# Patient Record
Sex: MALE | Race: ASIAN | URBAN - METROPOLITAN AREA
[De-identification: names, ages, dates, MRNs, and addresses within clinical notes are randomized per-mention and may not be internally consistent; named-entity substitution may affect disease eponyms.]

---

## 2023-11-29 ENCOUNTER — OUTPATIENT (OUTPATIENT)
Dept: OUTPATIENT SERVICES | Facility: HOSPITAL | Age: 63
LOS: 1 days | End: 2023-11-29

## 2023-11-29 DIAGNOSIS — K02.9 DENTAL CARIES, UNSPECIFIED: ICD-10-CM

## 2023-11-29 PROCEDURE — D0170: CPT

## 2024-02-20 ENCOUNTER — OUTPATIENT (OUTPATIENT)
Dept: OUTPATIENT SERVICES | Facility: HOSPITAL | Age: 64
LOS: 1 days | End: 2024-02-20
Payer: COMMERCIAL

## 2024-02-20 DIAGNOSIS — K02.9 DENTAL CARIES, UNSPECIFIED: ICD-10-CM

## 2024-02-20 PROCEDURE — D0330: CPT

## 2024-02-20 PROCEDURE — D0140: CPT

## 2024-02-20 PROCEDURE — D0220: CPT

## 2024-02-21 DIAGNOSIS — K02.9 DENTAL CARIES, UNSPECIFIED: ICD-10-CM

## 2024-03-18 ENCOUNTER — OUTPATIENT (OUTPATIENT)
Dept: OUTPATIENT SERVICES | Facility: HOSPITAL | Age: 64
LOS: 1 days | End: 2024-03-18
Payer: COMMERCIAL

## 2024-03-18 DIAGNOSIS — Z01.20 ENCOUNTER FOR DENTAL EXAMINATION AND CLEANING WITHOUT ABNORMAL FINDINGS: ICD-10-CM

## 2024-03-18 PROCEDURE — D0230: CPT

## 2024-03-18 PROCEDURE — D0120: CPT

## 2024-03-18 PROCEDURE — D1110: CPT

## 2024-03-18 PROCEDURE — D0220: CPT

## 2024-03-21 DIAGNOSIS — Z01.20 ENCOUNTER FOR DENTAL EXAMINATION AND CLEANING WITHOUT ABNORMAL FINDINGS: ICD-10-CM

## 2024-03-27 ENCOUNTER — OUTPATIENT (OUTPATIENT)
Dept: OUTPATIENT SERVICES | Facility: HOSPITAL | Age: 64
LOS: 1 days | End: 2024-03-27
Payer: COMMERCIAL

## 2024-03-27 DIAGNOSIS — K01.1 IMPACTED TEETH: ICD-10-CM

## 2024-03-27 PROCEDURE — D7140: CPT

## 2024-03-27 PROCEDURE — D2930: CPT

## 2024-03-28 DIAGNOSIS — K01.0 EMBEDDED TEETH: ICD-10-CM

## 2024-05-06 ENCOUNTER — APPOINTMENT (OUTPATIENT)
Dept: CARDIOLOGY | Facility: CLINIC | Age: 64
End: 2024-05-06
Payer: MEDICAID

## 2024-05-06 VITALS
WEIGHT: 190 LBS | OXYGEN SATURATION: 98 % | HEART RATE: 104 BPM | HEIGHT: 68 IN | DIASTOLIC BLOOD PRESSURE: 82 MMHG | SYSTOLIC BLOOD PRESSURE: 134 MMHG | BODY MASS INDEX: 28.79 KG/M2

## 2024-05-06 DIAGNOSIS — R94.31 ABNORMAL ELECTROCARDIOGRAM [ECG] [EKG]: ICD-10-CM

## 2024-05-06 PROBLEM — Z00.00 ENCOUNTER FOR PREVENTIVE HEALTH EXAMINATION: Status: ACTIVE | Noted: 2024-05-06

## 2024-05-06 PROCEDURE — 99204 OFFICE O/P NEW MOD 45 MIN: CPT

## 2024-05-06 PROCEDURE — 93000 ELECTROCARDIOGRAM COMPLETE: CPT

## 2024-05-06 NOTE — REVIEW OF SYSTEMS
[Dyspnea on exertion] : dyspnea during exertion [Chest Discomfort] : no chest discomfort [Lower Ext Edema] : no extremity edema [Palpitations] : no palpitations [Syncope] : no syncope [Negative] : Heme/Lymph

## 2024-05-06 NOTE — HISTORY OF PRESENT ILLNESS
[FreeTextEntry1] : 63-year-old male with a PMHx of HTN, HLD, pre-DM  Pt presents for a cardiac evaluation. He was referred by his PCP for an abnormal EKG. He walks slowly 2-3 miles a day with no anginal symptoms but gets some SALAS going uphill or climbing stairs.  He took statin 2 years ago for a short time and then stopped it. PCP restarted it recently.   Trig 393 A1c 6.2 GFR 63

## 2024-05-06 NOTE — ASSESSMENT
[FreeTextEntry1] : 63 YOM HTN HLD uncontrolled  Pre-DM SALAS Abnormal EKG  Plan: Exercise stress ECHO Carotid duplex Repeat blood work in 3 months Follow up after the tests  Hilary Dao DNP, FNP-BC

## 2024-05-15 ENCOUNTER — APPOINTMENT (OUTPATIENT)
Dept: CARDIOLOGY | Facility: CLINIC | Age: 64
End: 2024-05-15
Payer: MEDICAID

## 2024-05-15 ENCOUNTER — NON-APPOINTMENT (OUTPATIENT)
Age: 64
End: 2024-05-15

## 2024-05-15 DIAGNOSIS — R94.39 ABNORMAL RESULT OF OTHER CARDIOVASCULAR FUNCTION STUDY: ICD-10-CM

## 2024-05-15 DIAGNOSIS — R06.02 SHORTNESS OF BREATH: ICD-10-CM

## 2024-05-15 PROCEDURE — 93325 DOPPLER ECHO COLOR FLOW MAPG: CPT

## 2024-05-15 PROCEDURE — 93320 DOPPLER ECHO COMPLETE: CPT

## 2024-05-15 PROCEDURE — 93351 STRESS TTE COMPLETE: CPT

## 2024-05-16 VITALS
OXYGEN SATURATION: 100 % | SYSTOLIC BLOOD PRESSURE: 148 MMHG | HEART RATE: 89 BPM | DIASTOLIC BLOOD PRESSURE: 87 MMHG | RESPIRATION RATE: 16 BRPM | WEIGHT: 179.9 LBS | HEIGHT: 68 IN

## 2024-05-16 LAB
ALBUMIN SERPL ELPH-MCNC: 4.6 G/DL
ALP BLD-CCNC: 57 U/L
ALT SERPL-CCNC: 23 U/L
ANION GAP SERPL CALC-SCNC: 15 MMOL/L
APTT BLD: 32.3 SEC
AST SERPL-CCNC: 26 U/L
BILIRUB SERPL-MCNC: 0.7 MG/DL
BUN SERPL-MCNC: 28 MG/DL
CALCIUM SERPL-MCNC: 9.4 MG/DL
CHLORIDE SERPL-SCNC: 101 MMOL/L
CHOLEST SERPL-MCNC: 136 MG/DL
CO2 SERPL-SCNC: 24 MMOL/L
CREAT SERPL-MCNC: 1.5 MG/DL
EGFR: 52 ML/MIN/1.73M2
GLUCOSE SERPL-MCNC: 119 MG/DL
HCT VFR BLD CALC: 47.9 %
HDLC SERPL-MCNC: 50 MG/DL
HGB BLD-MCNC: 15.8 G/DL
INR PPP: 0.95 RATIO
LDLC SERPL CALC-MCNC: 73 MG/DL
MCHC RBC-ENTMCNC: 28.4 PG
MCHC RBC-ENTMCNC: 33 G/DL
MCV RBC AUTO: 86.2 FL
NONHDLC SERPL-MCNC: 86 MG/DL
PLATELET # BLD AUTO: 235 K/UL
PMV BLD AUTO: 0 /100 WBCS
PMV BLD: 10.5 FL
POTASSIUM SERPL-SCNC: 4.7 MMOL/L
PROT SERPL-MCNC: 7 G/DL
PT BLD: 10.8 SEC
RBC # BLD: 5.56 M/UL
RBC # FLD: 12.9 %
SODIUM SERPL-SCNC: 140 MMOL/L
TRIGL SERPL-MCNC: 67 MG/DL
WBC # FLD AUTO: 8.04 K/UL

## 2024-05-16 NOTE — H&P CARDIOLOGY - COMMENTS
63y Male with PMH of HTN, HLD, ? CKD (Cr 1.5 on 5/16/24), who presented to his cardiologist for a routine check up and found to have an abnormal ECG, with subsequent + stress echo on 5/15/24.  Pt is now referred for [facility] with possible intervention if clinically indicated.

## 2024-05-16 NOTE — H&P CARDIOLOGY - NSICDXPASTMEDICALHX_GEN_ALL_CORE_FT
PAST MEDICAL HISTORY:  DM (diabetes mellitus)     HTN (hypertension)     Hyperlipidemia      PAST MEDICAL HISTORY:  Chronic kidney disease (CKD)     HTN (hypertension)     Hyperlipidemia

## 2024-05-16 NOTE — H&P CARDIOLOGY - HISTORY OF PRESENT ILLNESS
Patient is a 63y Male with PMH of HTN, HLD, DM                                                                       PSH of                                                                       Family history:      Patient has been having symptoms of....  Had + stress echo for ischemia 5/15/24 and presents to the cardiology department for C with possible intervention.         Pre cath note:  indication:  [ ] STEMI                [ ] NSTEMI                 [ ] Acute coronary syndrome                   [ ]Unstable Angina   [ ] high risk  [ ] intermediate risk  [ ] low risk                   [ x] Stable Angina     non-invasive testing: stress echo                         Date:   5/15/24                  result: [ ] high risk  [x ] intermediate risk  [ ] low risk    Anti- Anginal medications:                    [ ] not used                       [ ] used                   [ ] not used but strong indication not to use    Ejection Fraction                   [ ] <29            [ ] 30-39%   [ x] 40-49%     [ ]>50%    CHF                   [ ] active (within last 14 days on meds   [ ] Chronic (on meds but no exacerbation)    COPD                   [ ] mild (on chronic bronchodilators)  [ ] moderate (on chronic steroid therapy)      [ ] severe (indication for home O2 or PACO2 >50)    Other risk factors:                     [ ] Previous MI                     [ ] CVA/ stroke                    [ ] carotid stent/ CEA                    [ ] PVD/PAD- (arterial aneurysm, non-palpable pulses, tortuous vessel with inability to insert catheter, infra-renal dissection, renal or subclavian artery stenosis)                    [x ] diabetic                    [ ] previous CABG                    [ ] Renal Failure     Bleeding Risk: 1.2%        RIGHT RADIAL ARTERY EVALUATION:  AVIS TEST: [] Negative          [] Positive  BARBEAU TEST: [] Class A           [] Class B           [] Class C            [] Class D      EF: 40% per stress echo 5/15/24    EK24 Sinus tachy    Fluids:    63y Male with PMH of HTN, HLD, DM, who has been having symptoms of....  Then, pt had a stress echo on 5/15/24 which was + for ischemia 5/15/24.   Pt is now referred for [facility] with possible intervention if clinically indicated.       Pre cath note:  indication:  [ ] STEMI                [ ] NSTEMI                 [ ] Acute coronary syndrome                   [ ]Unstable Angina   [ ] high risk  [ ] intermediate risk  [ ] low risk                   [ x] Stable Angina     non-invasive testing: stress echo            Date:   5/15/24          result: [ ] high risk  [x ] intermediate risk  [ ] low risk    Anti- Anginal medications:                    [ ] not used                       [ ] used                   [ ] not used but strong indication not to use    Ejection Fraction                   [ ] <29            [ ] 30-39%   [ x] 40-49%     [ ]>50%    CHF                   [ ] active (within last 14 days on meds   [ ] Chronic (on meds but no exacerbation)    COPD                   [ ] mild (on chronic bronchodilators)  [ ] moderate (on chronic steroid therapy)      [ ] severe (indication for home O2 or PACO2 >50)    Other risk factors:                     [ ] Previous MI                     [ ] CVA/ stroke                    [ ] carotid stent/ CEA                    [ ] PVD/PAD- (arterial aneurysm, non-palpable pulses, tortuous vessel with inability to insert catheter, infra-renal dissection, renal or subclavian artery stenosis)                    [x ] diabetic                    [ ] previous CABG                    [ ] Renal Failure     Bleeding Risk: 1.2%        RIGHT RADIAL ARTERY EVALUATION:  AVIS TEST: [] Negative          [] Positive  BARBEAU TEST: [] Class A           [] Class B           [] Class C            [] Class D      EF: 40% per stress echo 5/15/24    EK24 Sinus tachy    Fluids:    63y Male with PMH of HTN, HLD, ? CKD (Cr 1.5 on 5/16/24), who presented to his cardiologist for a routine check up and found to have an abnormal ECG.  Denies any chest pain, dizziness, n/v, diaphoresis, orthopnea, PND, LE edema, palpitations, syncope.  Then, pt had a stress echo on 5/15/24 which was + for apical, inferior, inferoseptal wall ischemia, and EF 40% with exercise, 66% @ rest.  He only exercised for 3 mins and 40 secs, and the test was stopped due to SOB.    Pt is now referred for [facility] with possible intervention if clinically indicated.     Pre cath note:  indication:  [ ] STEMI                [ ] NSTEMI                 [ ] Acute coronary syndrome                   [ ]Unstable Angina   [ ] high risk  [ ] intermediate risk  [ ] low risk                   [ x] Stable Angina     non-invasive testing: stress echo            Date:   5/15/24         result: [ ] high risk  [x ] intermediate risk  [ ] low risk    Anti- Anginal medications:                    [ ] not used                       [x ] used                   [ ] not used but strong indication not to use  -on BB  -Amlodipine 2.5mg po x 1     Ejection Fraction                   [ ] <29            [ ] 30-39%   [ x] 40-49%     [ ]>50%    CHF                   [ ] active (within last 14 days on meds   [ ] Chronic (on meds but no exacerbation)    COPD                   [ ] mild (on chronic bronchodilators)  [ ] moderate (on chronic steroid therapy)      [ ] severe (indication for home O2 or PACO2 >50)    Other risk factors:                     [ ] Previous MI                     [ ] CVA/ stroke                    [ ] carotid stent/ CEA                    [ ] PVD/PAD- (arterial aneurysm, non-palpable pulses, tortuous vessel with inability to insert catheter, infra-renal dissection, renal or subclavian artery stenosis)                    [ ] diabetic                    [ ] previous CABG                    [ ] Renal Failure     Bleeding Risk: 1.2%    RIGHT RADIAL ARTERY EVALUATION:  AVIS TEST: [] Negative          [x] Positive    Fluids: 250cc NS bolus

## 2024-05-17 ENCOUNTER — TRANSCRIPTION ENCOUNTER (OUTPATIENT)
Age: 64
End: 2024-05-17

## 2024-05-17 ENCOUNTER — INPATIENT (INPATIENT)
Facility: HOSPITAL | Age: 64
LOS: 11 days | Discharge: HOME | DRG: 316 | End: 2024-05-29
Attending: THORACIC SURGERY (CARDIOTHORACIC VASCULAR SURGERY) | Admitting: THORACIC SURGERY (CARDIOTHORACIC VASCULAR SURGERY)
Payer: COMMERCIAL

## 2024-05-17 DIAGNOSIS — R94.39 ABNORMAL RESULT OF OTHER CARDIOVASCULAR FUNCTION STUDY: ICD-10-CM

## 2024-05-17 LAB
ANION GAP SERPL CALC-SCNC: 12 MMOL/L — SIGNIFICANT CHANGE UP (ref 7–14)
BUN SERPL-MCNC: 25 MG/DL — HIGH (ref 10–20)
CALCIUM SERPL-MCNC: 9.5 MG/DL — SIGNIFICANT CHANGE UP (ref 8.4–10.5)
CHLORIDE SERPL-SCNC: 104 MMOL/L — SIGNIFICANT CHANGE UP (ref 98–110)
CO2 SERPL-SCNC: 25 MMOL/L — SIGNIFICANT CHANGE UP (ref 17–32)
CREAT SERPL-MCNC: 1.4 MG/DL — SIGNIFICANT CHANGE UP (ref 0.7–1.5)
EGFR: 56 ML/MIN/1.73M2 — LOW
GLUCOSE SERPL-MCNC: 114 MG/DL — HIGH (ref 70–99)
HCT VFR BLD CALC: 47.4 % — SIGNIFICANT CHANGE UP (ref 42–52)
HGB BLD-MCNC: 15.8 G/DL — SIGNIFICANT CHANGE UP (ref 14–18)
MCHC RBC-ENTMCNC: 28.5 PG — SIGNIFICANT CHANGE UP (ref 27–31)
MCHC RBC-ENTMCNC: 33.3 G/DL — SIGNIFICANT CHANGE UP (ref 32–37)
MCV RBC AUTO: 85.6 FL — SIGNIFICANT CHANGE UP (ref 80–94)
NRBC # BLD: 0 /100 WBCS — SIGNIFICANT CHANGE UP (ref 0–0)
PLATELET # BLD AUTO: 206 K/UL — SIGNIFICANT CHANGE UP (ref 130–400)
PMV BLD: 10.3 FL — SIGNIFICANT CHANGE UP (ref 7.4–10.4)
POTASSIUM SERPL-MCNC: 4.1 MMOL/L — SIGNIFICANT CHANGE UP (ref 3.5–5)
POTASSIUM SERPL-SCNC: 4.1 MMOL/L — SIGNIFICANT CHANGE UP (ref 3.5–5)
RBC # BLD: 5.54 M/UL — SIGNIFICANT CHANGE UP (ref 4.7–6.1)
RBC # FLD: 12.7 % — SIGNIFICANT CHANGE UP (ref 11.5–14.5)
SODIUM SERPL-SCNC: 141 MMOL/L — SIGNIFICANT CHANGE UP (ref 135–146)
WBC # BLD: 6.57 K/UL — SIGNIFICANT CHANGE UP (ref 4.8–10.8)
WBC # FLD AUTO: 6.57 K/UL — SIGNIFICANT CHANGE UP (ref 4.8–10.8)

## 2024-05-17 PROCEDURE — 84443 ASSAY THYROID STIM HORMONE: CPT

## 2024-05-17 PROCEDURE — 97116 GAIT TRAINING THERAPY: CPT | Mod: GP

## 2024-05-17 PROCEDURE — 93458 L HRT ARTERY/VENTRICLE ANGIO: CPT

## 2024-05-17 PROCEDURE — 93458 L HRT ARTERY/VENTRICLE ANGIO: CPT | Mod: 26

## 2024-05-17 PROCEDURE — 99221 1ST HOSP IP/OBS SF/LOW 40: CPT

## 2024-05-17 PROCEDURE — 85610 PROTHROMBIN TIME: CPT

## 2024-05-17 PROCEDURE — 93880 EXTRACRANIAL BILAT STUDY: CPT

## 2024-05-17 PROCEDURE — 87641 MR-STAPH DNA AMP PROBE: CPT

## 2024-05-17 PROCEDURE — 94002 VENT MGMT INPAT INIT DAY: CPT

## 2024-05-17 PROCEDURE — 74176 CT ABD & PELVIS W/O CONTRAST: CPT | Mod: MC

## 2024-05-17 PROCEDURE — 84295 ASSAY OF SERUM SODIUM: CPT

## 2024-05-17 PROCEDURE — 86850 RBC ANTIBODY SCREEN: CPT

## 2024-05-17 PROCEDURE — C1889: CPT

## 2024-05-17 PROCEDURE — 84480 ASSAY TRIIODOTHYRONINE (T3): CPT

## 2024-05-17 PROCEDURE — 84436 ASSAY OF TOTAL THYROXINE: CPT

## 2024-05-17 PROCEDURE — 83605 ASSAY OF LACTIC ACID: CPT

## 2024-05-17 PROCEDURE — P9045: CPT

## 2024-05-17 PROCEDURE — 97161 PT EVAL LOW COMPLEX 20 MIN: CPT | Mod: GP

## 2024-05-17 PROCEDURE — 82330 ASSAY OF CALCIUM: CPT

## 2024-05-17 PROCEDURE — 82803 BLOOD GASES ANY COMBINATION: CPT

## 2024-05-17 PROCEDURE — 93005 ELECTROCARDIOGRAM TRACING: CPT

## 2024-05-17 PROCEDURE — 93306 TTE W/DOPPLER COMPLETE: CPT

## 2024-05-17 PROCEDURE — 71046 X-RAY EXAM CHEST 2 VIEWS: CPT

## 2024-05-17 PROCEDURE — 86900 BLOOD TYPING SEROLOGIC ABO: CPT

## 2024-05-17 PROCEDURE — 85730 THROMBOPLASTIN TIME PARTIAL: CPT

## 2024-05-17 PROCEDURE — 83880 ASSAY OF NATRIURETIC PEPTIDE: CPT

## 2024-05-17 PROCEDURE — 97110 THERAPEUTIC EXERCISES: CPT | Mod: GP

## 2024-05-17 PROCEDURE — 85014 HEMATOCRIT: CPT

## 2024-05-17 PROCEDURE — 94010 BREATHING CAPACITY TEST: CPT

## 2024-05-17 PROCEDURE — 36415 COLL VENOUS BLD VENIPUNCTURE: CPT

## 2024-05-17 PROCEDURE — C1751: CPT

## 2024-05-17 PROCEDURE — C1769: CPT

## 2024-05-17 PROCEDURE — 97164 PT RE-EVAL EST PLAN CARE: CPT | Mod: GP

## 2024-05-17 PROCEDURE — 86891 AUTOLOGOUS BLOOD OP SALVAGE: CPT

## 2024-05-17 PROCEDURE — 71045 X-RAY EXAM CHEST 1 VIEW: CPT

## 2024-05-17 PROCEDURE — 86901 BLOOD TYPING SEROLOGIC RH(D): CPT

## 2024-05-17 PROCEDURE — 81003 URINALYSIS AUTO W/O SCOPE: CPT

## 2024-05-17 PROCEDURE — 85025 COMPLETE CBC W/AUTO DIFF WBC: CPT

## 2024-05-17 PROCEDURE — 85027 COMPLETE CBC AUTOMATED: CPT

## 2024-05-17 PROCEDURE — 80053 COMPREHEN METABOLIC PANEL: CPT

## 2024-05-17 PROCEDURE — 84439 ASSAY OF FREE THYROXINE: CPT

## 2024-05-17 PROCEDURE — 80048 BASIC METABOLIC PNL TOTAL CA: CPT

## 2024-05-17 PROCEDURE — 83036 HEMOGLOBIN GLYCOSYLATED A1C: CPT

## 2024-05-17 PROCEDURE — 71250 CT THORAX DX C-: CPT | Mod: MC

## 2024-05-17 PROCEDURE — 86923 COMPATIBILITY TEST ELECTRIC: CPT

## 2024-05-17 PROCEDURE — 82962 GLUCOSE BLOOD TEST: CPT

## 2024-05-17 PROCEDURE — 97530 THERAPEUTIC ACTIVITIES: CPT | Mod: GP

## 2024-05-17 PROCEDURE — 85018 HEMOGLOBIN: CPT

## 2024-05-17 PROCEDURE — 84132 ASSAY OF SERUM POTASSIUM: CPT

## 2024-05-17 PROCEDURE — 87640 STAPH A DNA AMP PROBE: CPT

## 2024-05-17 PROCEDURE — 83735 ASSAY OF MAGNESIUM: CPT

## 2024-05-17 RX ORDER — LOSARTAN POTASSIUM 100 MG/1
1 TABLET, FILM COATED ORAL
Refills: 0 | DISCHARGE

## 2024-05-17 RX ORDER — SODIUM CHLORIDE 9 MG/ML
1000 INJECTION INTRAMUSCULAR; INTRAVENOUS; SUBCUTANEOUS
Refills: 0 | Status: DISCONTINUED | OUTPATIENT
Start: 2024-05-17 | End: 2024-05-17

## 2024-05-17 RX ORDER — METOPROLOL TARTRATE 50 MG
12.5 TABLET ORAL EVERY 12 HOURS
Refills: 0 | Status: DISCONTINUED | OUTPATIENT
Start: 2024-05-17 | End: 2024-05-19

## 2024-05-17 RX ORDER — LANOLIN ALCOHOL/MO/W.PET/CERES
5 CREAM (GRAM) TOPICAL AT BEDTIME
Refills: 0 | Status: DISCONTINUED | OUTPATIENT
Start: 2024-05-17 | End: 2024-05-21

## 2024-05-17 RX ORDER — ATORVASTATIN CALCIUM 80 MG/1
1 TABLET, FILM COATED ORAL
Refills: 0 | DISCHARGE

## 2024-05-17 RX ORDER — HEPARIN SODIUM 5000 [USP'U]/ML
3000 INJECTION INTRAVENOUS; SUBCUTANEOUS EVERY 12 HOURS
Refills: 0 | Status: DISCONTINUED | OUTPATIENT
Start: 2024-05-17 | End: 2024-05-19

## 2024-05-17 RX ORDER — CHLORHEXIDINE GLUCONATE 213 G/1000ML
1 SOLUTION TOPICAL ONCE
Refills: 0 | Status: DISCONTINUED | OUTPATIENT
Start: 2024-05-17 | End: 2024-05-19

## 2024-05-17 RX ORDER — ACETAMINOPHEN 500 MG
650 TABLET ORAL EVERY 6 HOURS
Refills: 0 | Status: DISCONTINUED | OUTPATIENT
Start: 2024-05-17 | End: 2024-05-21

## 2024-05-17 RX ORDER — PANTOPRAZOLE SODIUM 20 MG/1
40 TABLET, DELAYED RELEASE ORAL
Refills: 0 | Status: DISCONTINUED | OUTPATIENT
Start: 2024-05-17 | End: 2024-05-21

## 2024-05-17 RX ORDER — METOPROLOL TARTRATE 50 MG
1 TABLET ORAL
Refills: 0 | DISCHARGE

## 2024-05-17 RX ORDER — ASPIRIN/CALCIUM CARB/MAGNESIUM 324 MG
81 TABLET ORAL DAILY
Refills: 0 | Status: DISCONTINUED | OUTPATIENT
Start: 2024-05-17 | End: 2024-05-21

## 2024-05-17 RX ORDER — SODIUM CHLORIDE 9 MG/ML
3 INJECTION INTRAMUSCULAR; INTRAVENOUS; SUBCUTANEOUS EVERY 8 HOURS
Refills: 0 | Status: DISCONTINUED | OUTPATIENT
Start: 2024-05-17 | End: 2024-05-21

## 2024-05-17 RX ORDER — AMLODIPINE BESYLATE 2.5 MG/1
2.5 TABLET ORAL ONCE
Refills: 0 | Status: COMPLETED | OUTPATIENT
Start: 2024-05-17 | End: 2024-05-17

## 2024-05-17 RX ORDER — ASPIRIN/CALCIUM CARB/MAGNESIUM 324 MG
81 TABLET ORAL ONCE
Refills: 0 | Status: COMPLETED | OUTPATIENT
Start: 2024-05-17 | End: 2024-05-17

## 2024-05-17 RX ORDER — SODIUM CHLORIDE 9 MG/ML
250 INJECTION INTRAMUSCULAR; INTRAVENOUS; SUBCUTANEOUS ONCE
Refills: 0 | Status: COMPLETED | OUTPATIENT
Start: 2024-05-17 | End: 2024-05-17

## 2024-05-17 RX ORDER — ATORVASTATIN CALCIUM 80 MG/1
40 TABLET, FILM COATED ORAL AT BEDTIME
Refills: 0 | Status: DISCONTINUED | OUTPATIENT
Start: 2024-05-17 | End: 2024-05-21

## 2024-05-17 RX ORDER — SODIUM CHLORIDE 9 MG/ML
1000 INJECTION INTRAMUSCULAR; INTRAVENOUS; SUBCUTANEOUS
Refills: 0 | Status: DISCONTINUED | OUTPATIENT
Start: 2024-05-17 | End: 2024-05-19

## 2024-05-17 RX ADMIN — Medication 12.5 MILLIGRAM(S): at 19:02

## 2024-05-17 RX ADMIN — ATORVASTATIN CALCIUM 40 MILLIGRAM(S): 80 TABLET, FILM COATED ORAL at 21:10

## 2024-05-17 RX ADMIN — HEPARIN SODIUM 3000 UNIT(S): 5000 INJECTION INTRAVENOUS; SUBCUTANEOUS at 19:02

## 2024-05-17 RX ADMIN — AMLODIPINE BESYLATE 2.5 MILLIGRAM(S): 2.5 TABLET ORAL at 11:00

## 2024-05-17 RX ADMIN — Medication 81 MILLIGRAM(S): at 11:00

## 2024-05-17 RX ADMIN — SODIUM CHLORIDE 3 MILLILITER(S): 9 INJECTION INTRAMUSCULAR; INTRAVENOUS; SUBCUTANEOUS at 21:36

## 2024-05-17 RX ADMIN — SODIUM CHLORIDE 500 MILLILITER(S): 9 INJECTION INTRAMUSCULAR; INTRAVENOUS; SUBCUTANEOUS at 11:00

## 2024-05-17 RX ADMIN — SODIUM CHLORIDE 100 MILLILITER(S): 9 INJECTION INTRAMUSCULAR; INTRAVENOUS; SUBCUTANEOUS at 11:19

## 2024-05-17 NOTE — CONSULT NOTE ADULT - NS ATTEND AMEND GEN_ALL_CORE FT
The patient is a 63-year-old gentleman we were called to evaluate following his cardiac catheterization that showed left main and severe triple-vessel coronary artery disease.    The patient himself is a very poor historian and an extremely nervous patient and he recorded our entire conversation that I had with him for the first time that I met him.  Also on the phone was the patient's sister who apparently is a physician in [facility].    In summary the patient is an [place] immigrant to the [place] and has been living here for more than 25 years.    Recently has been complaining of increasing symptoms of shortness of breath with dyspnea on exertion which has been progressively getting worse and he had to stop walking because of his shortness of breath which was new for him.  He did agree to some chest discomfort but could not characterize it well.    He clearly has significant unstable angina.    He has a known history of hypertension but he tells me that he stopped taking his medications against the advice of his doctors a few years ago.    There is an extremely strong family history of premature coronary artery disease with premature death and cardiac disease in the family.    He also has a known history of chronic kidney disease but says that he has not been on regular follow-up with his physicians and his baseline creatinine was 1.5.    I had a chance to review his cardiac catheterization which is described above and it does show significant left main and 100% LAD which fills via collaterals.  He also has 8090% multisegmental disease on the right coronary system.    His ejection fraction on the stress echocardiogram few days ago was 40% with a cardiomyopathy.    I offered the patient and his family high risk open heart surgery with coronary artery bypass grafting and they want to think about their options before proceeding.    I had to spend a significant amount of time with the patient and his family and he is in multiple questions and these were all answered.

## 2024-05-17 NOTE — DISCHARGE NOTE PROVIDER - HOSPITAL COURSE
63y Male with PMH of HTN, HLD, ? CKD (Cr 1.5 on 5/16/24),  presented to his primary care MD for routine check up and found to have an abnormal ECG. He was referred to Dr. Carrillo for cardiac work.  Stress ECHO revealed apical, inferior, inferoseptal wall ischemia, and EF 40% with exercise, 66% @ rest.  He only exercised for 3 mins and 40 secs, and the test was stopped due to SOB.  Subsequent cardiac catheterization this admission revealed advanced multi-vessel CAD, He denies any chest pain, dizziness, n/v, diaphoresis, orthopnea, PND, LE edema, palpitations, syncope.  He was admitted post catheterization for medical optimization prior to CABG. On 5/20/24, he underwent    63y Male with PMH of HTN, HLD, ? CKD (Cr 1.5 on 5/16/24),  presented to his primary care MD for routine check up and found to have an abnormal ECG. He was referred to Dr. Carrillo for cardiac work.  Stress ECHO revealed apical, inferior, inferoseptal wall ischemia, and EF 40% with exercise, 66% @ rest.  He only exercised for 3 mins and 40 secs, and the test was stopped due to SOB.  Subsequent cardiac catheterization this admission revealed advanced multi-vessel CAD, He denies any chest pain, dizziness, n/v, diaphoresis, orthopnea, PND, LE edema, palpitations, syncope.  He was admitted post catheterization for medical optimization prior to CABG. On 5/21/24, he underwent surgical myocardial revascularization. Post-operatively,     A discussion was conducted with the patient and family regarding the importance and benefits of participating in a cardiac rehabilitation program. Program information given to patient with instructions to inquire further upon seeing cardiologist   63y Male with PMH of HTN, HLD, ? CKD (Cr 1.5 on 5/16/24),  presented to his primary care MD for routine check up and found to have an abnormal ECG. He was referred to Dr. Carrillo for cardiac work.  Stress ECHO revealed apical, inferior, inferoseptal wall ischemia, and EF 40% with exercise, 66% @ rest.  He only exercised for 3 mins and 40 secs, and the test was stopped due to SOB.  Subsequent cardiac catheterization this admission revealed advanced multi-vessel CAD, He denies any chest pain, dizziness, n/v, diaphoresis, orthopnea, PND, LE edema, palpitations, syncope.  He was admitted post catheterization for medical optimization prior to CABG. On 5/21/24, he underwent surgical myocardial revascularization. Post-operatively, patient had an uneventful hospital course. He was discharged home in stable condition on POD#8 with instructions to follow up with Dr. Kumar on 6/3/2024 @....    A discussion was conducted with the patient and family regarding the importance and benefits of participating in a cardiac rehabilitation program. Program information given to patient with instructions to inquire further upon seeing cardiologist.    63y Male with PMH of HTN, HLD, ? CKD (Cr 1.5 on 5/16/24),  presented to his primary care MD for routine check up and found to have an abnormal ECG. He was referred to Dr. Carrillo for cardiac work.  Stress ECHO revealed apical, inferior, inferoseptal wall ischemia, and EF 40% with exercise, 66% @ rest.  He only exercised for 3 mins and 40 secs, and the test was stopped due to SOB.  Subsequent cardiac catheterization this admission revealed advanced multi-vessel CAD, He denies any chest pain, dizziness, n/v, diaphoresis, orthopnea, PND, LE edema, palpitations, syncope.  He was admitted post catheterization for medical optimization prior to CABG. On 5/21/24, he underwent surgical myocardial revascularization. Post-operatively, patient had an uneventful hospital course. He was discharged home in stable condition on POD#8 with instructions to follow up with Dr. Kumar on 6/3/2024 @ 3:45pm    A discussion was conducted with the patient and family regarding the importance and benefits of participating in a cardiac rehabilitation program. Program information given to patient with instructions to inquire further upon seeing cardiologist.

## 2024-05-17 NOTE — DISCHARGE NOTE PROVIDER - NSDCMRMEDTOKEN_GEN_ALL_CORE_FT
Aspir 81 oral delayed release tablet: 1 tab(s) orally once a day  Lipitor 20 mg oral tablet: 1 tab(s) orally once a day  losartan 50 mg oral tablet: 1 tab(s) orally once a day  Metoprolol Tartrate 25 mg oral tablet: 1 tab(s) orally 2 times a day   acetaminophen 325 mg oral tablet: 2 tab(s) orally every 6 hours As needed Mild Pain (1 - 3), Moderate Pain (4 - 6)  aspirin 81 mg oral delayed release tablet: 1 tab(s) orally once a day  clopidogrel 75 mg oral tablet: 1 tab(s) orally once a day  furosemide 20 mg oral tablet: 1 tab(s) orally once a day  Lipitor 20 mg oral tablet: 1 tab(s) orally once a day  metoprolol tartrate 25 mg oral tablet: 0.5 tab(s) orally every 8 hours  potassium chloride 20 mEq oral tablet, extended release: 1 tab(s) orally once a day

## 2024-05-17 NOTE — DISCHARGE NOTE PROVIDER - NSDCHHNEEDSERVICE_GEN_ALL_CORE
Medication teaching and assessment/Observation and assessment Medication teaching and assessment/Observation and assessment/Teaching and training

## 2024-05-17 NOTE — DISCHARGE NOTE PROVIDER - PROVIDER TOKENS
PROVIDER:[TOKEN:[[id number]]],PROVIDER:[TOKEN:[[id number]]],PROVIDER:[TOKEN:[[id number]]] PROVIDER:[TOKEN:[[id number]],SCHEDULEDAPPT:[06/03/2024],SCHEDULEDAPPTTIME:[03:45 PM]],PROVIDER:[TOKEN:[[id number]]],PROVIDER:[TOKEN:[[id number]]]

## 2024-05-17 NOTE — CHART NOTE - NSCHARTNOTEFT_GEN_A_CORE
PRE-OP DIAGNOSIS: abnormal stress echo     PROCEDURE: [facility] with coronary angiography    Physician: Linus      ANESTHESIA TYPE:  [  ]General Anesthesia  [  ] Sedation  [ x ] Local/Regional    ESTIMATED BLOOD LOSS:    10   mL    CONDITION  [  ] Critical  [  ] Serious  [  ]Fair  [ x ]Good      SPECIMENS REMOVED (IF APPLICABLE): N/A      IV CONTRAST:      60       mL      IMPLANTS (IF APPLICABLE)      FINDINGS    Left Heart Catheterization:    LVEDP: normal   [ ] Normal Coronary Arteries  [ ] Luminal Irregularities  [ ] Non-obstructive CAD      LEFT HEART CATHETERIZATION                                    Left main mild distal disease     LAD: ostial 99/100  , distally supplied by collaterals from Diag and RCA (BRENDAN 0-1 flow)                        Diag: large, ostial 95% , distal minor irregulrities    Left Circumflex: mild to moderate disease   OM: small     Right Coronary Artery: mid 90% lesion , distal 80% lesion   RPDA mild disease   RPL 85 % lesion         DOMINANCE: Right    ACCESS: right radial   CLOSURE: D stat     INTERVENTION  IMPLANTS: none        POST-OP DIAGNOSIS  prox LAD 99/100    ostial large diag 95%  Signficant mid distal RCA lesion, significant RPL lesion         PLAN OF CARE  [ ] D/C Home today  [ ]  D/C in AM  [ ] Return to In-patient bed  [ ] Admit for observation  [ ] Return for staged procedure:  [ x] CT Surgery evaluation       Results of procedure/ plan of care discussed with patient/  in detail.

## 2024-05-17 NOTE — CONSULT NOTE ADULT - SUBJECTIVE AND OBJECTIVE BOX
Surgeon: Dr. Kumar/ Tamika / Jennifer    Consult requesting by: Linus, Primary cardiologist - Gerardo  Primary MD Khalil    HISTORY OF PRESENT ILLNESS:  63y Male with PMH of HTN, HLD, ? CKD (Cr 1.5 on 5/16/24),  presented to his primary care MD for routine check up and found to have an abnormal ECG. He was referred to Dr. Carrillo for cardiac work.  Stress ECHO revealed apical, inferior, inferoseptal wall ischemia, and EF 40% with exercise, 66% @ rest.  He only exercised for 3 mins and 40 secs, and the test was stopped due to SOB.  Subsequent cardiac catheterization today revealed advanced multi-vessel CAD, He denies any chest pain, dizziness, n/v, diaphoresis, orthopnea, PND, LE edema, palpitations, syncope.  CTS called for myocardial revascularization recommendation.    Home Medications:  Aspir 81 oral delayed release tablet: 1 tab(s) orally once a day (17 May 2024 14:10)  Lipitor 20 mg oral tablet: 1 tab(s) orally once a day (17 May 2024 14:10)  losartan 50 mg oral tablet: 1 tab(s) orally once a day (17 May 2024 14:10)  Metoprolol Tartrate 25 mg oral tablet: 1 tab(s) orally 2 times a day (17 May 2024 14:10)    NYHA functional class    [ ] Class I (no limitation) [ ] Class II (slight limitation) [ ] Class III (marked limitation) [ ] Class IV (symptoms at rest)    [place] [employer] grading of angina pectoris  [  ]  Class I	Angina only during strenuous or prolonged physical activity  [ X ]  Class II	Slight limitation, with angina only during vigorous physical activity  [ ]  Class III	Symptoms with everyday living activities, ie, moderate limitation  [ ]  Class IV	Inability to perform any activity without angina or angina at rest, ie, severe limitation      PAST MEDICAL & SURGICAL HISTORY:  HTN (hypertension)  Hyperlipidemia  Chronic kidney disease (CKD)      No significant past surgical history      MEDICATIONS  (STANDING):  aspirin enteric coated 81 milliGRAM(s) Oral daily  atorvastatin 40 milliGRAM(s) Oral at bedtime  chlorhexidine 4% Liquid 1 Application(s) Topical once  heparin   Injectable 3000 Unit(s) SubCutaneous every 12 hours  melatonin 5 milliGRAM(s) Oral at bedtime  metoprolol tartrate 12.5 milliGRAM(s) Oral every 12 hours  pantoprazole    Tablet 40 milliGRAM(s) Oral before breakfast  sodium chloride 0.9% lock flush 3 milliLiter(s) IV Push every 8 hours  sodium chloride 0.9%. 1000 milliLiter(s) (75 mL/Hr) IV Continuous <Continuous>    MEDICATIONS  (PRN):  acetaminophen     Tablet .. 650 milliGRAM(s) Oral every 6 hours PRN Moderate Pain (4 - 6)    Antiplatelet therapy:     none                      Last dose/amt:    Allergies    No Known Allergies    Intolerances    SOCIAL HISTORY:  Smoker:  [ ] No        PACK YEARS:                         WHEN QUIT?  ETOH use: ] No                Ilicit Drug use:   [ ] No  Occupation:   Lives with: sister  Assisted device use: none    FAMILY HISTORY:  No pertinent family history in first degree relatives        Review of Systems  CONSTITUTIONAL: no fever, chills or night sweats]   NEURO:  denies seizures, paralysis or paresthesias                                                                                EYES: wears glasses, no double vision, no blurry vision                                                                          ENMT: no difficulty hearing, vertigo, dysphagia, epistaxis recent dental work [ ]                                      CV:  denies chest pain, SALAS or palpitations at current time                                                                                            RESPIRATORY:  no cough or hemoptysis                                                                 GI:  no nausea, vomiting, constipation or diarrhea   : denies dysuria, hematuria, incontinence or retention                                                                                           MUSKULOSKELETAL:  denies joint swelling or muscle weakness  PSYCH:  denies dementia, depression, anxiety   ENDOCRINE:  cold intolerance[ ] heat intolerance[ ] polydipsia[ ]                                                                                                                                                                                                PHYSICAL EXAM  Vital Signs Last 24 Hrs  T(C): 36.5 (17 May 2024 12:50), Max: 36.7 (17 May 2024 10:34)  T(F): 97.7 (17 May 2024 12:50), Max: 98.1 (17 May 2024 10:34)  HR: 82 (17 May 2024 13:10) (82 - 89)  BP: 134/67 (17 May 2024 13:10) (134/67 - 148/87)  BP(mean): 107 (16 May 2024 19:20) (107 - 107)  RR: 16 (17 May 2024 13:10) (14 - 16)  SpO2: 97% (17 May 2024 13:10) (97% - 100%)    Parameters below as of 17 May 2024 10:34  Patient On (Oxygen Delivery Method): room air      Right arm bp: Left arm bp;    CONSTITUTIONAL:    well nourished, well developed, overweight in NAD                                                                       Neuro: oriented to person/place & time with no focal motor or sensory  deficits     Eyes: PERRLA, EOMI, no nystagmus, sclera anicteric  ENT:  nasal/oral mucosa with absence of cyanosis, poor dentition  Neck: no jugular vein distention, trachea midline, no goiter   Chest: bilateral breath sounds with good air movement absence of wheezes, rales, or rhonchi                                                                           CV:  RSR, S1S2, no significant murmur appreciated  Carotids: No Bruits  GI:  soft, non-tender non-distended, + bowel sounds                                                                                                          Extremities:  no evidence of cyanosis or deformity, no pedal edema   Extremity Pulses: right / left:  femorals 2+/ 2+; DP's 2+/2+; radials pressure dressing/2+    negative Allens  SKIN : no rashes                                                          LABS:                        15.8   6.57  )-----------( 206      ( 17 May 2024 11:50 )             47.4     05-17    141  |  104  |  25<H>  ----------------------------<  114<H>  4.1   |  25  |  1.4    Ca    9.5      17 May 2024 11:50    STS Score:   Procedure Type: Isolated CABG  PERIOPERATIVE OUTCOME	ESTIMATE %  Operative Mortality	0.568%  Morbidity & Mortality	4.78%  Stroke	0.96%  Renal Failure	0.824%  Reoperation	2.87%  Prolonged Ventilation	2.13%  Deep Sternal Wound Infection	0.129%  Long Hospital Stay (>14 days)	2%  Short Hospital Stay (<6 days)*	61.8%  *higher values reflect a better outcome  Clinical Summary  Planned Surgery:	Isolated CABG, Elective, First cardiovascular surgery  Demographics:	63 year old, , male, 81.2kg, 172cm, BMI: 27.4 kg/m²  Lab Values:	Creatinine: 1.5 mg/dL, Hematocrit: 47%, WBC Count: 8 10³/µL, Platelet Count: 445674 cells/µL  PreOp Medications:	ACE Inhibitors/ARBs = 48 hrs  Substance Abuse:	Never smoker  Risk Factors / Comorbidities:	Diabetes Mellitus , Hypertension  Cardiac Status:	Ejection Fraction = 50%  Coronary Artery Disease:	3 vessels diseased, Left Main Stenosis = 50%, Proximal LAD Stenosis = 70%, Stable Angina    Dr. Kumar discussed STS score and results with patient.        Assessment/ Plan:  63y Male with PMH of HTN, HLD, ? CKD (Cr 1.5 on 5/16/24),  presented to his primary care MD for routine check up and found to have an abnormal ECG. He was referred to Dr. Carrillo for cardiac work.  Stress ECHO revealed apical, inferior, inferoseptal wall ischemia, and EF 40% with exercise, 66% @ rest.  He only exercised for 3 mins and 40 secs, and the test was stopped due to SOB.  Subsequent cardiac catheterization  revealed advanced multi-vessel CAD,     Patient needs myocardial revascularization via CABG for CAD  HTN - continue BB  DLD - continue statin  Pre-op for CABG - all testing ordered  Admit CTS for medical optimization prior to CABG

## 2024-05-17 NOTE — DISCHARGE NOTE PROVIDER - NSDCCPTREATMENT_GEN_ALL_CORE_FT
PRINCIPAL PROCEDURE  Procedure: CABG, with CATHLEEN  Findings and Treatment: Activities/Restrictions  1.	Do not – drive, lift, pull or push anything over 10 pounds for 8 weeks.  2.	Shower every night and carefully wash wound, pat dry.  Cover is wound is draining with dry sterile dressing. No baths or swimming for two months.   3.	Apply support stockings /ace wraps to legs as soon as you get out of bed in the morning, remove in evening.   4.	Do progressive walking exercises every day, gradually increasing to 30 to 40 minutes/day, five days a week and incentive spirometer 10 times every hour while awake  5.	DO NOT DRIVE OR CONSUME ALCOHOL WHILE TAKING PAIN MEDICATION.  Contact your Physician promptly if:  1.	 Signs of wound infection, such as increasing redness, swelling, pain or drainage from incision.  2.	Progressive shortness of breath or increasing difficulty with breathing when lying down.  3.	Excessive nausea, vomiting, diarrhea or coughing.  4.	Increase swelling of legs that does not resolve with leg elevation.  5.	Chest pain that spreads to arms, jaw or back or sudden development of numbness, weakness, difficulty speaking or facial droop – Call 911.  6.	Diabetics who are unable to keep finger stick glucose under 150 for three consecutive readings.  Instructions:  1.	 Keep a daily log for Temperature, pulse, blood pressure, and weight twice a day and Glucose if diabetic with each meal. Call office for Temp > 101, pulse greater than 110 or less than 55, BP first # greater than 160 or less than 100, 3 pound weight gain in 1 day or 5 pounds in 3 days  2.	Hold pillow to chest and grab elbows when you need to cough.

## 2024-05-17 NOTE — ASU PATIENT PROFILE, ADULT - FALL HARM RISK - UNIVERSAL INTERVENTIONS
Bed in lowest position, wheels locked, appropriate side rails in place/Call bell, personal items and telephone in reach/Instruct patient to call for assistance before getting out of bed or chair/Non-slip footwear when patient is out of bed/[place] to call system/Physically safe environment - no spills, clutter or unnecessary equipment/Purposeful Proactive Rounding/Room/bathroom lighting operational, light cord in reach

## 2024-05-17 NOTE — DISCHARGE NOTE PROVIDER - CARE PROVIDERS DIRECT ADDRESSES
,[email or web address]

## 2024-05-17 NOTE — DISCHARGE NOTE PROVIDER - NSDCFUSCHEDAPPT_GEN_ALL_CORE_FT
[facility]  CARDIOLOGY [address]  Scheduled Appointment: 05/20/2024     Steven Kumar  [facility]  CTSURG [address] [facility]  Scheduled Appointment: 06/03/2024

## 2024-05-17 NOTE — DISCHARGE NOTE PROVIDER - CARE PROVIDER_API CALL
Steven Kumar  Thoracic and Cardiac Surgery  [address]  [place] [address]  Phone: [phone]  Fax: [phone]  Follow Up Time:     Yordy Carrillo  Cardiovascular Disease  [address]  [place] [address]  Phone: [phone]  Fax: [phone]  Follow Up Time:     Patricia Khalil  Internal Medicine  [address] [place] [address]  Phone: [phone]  Fax: [phone]  Follow Up Time:    Steven Kumar  Thoracic and Cardiac Surgery  [address], [address]  [place] [address]  Phone: [phone]  Fax: [phone]  Scheduled Appointment: 06/03/2024 03:45 PM    Yordy Carrillo  Cardiovascular Disease  [address], [address]  [place] [address]  Phone: [phone]  Fax: [phone]  Follow Up Time:     Patricia Khalil  Internal Medicine  [address] [place] [address]  Phone: [phone]  Fax: [phone]  Follow Up Time:

## 2024-05-17 NOTE — ASU PREOP CHECKLIST - NOTHING BY MOUTH SINCE
Date of Surgery Update:  Ivet Cruz was seen, history and physical examination reviewed, and patient examined by me today. There have been no significant clinical changes since the completion of the previous history and physical.    The risk, benefits, and alternatives of the proposed procedure have been explained to the patient (or appropriate guardian) and understanding verbalized. All questions answered. Patient wishes to proceed.     Electronically signed by: HEATHER Rosenberg,1/26/7099,7:91 AM 17-May-2024 21:00

## 2024-05-18 LAB
A1C WITH ESTIMATED AVERAGE GLUCOSE RESULT: 6 % — HIGH (ref 4–5.6)
ALBUMIN SERPL ELPH-MCNC: 4.2 G/DL — SIGNIFICANT CHANGE UP (ref 3.5–5.2)
ALP SERPL-CCNC: 56 U/L — SIGNIFICANT CHANGE UP (ref 30–115)
ALT FLD-CCNC: 19 U/L — SIGNIFICANT CHANGE UP (ref 0–41)
ANION GAP SERPL CALC-SCNC: 16 MMOL/L — HIGH (ref 7–14)
APTT BLD: 32.8 SEC — SIGNIFICANT CHANGE UP (ref 27–39.2)
AST SERPL-CCNC: 19 U/L — SIGNIFICANT CHANGE UP (ref 0–41)
BASOPHILS # BLD AUTO: 0.02 K/UL — SIGNIFICANT CHANGE UP (ref 0–0.2)
BASOPHILS NFR BLD AUTO: 0.3 % — SIGNIFICANT CHANGE UP (ref 0–1)
BILIRUB SERPL-MCNC: 0.9 MG/DL — SIGNIFICANT CHANGE UP (ref 0.2–1.2)
BUN SERPL-MCNC: 17 MG/DL — SIGNIFICANT CHANGE UP (ref 10–20)
CALCIUM SERPL-MCNC: 8.9 MG/DL — SIGNIFICANT CHANGE UP (ref 8.4–10.4)
CHLORIDE SERPL-SCNC: 105 MMOL/L — SIGNIFICANT CHANGE UP (ref 98–110)
CO2 SERPL-SCNC: 22 MMOL/L — SIGNIFICANT CHANGE UP (ref 17–32)
CREAT SERPL-MCNC: 1.2 MG/DL — SIGNIFICANT CHANGE UP (ref 0.7–1.5)
EGFR: 68 ML/MIN/1.73M2 — SIGNIFICANT CHANGE UP
EOSINOPHIL # BLD AUTO: 0.02 K/UL — SIGNIFICANT CHANGE UP (ref 0–0.7)
EOSINOPHIL NFR BLD AUTO: 0.3 % — SIGNIFICANT CHANGE UP (ref 0–8)
ESTIMATED AVERAGE GLUCOSE: 126 MG/DL — HIGH (ref 68–114)
GLUCOSE SERPL-MCNC: 99 MG/DL — SIGNIFICANT CHANGE UP (ref 70–99)
HCT VFR BLD CALC: 44.4 % — SIGNIFICANT CHANGE UP (ref 42–52)
HGB BLD-MCNC: 15.1 G/DL — SIGNIFICANT CHANGE UP (ref 14–18)
IMM GRANULOCYTES NFR BLD AUTO: 0.3 % — SIGNIFICANT CHANGE UP (ref 0.1–0.3)
INR BLD: 0.98 RATIO — SIGNIFICANT CHANGE UP (ref 0.65–1.3)
LYMPHOCYTES # BLD AUTO: 1.66 K/UL — SIGNIFICANT CHANGE UP (ref 1.2–3.4)
LYMPHOCYTES # BLD AUTO: 23.2 % — SIGNIFICANT CHANGE UP (ref 20.5–51.1)
MCHC RBC-ENTMCNC: 28.7 PG — SIGNIFICANT CHANGE UP (ref 27–31)
MCHC RBC-ENTMCNC: 34 G/DL — SIGNIFICANT CHANGE UP (ref 32–37)
MCV RBC AUTO: 84.3 FL — SIGNIFICANT CHANGE UP (ref 80–94)
MONOCYTES # BLD AUTO: 0.63 K/UL — HIGH (ref 0.1–0.6)
MONOCYTES NFR BLD AUTO: 8.8 % — SIGNIFICANT CHANGE UP (ref 1.7–9.3)
MRSA PCR RESULT.: ABNORMAL
NEUTROPHILS # BLD AUTO: 4.81 K/UL — SIGNIFICANT CHANGE UP (ref 1.4–6.5)
NEUTROPHILS NFR BLD AUTO: 67.1 % — SIGNIFICANT CHANGE UP (ref 42.2–75.2)
NRBC # BLD: 0 /100 WBCS — SIGNIFICANT CHANGE UP (ref 0–0)
NT-PROBNP SERPL-SCNC: 132 PG/ML — SIGNIFICANT CHANGE UP (ref 0–300)
PLATELET # BLD AUTO: 203 K/UL — SIGNIFICANT CHANGE UP (ref 130–400)
PMV BLD: 10.7 FL — HIGH (ref 7.4–10.4)
POTASSIUM SERPL-MCNC: 4 MMOL/L — SIGNIFICANT CHANGE UP (ref 3.5–5)
POTASSIUM SERPL-SCNC: 4 MMOL/L — SIGNIFICANT CHANGE UP (ref 3.5–5)
PROT SERPL-MCNC: 6.3 G/DL — SIGNIFICANT CHANGE UP (ref 6–8)
PROTHROM AB SERPL-ACNC: 11.2 SEC — SIGNIFICANT CHANGE UP (ref 9.95–12.87)
RBC # BLD: 5.27 M/UL — SIGNIFICANT CHANGE UP (ref 4.7–6.1)
RBC # FLD: 12.7 % — SIGNIFICANT CHANGE UP (ref 11.5–14.5)
SODIUM SERPL-SCNC: 143 MMOL/L — SIGNIFICANT CHANGE UP (ref 135–146)
T3 SERPL-MCNC: 114 NG/DL — SIGNIFICANT CHANGE UP (ref 80–200)
T4 AB SER-ACNC: 8.9 UG/DL — SIGNIFICANT CHANGE UP (ref 4.6–12)
T4 FREE SERPL-MCNC: 1.7 NG/DL — SIGNIFICANT CHANGE UP (ref 0.9–1.8)
TSH SERPL-MCNC: 0.82 UIU/ML — SIGNIFICANT CHANGE UP (ref 0.27–4.2)
TSH SERPL-MCNC: 0.98 UIU/ML — SIGNIFICANT CHANGE UP (ref 0.27–4.2)
WBC # BLD: 7.16 K/UL — SIGNIFICANT CHANGE UP (ref 4.8–10.8)
WBC # FLD AUTO: 7.16 K/UL — SIGNIFICANT CHANGE UP (ref 4.8–10.8)

## 2024-05-18 PROCEDURE — 74176 CT ABD & PELVIS W/O CONTRAST: CPT | Mod: 26

## 2024-05-18 PROCEDURE — 71045 X-RAY EXAM CHEST 1 VIEW: CPT | Mod: 26

## 2024-05-18 PROCEDURE — 93880 EXTRACRANIAL BILAT STUDY: CPT | Mod: 26

## 2024-05-18 PROCEDURE — 71250 CT THORAX DX C-: CPT | Mod: 26

## 2024-05-18 RX ADMIN — ATORVASTATIN CALCIUM 40 MILLIGRAM(S): 80 TABLET, FILM COATED ORAL at 21:12

## 2024-05-18 RX ADMIN — SODIUM CHLORIDE 3 MILLILITER(S): 9 INJECTION INTRAMUSCULAR; INTRAVENOUS; SUBCUTANEOUS at 23:00

## 2024-05-18 RX ADMIN — PANTOPRAZOLE SODIUM 40 MILLIGRAM(S): 20 TABLET, DELAYED RELEASE ORAL at 05:14

## 2024-05-18 RX ADMIN — Medication 12.5 MILLIGRAM(S): at 17:54

## 2024-05-18 RX ADMIN — SODIUM CHLORIDE 3 MILLILITER(S): 9 INJECTION INTRAMUSCULAR; INTRAVENOUS; SUBCUTANEOUS at 14:52

## 2024-05-18 RX ADMIN — HEPARIN SODIUM 3000 UNIT(S): 5000 INJECTION INTRAVENOUS; SUBCUTANEOUS at 05:13

## 2024-05-18 RX ADMIN — HEPARIN SODIUM 3000 UNIT(S): 5000 INJECTION INTRAVENOUS; SUBCUTANEOUS at 17:54

## 2024-05-18 RX ADMIN — SODIUM CHLORIDE 3 MILLILITER(S): 9 INJECTION INTRAMUSCULAR; INTRAVENOUS; SUBCUTANEOUS at 05:45

## 2024-05-18 RX ADMIN — Medication 12.5 MILLIGRAM(S): at 05:13

## 2024-05-18 RX ADMIN — Medication 5 MILLIGRAM(S): at 21:11

## 2024-05-18 RX ADMIN — Medication 81 MILLIGRAM(S): at 11:59

## 2024-05-18 NOTE — PROGRESS NOTE ADULT - SUBJECTIVE AND OBJECTIVE BOX
PLANNED OPERATIVE PROCEDURE(s): CABG               HD # 1                      SURGEON(s): MIGUEL Kumar    HPI:    63y Male with PMH of HTN, HLD, ? CKD (Cr 1.5 on 5/16/24),  presented to his primary care MD for routine check up and found to have an abnormal ECG. He was referred to Dr. Carrillo for cardiac work.  Stress ECHO revealed apical, inferior, inferoseptal wall ischemia, and EF 40% with exercise, 66% @ rest.  He only exercised for 3 mins and 40 secs, and the test was stopped due to SOB.  Subsequent cardiac catheterization today revealed advanced multi-vessel CAD, He denies any chest pain, dizziness, n/v, diaphoresis, orthopnea, PND, LE edema, palpitations, syncope.  CTS called for myocardial revascularization recommendation.      SUBJECTIVE ASSESSMENT:63y Male patient seen and examined at bedside.    Vital Signs Last 24 Hrs  T(F): 98.8 (18 May 2024 05:00), Max: 98.8 (18 May 2024 05:00)  HR: 74 (18 May 2024 12:16) (74 - 93)  BP: 135/82 (18 May 2024 12:16) (120/73 - 150/88)  RR: 18 (18 May 2024 05:00) (18 - 18)    RUE SBP: 120/73               LUE SBP: 135/82      I&O's Detail    17 May 2024 07:01  -  18 May 2024 07:00  --------------------------------------------------------  IN:    Oral Fluid: 500 mL    sodium chloride 0.9%: 100 mL    Sodium Chloride 0.9% Bolus: 250 mL  Total IN: 850 mL    OUT:  Total OUT: 0 mL        Net: I&O's Detail    17 May 2024 07:01  -  18 May 2024 07:00  --------------------------------------------------------  Total NET: 850 mL        CAPILLARY BLOOD GLUCOSE          Allergies    No Known Allergies    Intolerances        MEDICATIONS  (STANDING):  aspirin enteric coated 81 milliGRAM(s) Oral daily  atorvastatin 40 milliGRAM(s) Oral at bedtime  chlorhexidine 4% Liquid 1 Application(s) Topical once  heparin   Injectable 3000 Unit(s) SubCutaneous every 12 hours  melatonin 5 milliGRAM(s) Oral at bedtime  metoprolol tartrate 12.5 milliGRAM(s) Oral every 12 hours  pantoprazole    Tablet 40 milliGRAM(s) Oral before breakfast  sodium chloride 0.9% lock flush 3 milliLiter(s) IV Push every 8 hours  sodium chloride 0.9%. 1000 milliLiter(s) (75 mL/Hr) IV Continuous <Continuous>    MEDICATIONS  (PRN):  acetaminophen     Tablet .. 650 milliGRAM(s) Oral every 6 hours PRN Moderate Pain (4 - 6)    Home Medications:  Aspir 81 oral delayed release tablet: 1 tab(s) orally once a day (17 May 2024 14:10)  Lipitor 20 mg oral tablet: 1 tab(s) orally once a day (17 May 2024 14:10)  losartan 50 mg oral tablet: 1 tab(s) orally once a day (17 May 2024 14:10)  Metoprolol Tartrate 25 mg oral tablet: 1 tab(s) orally 2 times a day (17 May 2024 14:10)      Physical Exam:  General: NAD; A&Ox3  Cardiac: S1/S2, RRR, no murmur, no rubs  Lungs: unlabored respirations, CTA b/l, no wheeze, no rales, no crackles  Abdomen: Soft/NT/ND; positive bowel sounds x 4  Extremities: No edema b/l lower extremities; good capillary refill; no cyanosis; palpable 1+ pedal pulses b/l    BOWEL MOVEMENT:  [] YES [X] NO, If No, Timing since last BM Day #        LABS:                        15.1   7.16  )-----------( 203      ( 18 May 2024 08:08 )             44.4                         15.8   6.57  )-----------( 206      ( 17 May 2024 11:50 )             47.4     05-18    143  |  105  |  17  ----------------------------<  99  4.0   |  22  |  1.2  05-17    141  |  104  |  25<H>  ----------------------------<  114<H>  4.1   |  25  |  1.4    Ca    8.9      18 May 2024 08:08    TPro  6.3 [6.0 - 8.0]  /  Alb  4.2 [3.5 - 5.2]  /  TBili  0.9 [0.2 - 1.2]  /  DBili  x   /  AST  19 [0 - 41]  /  ALT  19 [0 - 41]  /  AlkPhos  56 [30 - 115]  05-18    PT/INR - ( 18 May 2024 08:08 )   PT: ;   INR: 0.98 ratio         PTT - ( 18 May 2024 08:08 )  PTT:32.8 sec  Urinalysis Basic - ( 18 May 2024 08:08 )    Color: x / Appearance: x / SG: x / pH: x  Gluc: 99 mg/dL / Ketone: x  / Bili: x / Urobili: x   Blood: x / Protein: x / Nitrite: x   Leuk Esterase: x / RBC: x / WBC x   Sq Epi: x / Non Sq Epi: x / Bacteria: x        A1C with Estimated Average Glucose Result: 6.0 % (05-18-24 @ 08:08)      RADIOLOGY & ADDITIONAL TESTS:  CXR: completed, awaiting results    EKG: completed, in chart      Carotid Duplex: completed, awaiting results    PFT's: unable to assess, no PFT testing on weekends.     Echocardiogram: ordered, results pending      Cardiac catheterization:    LEFT HEART CATHETERIZATION                                    Left main mild distal disease     LAD: ostial 99/100  , distally supplied by collaterals from Diag and RCA (BRENDAN 0-1 flow)                        Diag: large, ostial 95% , distal minor irregulrities    Left Circumflex: mild to moderate disease   OM: small     Right Coronary Artery: mid 90% lesion , distal 80% lesion   RPDA mild disease   RPL 85 % lesion     DOMINANCE: Right    ACCESS: right radial   CLOSURE: D stat     INTERVENTION  IMPLANTS: none    POST-OP DIAGNOSIS  prox LAD 99/100    ostial large diag 95%  Signficant mid distal RCA lesion, significant RPL lesion     CT CHEST: completed, awaiting results      Pharmacologic DVT Prophylaxis: [X] YES, []NO: Contraindication:   [X] HEPARIN: Dose: 5000u  Q12H    [] LOVENOX: Dose: XX mg  Q24H                 SCD's: YESb/l    GI Prophylaxis: Protonix [X], Pepcid []    Ambulation/Activity Status: ambulate as tolerated   5meter walk test: T1: 5s/T2: 5s/T3: 5s          Assessment/Plan:  63y Male Pre-op for CABG  - Case and plan discussed with CTU Intensivist and CT Surgeon - Dr. Rodriguez/Taimka/José/Raghav. STS risk score assessed and discussed risk with patient. Attending note to follow.  - Continue supportive care.    - Continue DVT/GI prophylaxis  - Incentive Spirometry 10 times an hour  - Continue to advance physical activity as tolerated and continue PT/OT as directed  1. CAD: Continue ASA, statin, BB. Pre-op work-up in progress. Anticipate surgery this Monday  - Transfer to CTU once bed is available

## 2024-05-18 NOTE — PATIENT PROFILE ADULT - FALL HARM RISK - RISK INTERVENTIONS

## 2024-05-19 ENCOUNTER — RESULT REVIEW (OUTPATIENT)
Age: 64
End: 2024-05-19

## 2024-05-19 LAB
ABO RH CONFIRMATION: SIGNIFICANT CHANGE UP
ALBUMIN SERPL ELPH-MCNC: 4.1 G/DL — SIGNIFICANT CHANGE UP (ref 3.5–5.2)
ALP SERPL-CCNC: 54 U/L — SIGNIFICANT CHANGE UP (ref 30–115)
ALT FLD-CCNC: 18 U/L — SIGNIFICANT CHANGE UP (ref 0–41)
ANION GAP SERPL CALC-SCNC: 13 MMOL/L — SIGNIFICANT CHANGE UP (ref 7–14)
APPEARANCE UR: CLEAR — SIGNIFICANT CHANGE UP
AST SERPL-CCNC: 18 U/L — SIGNIFICANT CHANGE UP (ref 0–41)
BILIRUB SERPL-MCNC: 0.7 MG/DL — SIGNIFICANT CHANGE UP (ref 0.2–1.2)
BILIRUB UR-MCNC: NEGATIVE — SIGNIFICANT CHANGE UP
BLD GP AB SCN SERPL QL: SIGNIFICANT CHANGE UP
BUN SERPL-MCNC: 18 MG/DL — SIGNIFICANT CHANGE UP (ref 10–20)
CALCIUM SERPL-MCNC: 9.2 MG/DL — SIGNIFICANT CHANGE UP (ref 8.4–10.4)
CHLORIDE SERPL-SCNC: 105 MMOL/L — SIGNIFICANT CHANGE UP (ref 98–110)
CO2 SERPL-SCNC: 23 MMOL/L — SIGNIFICANT CHANGE UP (ref 17–32)
COLOR SPEC: YELLOW — SIGNIFICANT CHANGE UP
CREAT SERPL-MCNC: 1.3 MG/DL — SIGNIFICANT CHANGE UP (ref 0.7–1.5)
DIFF PNL FLD: NEGATIVE — SIGNIFICANT CHANGE UP
EGFR: 62 ML/MIN/1.73M2 — SIGNIFICANT CHANGE UP
GLUCOSE SERPL-MCNC: 93 MG/DL — SIGNIFICANT CHANGE UP (ref 70–99)
GLUCOSE UR QL: NEGATIVE MG/DL — SIGNIFICANT CHANGE UP
HCT VFR BLD CALC: 44.5 % — SIGNIFICANT CHANGE UP (ref 42–52)
HGB BLD-MCNC: 14.9 G/DL — SIGNIFICANT CHANGE UP (ref 14–18)
KETONES UR-MCNC: NEGATIVE MG/DL — SIGNIFICANT CHANGE UP
LEUKOCYTE ESTERASE UR-ACNC: NEGATIVE — SIGNIFICANT CHANGE UP
MAGNESIUM SERPL-MCNC: 2.2 MG/DL — SIGNIFICANT CHANGE UP (ref 1.8–2.4)
MCHC RBC-ENTMCNC: 28.5 PG — SIGNIFICANT CHANGE UP (ref 27–31)
MCHC RBC-ENTMCNC: 33.5 G/DL — SIGNIFICANT CHANGE UP (ref 32–37)
MCV RBC AUTO: 85.2 FL — SIGNIFICANT CHANGE UP (ref 80–94)
MRSA PCR RESULT.: NEGATIVE — SIGNIFICANT CHANGE UP
NITRITE UR-MCNC: NEGATIVE — SIGNIFICANT CHANGE UP
NRBC # BLD: 0 /100 WBCS — SIGNIFICANT CHANGE UP (ref 0–0)
PH UR: 6 — SIGNIFICANT CHANGE UP (ref 5–8)
PLATELET # BLD AUTO: 179 K/UL — SIGNIFICANT CHANGE UP (ref 130–400)
PMV BLD: 10.5 FL — HIGH (ref 7.4–10.4)
POTASSIUM SERPL-MCNC: 3.8 MMOL/L — SIGNIFICANT CHANGE UP (ref 3.5–5)
POTASSIUM SERPL-SCNC: 3.8 MMOL/L — SIGNIFICANT CHANGE UP (ref 3.5–5)
PROT SERPL-MCNC: 6.2 G/DL — SIGNIFICANT CHANGE UP (ref 6–8)
PROT UR-MCNC: NEGATIVE MG/DL — SIGNIFICANT CHANGE UP
RBC # BLD: 5.22 M/UL — SIGNIFICANT CHANGE UP (ref 4.7–6.1)
RBC # FLD: 12.9 % — SIGNIFICANT CHANGE UP (ref 11.5–14.5)
SODIUM SERPL-SCNC: 141 MMOL/L — SIGNIFICANT CHANGE UP (ref 135–146)
SP GR SPEC: 1.01 — SIGNIFICANT CHANGE UP (ref 1–1.03)
UROBILINOGEN FLD QL: 0.2 MG/DL — SIGNIFICANT CHANGE UP (ref 0.2–1)
WBC # BLD: 7.73 K/UL — SIGNIFICANT CHANGE UP (ref 4.8–10.8)
WBC # FLD AUTO: 7.73 K/UL — SIGNIFICANT CHANGE UP (ref 4.8–10.8)

## 2024-05-19 PROCEDURE — 93306 TTE W/DOPPLER COMPLETE: CPT | Mod: 26

## 2024-05-19 PROCEDURE — 99232 SBSQ HOSP IP/OBS MODERATE 35: CPT | Mod: 57

## 2024-05-19 PROCEDURE — 93010 ELECTROCARDIOGRAM REPORT: CPT

## 2024-05-19 RX ORDER — ALPRAZOLAM 0.25 MG
0.25 TABLET ORAL AT BEDTIME
Refills: 0 | Status: DISCONTINUED | OUTPATIENT
Start: 2024-05-19 | End: 2024-05-21

## 2024-05-19 RX ORDER — POTASSIUM CHLORIDE 20 MEQ
40 PACKET (EA) ORAL ONCE
Refills: 0 | Status: COMPLETED | OUTPATIENT
Start: 2024-05-19 | End: 2024-05-19

## 2024-05-19 RX ORDER — CHLORHEXIDINE GLUCONATE 213 G/1000ML
5 SOLUTION TOPICAL ONCE
Refills: 0 | Status: COMPLETED | OUTPATIENT
Start: 2024-05-19 | End: 2024-05-20

## 2024-05-19 RX ORDER — METOPROLOL TARTRATE 50 MG
12.5 TABLET ORAL ONCE
Refills: 0 | Status: COMPLETED | OUTPATIENT
Start: 2024-05-19 | End: 2024-05-19

## 2024-05-19 RX ORDER — MUPIROCIN 20 MG/G
1 OINTMENT TOPICAL EVERY 12 HOURS
Refills: 0 | Status: DISCONTINUED | OUTPATIENT
Start: 2024-05-19 | End: 2024-05-21

## 2024-05-19 RX ORDER — CHLORHEXIDINE GLUCONATE 213 G/1000ML
1 SOLUTION TOPICAL ONCE
Refills: 0 | Status: COMPLETED | OUTPATIENT
Start: 2024-05-19 | End: 2024-05-19

## 2024-05-19 RX ADMIN — SODIUM CHLORIDE 3 MILLILITER(S): 9 INJECTION INTRAMUSCULAR; INTRAVENOUS; SUBCUTANEOUS at 04:48

## 2024-05-19 RX ADMIN — SODIUM CHLORIDE 3 MILLILITER(S): 9 INJECTION INTRAMUSCULAR; INTRAVENOUS; SUBCUTANEOUS at 15:23

## 2024-05-19 RX ADMIN — HEPARIN SODIUM 3000 UNIT(S): 5000 INJECTION INTRAVENOUS; SUBCUTANEOUS at 05:07

## 2024-05-19 RX ADMIN — MUPIROCIN 1 APPLICATION(S): 20 OINTMENT TOPICAL at 17:38

## 2024-05-19 RX ADMIN — Medication 81 MILLIGRAM(S): at 11:21

## 2024-05-19 RX ADMIN — Medication 40 MILLIEQUIVALENT(S): at 15:32

## 2024-05-19 RX ADMIN — ATORVASTATIN CALCIUM 40 MILLIGRAM(S): 80 TABLET, FILM COATED ORAL at 23:13

## 2024-05-19 RX ADMIN — Medication 5 MILLIGRAM(S): at 23:14

## 2024-05-19 RX ADMIN — SODIUM CHLORIDE 3 MILLILITER(S): 9 INJECTION INTRAMUSCULAR; INTRAVENOUS; SUBCUTANEOUS at 22:00

## 2024-05-19 RX ADMIN — PANTOPRAZOLE SODIUM 40 MILLIGRAM(S): 20 TABLET, DELAYED RELEASE ORAL at 05:08

## 2024-05-19 RX ADMIN — Medication 12.5 MILLIGRAM(S): at 23:14

## 2024-05-19 NOTE — PROGRESS NOTE ADULT - NS ATTEND AMEND GEN_ALL_CORE FT
Patient seen and examined along with the physician assistant and the team on rounds    Also present at the patient's bedside was the patient's sister and healthcare proxy    The patient was admitted with unstable angina  Critical triple-vessel coronary artery disease    He is in the process of being worked up for coronary artery bypass grafting surgery.    All his preoperative testing was reviewed    I once again discussed the operation with the patient and his family and again offered them open heart surgery with coronary artery bypass grafting.  I discussed the risks, benefits, and alternatives to the surgical procedure in detail.  The risks that we discussed included but were not limited to bleeding, infection, stroke, myocardial infarction, renal failure, multi-organ failure, death, etc. amongst others were discussed in detail.  All questions. were answered.  The patient and the family want to proceed with the high risk intervention.

## 2024-05-19 NOTE — PROGRESS NOTE ADULT - SUBJECTIVE AND OBJECTIVE BOX
Cardiac Surgery Pre-op Note:  CC: Patient is a 63y old  Male who presents with a chief complaint of Coronary artery Disease (17 May 2024 14:27)      Referring Physician: Linus, Primary cardiologist - Gerardo  Primary MD Khalil                                                                                     Surgeon: thaddeus  Procedure: 5/20- cabg    Allergies    No Known Allergies    Intolerances      HPI:    63y Male with PMH of HTN, HLD, ? CKD (Cr 1.5 on 5/16/24), who presented to his cardiologist for a routine check up and found to have an abnormal ECG.  Denies any chest pain, dizziness, n/v, diaphoresis, orthopnea, PND, LE edema, palpitations, syncope.  Then, pt had a stress echo on 5/15/24 which was + for apical, inferior, inferoseptal wall ischemia, and EF 40% with exercise, 66% @ rest.  He only exercised for 3 mins and 40 secs, and the test was stopped due to SOB.  Subsequent cardia cath demonstrated multi-vessel CAD.  He is now in CTU after being referred for CABG and is currently without complaints.      PAST MEDICAL & SURGICAL HISTORY:  HTN (hypertension)    Hyperlipidemia    Chronic kidney disease (CKD)      No significant past surgical history    MEDICATIONS  (STANDING):  aspirin enteric coated 81 milliGRAM(s) Oral daily  atorvastatin 40 milliGRAM(s) Oral at bedtime  chlorhexidine 4% Liquid 1 Application(s) Topical once  heparin   Injectable 3000 Unit(s) SubCutaneous every 12 hours  melatonin 5 milliGRAM(s) Oral at bedtime  metoprolol tartrate 12.5 milliGRAM(s) Oral every 12 hours  pantoprazole    Tablet 40 milliGRAM(s) Oral before breakfast  sodium chloride 0.9% lock flush 3 milliLiter(s) IV Push every 8 hours  sodium chloride 0.9%. 1000 milliLiter(s) (75 mL/Hr) IV Continuous <Continuous>    MEDICATIONS  (PRN):  acetaminophen     Tablet .. 650 milliGRAM(s) Oral every 6 hours PRN Moderate Pain (4 - 6)      Labs:                        15.1   7.16  )-----------( 203      ( 18 May 2024 08:08 )             44.4     05-18    143  |  105  |  17  ----------------------------<  99  4.0   |  22  |  1.2    Ca    8.9      18 May 2024 08:08    TPro  6.3  /  Alb  4.2  /  TBili  0.9  /  DBili  x   /  AST  19  /  ALT  19  /  AlkPhos  56  05-18    PT/INR - ( 18 May 2024 08:08 )   PT: 11.20 sec;   INR: 0.98 ratio         PTT - ( 18 May 2024 08:08 )  PTT:32.8 sec    Blood Type:   HGB A1C: 6.0  Prealbumin:   Pro-BNP: 132  Thyroid Panel: 05-18 @ 13:18/0.82  1.7/8.9/114  05-18 @ 08:08/0.98  --/--/--    MRSA: MRSA PCR Result.: Indeterminate By: Real-Time PCR (Polymerase Reaction Method) (05-18 @ 17:28)   / MSSA:   Urinalysis Basic - ( 18 May 2024 08:08 )    Color: x / Appearance: x / SG: x / pH: x  Gluc: 99 mg/dL / Ketone: x  / Bili: x / Urobili: x   Blood: x / Protein: x / Nitrite: x   Leuk Esterase: x / RBC: x / WBC x   Sq Epi: x / Non Sq Epi: x / Bacteria: x    ACC: [id number] EXAM:  CT ABDOMEN AND PELVIS   ORDERED BY: HENRY LEBLANC     ACC: [id number] EXAM:  CT CHEST   ORDERED BY: HENRY LEBLANC     PROCEDURE DATE:  05/18/2024          INTERPRETATION:  Reason for Exam:  Preoperative CT, evaluate aorta for   calcium in size.  CT of the chest was performed from the thoracic inlet to the level of the   adrenal glands without contrast injection. Coronal and sagittal images   have been submitted.    Comparison: None available    Findings:    Tubes/Lines: None    Lungs, Pleura, and Airways:There is no evidence of endobronchial   obstruction, bronchiectasis or honeycombing.    Mediastinum/Lymph Nodes:Enlarged left thyroid lobe measuring 6.2 cm   resulting in a rightward tracheal deviation approximately 1.5cm. There   is no mediastinal, axillary, or supraclavicular lymphadenopathy.    Heart/Great Vessels: The heart is normal in size. There are coronary   artery calcifications involving the left anterior descending, right   coronary artery and circumflex coronary arteries. No pericardial   effusion. The aorta is normal in size and measures 3.3 cm. The pulmonary   artery is normal in size and measures 3.2 cm.    ABDOMEN/PELVIS:    HEPATOBILIARY: Unremarkable.    SPLEEN: Unremarkable.    PANCREAS: Unremarkable.    ADRENAL GLANDS: Unremarkable.    KIDNEYS: Symmetric pattern of renal enhancement. No hydronephrosis   bilaterally.    ABDOMINOPELVIC NODES: No lymphadenopathy.    PELVIC ORGANS: Unremarkable.    PERITONEUM/MESENTERY/BOWEL: No bowel obstruction. No ascites or   pneumoperitoneum. Scattered colonic diverticuli without evidence of   diverticulitis. The appendix is normal.    BONES/SOFT TISSUES: Unremarkable.    OTHER: The aorta is normal in size with scattered calcifications..    IMPRESSION:  No acute intrathoracic or intra-abdominal pathology.  Enlarged left thyroid resulting in a mild rightward tracheal deviation.    --- End of Report ---    CXR:     EKG:    Carotid Duplex:      PFT's:    Echocardiogram:    Cardiac catheterization: LEFT HEART CATHETERIZATION                                    Left main mild distal disease     LAD: ostial 99/100  , distally supplied by collaterals from Diag and RCA (BRENDAN 0-1 flow)                        Diag: large, ostial 95% , distal minor irregulrities    Left Circumflex: mild to moderate disease   OM: small     Right Coronary Artery: mid 90% lesion , distal 80% lesion   RPDA mild disease   RPL 85 % lesion     Gen: WN/WD NAD  Neuro: A&Ox3, nonfocal  Pulm: CTA B/L  CV: RRR, S1S2 +systolic murmur  Abd: Soft, NT, ND +BS  Ext: No edema, + peripheral pulses    Cardic Surgery Risk Factors  CVA and/or carotid/cerebrovascular disease. Yes  No  Explain if Yes  Aortoiliac disease Yes  No  Explain if Yes  Previous MI Yes  No  Explain if Yes  Previos Cardiac Surgery Yes  No  Explain if Yes  Hemodynamics-Unstable or Shock Yes  No  Explain if Yes  Diabetis Yes  No  Explain if Yes  Hepatic Failure Yes  No  Explain if Yes  Renal failure and/or dialysis Yes  No  Explain if Yes  Heart failure-type-present or past Yes  No  Explain if Yes  COPD Yes  No  Explain if Yes  Immune System Deficiency Yes  No  Explain if Yes  Malignant Ventricular Arrhythmia Yes  No  Explain if Yes    STS Score:     Pt has AICD/PPM [ ] Yes  [x ] No             Brand Name:  Pre-op Beta Blocker ordered within 24 hrs of surgery (CABG ONLY)?  [x ] Yes  [ ] No  If not, Why?  Type & Cross  [ ] Yes  [ ] No  NPO after Midnight [x ] Yes  [ ] No  Pre-op ABX ordered, to be taped on chart:  [ x] Yes  [ ] No     Hibiclens/Peridex ordered [x ] Yes  [ ] No  Intraop on Hold: PRBCs, CXR, CATHLEEN [x ]   Consent obtained  [x ] Yes  [ ] No                 Cardiac Surgery Pre-op Note:  CC: Patient is a 63y old  Male who presents with a chief complaint of Coronary artery Disease (17 May 2024 14:27)      Referring Physician: Linus, Primary cardiologist - Gerardo  Primary MD Khalil                                                                                     Surgeon: thaddeus  Procedure: 5/20- cabg    Allergies    No Known Allergies    Intolerances      HPI:    63y Male with PMH of HTN, HLD, ? CKD (Cr 1.5 on 5/16/24), who presented to his cardiologist for a routine check up and found to have an abnormal ECG.  Denies any chest pain, dizziness, n/v, diaphoresis, orthopnea, PND, LE edema, palpitations, syncope.  Then, pt had a stress echo on 5/15/24 which was + for apical, inferior, inferoseptal wall ischemia, and EF 40% with exercise, 66% @ rest.  He only exercised for 3 mins and 40 secs, and the test was stopped due to SOB.  Subsequent cardia cath demonstrated multi-vessel CAD.  He is now in CTU after being referred for CABG and is currently without complaints other than feeling anxious about surgery planned for tomorrow am.      PAST MEDICAL & SURGICAL HISTORY:  HTN (hypertension)    Hyperlipidemia    Chronic kidney disease (CKD)      No significant past surgical history    MEDICATIONS  (STANDING):  aspirin enteric coated 81 milliGRAM(s) Oral daily  atorvastatin 40 milliGRAM(s) Oral at bedtime  chlorhexidine 4% Liquid 1 Application(s) Topical once  heparin   Injectable 3000 Unit(s) SubCutaneous every 12 hours  melatonin 5 milliGRAM(s) Oral at bedtime  metoprolol tartrate 12.5 milliGRAM(s) Oral every 12 hours  pantoprazole    Tablet 40 milliGRAM(s) Oral before breakfast  sodium chloride 0.9% lock flush 3 milliLiter(s) IV Push every 8 hours  sodium chloride 0.9%. 1000 milliLiter(s) (75 mL/Hr) IV Continuous <Continuous>    MEDICATIONS  (PRN):  acetaminophen     Tablet .. 650 milliGRAM(s) Oral every 6 hours PRN Moderate Pain (4 - 6)      Labs:                        15.1   7.16  )-----------( 203      ( 18 May 2024 08:08 )             44.4     05-18    143  |  105  |  17  ----------------------------<  99  4.0   |  22  |  1.2    Ca    8.9      18 May 2024 08:08    TPro  6.3  /  Alb  4.2  /  TBili  0.9  /  DBili  x   /  AST  19  /  ALT  19  /  AlkPhos  56  05-18    PT/INR - ( 18 May 2024 08:08 )   PT: 11.20 sec;   INR: 0.98 ratio         PTT - ( 18 May 2024 08:08 )  PTT:32.8 sec    Blood Type:   HGB A1C: 6.0  Prealbumin:   Pro-BNP: 132  Thyroid Panel: 05-18 @ 13:18/0.82  1.7/8.9/114  05-18 @ 08:08/0.98  --/--/--    MRSA: MRSA PCR Result.: Indeterminate By: Real-Time PCR (Polymerase Reaction Method) (05-18 @ 17:28)   / MSSA:   Urinalysis Basic - ( 18 May 2024 08:08 )    Color: x / Appearance: x / SG: x / pH: x  Gluc: 99 mg/dL / Ketone: x  / Bili: x / Urobili: x   Blood: x / Protein: x / Nitrite: x   Leuk Esterase: x / RBC: x / WBC x   Sq Epi: x / Non Sq Epi: x / Bacteria: x    ACC: [id number] EXAM:  CT ABDOMEN AND PELVIS   ORDERED BY: HENRY LEBLANC     ACC: [id number] EXAM:  CT CHEST   ORDERED BY: HENRY LEBLANC     PROCEDURE DATE:  05/18/2024      INTERPRETATION:  Reason for Exam:  Preoperative CT, evaluate aorta for   calcium in size.  CT of the chest was performed from the thoracic inlet to the level of the   adrenal glands without contrast injection. Coronal and sagittal images   have been submitted.    Comparison: None available    Findings:    Tubes/Lines: None    Lungs, Pleura, and Airways:There is no evidence of endobronchial   obstruction, bronchiectasis or honeycombing.    Mediastinum/Lymph Nodes:Enlarged left thyroid lobe measuring 6.2 cm   resulting in a rightward tracheal deviation approximately 1.5cm. There   is no mediastinal, axillary, or supraclavicular lymphadenopathy.    Heart/Great Vessels: The heart is normal in size. There are coronary   artery calcifications involving the left anterior descending, right   coronary artery and circumflex coronary arteries. No pericardial   effusion. The aorta is normal in size and measures 3.3 cm. The pulmonary   artery is normal in size and measures 3.2 cm.    ABDOMEN/PELVIS:    HEPATOBILIARY: Unremarkable.    SPLEEN: Unremarkable.    PANCREAS: Unremarkable.    ADRENAL GLANDS: Unremarkable.    KIDNEYS: Symmetric pattern of renal enhancement. No hydronephrosis   bilaterally.    ABDOMINOPELVIC NODES: No lymphadenopathy.    PELVIC ORGANS: Unremarkable.    PERITONEUM/MESENTERY/BOWEL: No bowel obstruction. No ascites or   pneumoperitoneum. Scattered colonic diverticuli without evidence of   diverticulitis. The appendix is normal.    BONES/SOFT TISSUES: Unremarkable.    OTHER: The aorta is normal in size with scattered calcifications..    IMPRESSION:  No acute intrathoracic or intra-abdominal pathology.  Enlarged left thyroid resulting in a mild rightward tracheal deviation.    --- End of Report ---    CXR:   ACC: [id number] EXAM:  XR CHEST PORTABLE ROUTINE 1V   ORDERED BY: HENRY LEBLANC     PROCEDURE DATE:  05/18/2024      INTERPRETATION:  Clinical History / Reason for exam: Chest pain    Comparison : Chest radiograph None.    Technique/Positioning: Frontal chest radiograph.    Findings:    Support devices: None.    Cardiac/mediastinum/hilum: Opacity overlying the left superior   mediastinum measuring approximately 6.2 cm with rightward deviation of   the trachea corresponding to enlarged thyroid seen on same-day CT.    Lung parenchyma/Pleura: No focal consolidation, effusion, or pneumothorax.    Skeleton/soft tissues: No acute osseous abnormalities.    Impression:    No radiographic evidence of acute cardiopulmonary disease.    Opacity overlying the left superior mediastinum measuring approximately   6.2 cm with rightward deviation of the trachea corresponding to enlarged   thyroid seen on same-day CT.    --- End of Report ---      EKG: NSR ay 82bpm, T wave inversions    Carotid Duplex:  mild stenosis b/l ; official report pending    PFT's: not done    Echocardiogram:    ACC: [id number] EXAM:  ECHO TTE WO CON COMP W DOPP                          PROCEDURE DATE:  05/19/2024          INTERPRETATION:   [place] [address]                Phone: [phone].   TRANSTHORACIC ECHOCARDIOGRAM REPORT        Patient Name:   AVIVA HOFFMAN Accession #: [id number]  Medical Rec #:  [id number]     Height:      68.0 in 172.7 cm  YOB: 1960    Weight:      185.0 lb 83.92 kg  Patient Age:    63 years      BSA:         1.98 m²  Patient Gender: M             BP:          118/77 mmHg      Date of Exam:        5/19/2024 10:20:18 AM  Referring Physician: Steven Kumar  Sonographer:         Leanne Mckeon [facility]  Reading Physician:   Julia Garcia MD, [facility].    Procedure:     2D Echo/Doppler/Color Doppler Complete.  Indications:   R07.9 - Chest Pain, unspecified  Diagnosis:     R07.9 - Chest pain, unspecified  Study Details: Technically difficult study.        Summary:   1. Technically difficult study.   2. Left ventricular ejection fraction, by visual estimation, is 60 to   65%.   3. Normal global left ventricular systolic function.      PHYSICIAN INTERPRETATION:  Left Ventricle: The left ventricular internal cavity size is normal.   Global LV systolic function was normal. Left ventricular ejection   fraction, by visual estimation, is 60 to 65%. Spectral Doppler shows   normal pattern of LV diastolic filling.  Right Ventricle: Normal right ventricular size and function.  Left Atrium: Normal left atrial size.  Right Atrium: Normal right atrial size.  Pericardium: No significant pericardial effusion seen on limited views.  Mitral Valve: The mitral valve is grossly normal. No evidence of mitral   stenosis. Trivial mitral regurgitation.  Tricuspid Valve: The tricuspid valve is not well visualized. No tricuspid   regurgitation visualized. Adequate TR velocity was not obtained to   accurately assess RVSP.  Aortic Valve: Trileaflet aortic valve with grossly normal opening. No   evidence of aortic stenosis. No aortic regurgitation visualized.  Pulmonic Valve: The pulmonic valve was not well visualized.  Aorta: The ascending aorta was not well visualized. Aortic root measured   at Sinus of Valsalva is normal.    LV DIASTOLIC FUNCTION:  MV Peak E: 0.90 m/s Decel Time: 180 msec  MV Peak A: 1.24 m/s  E/A Ratio: 0.72    Aortic Valve:  AoV VMax:    1.46 m/s AoV Area, Vmax:    2.75 cm² Vmax Indx:    1.39   cm²/m²  AoV VTI:     0.25 m   AoV Area, VTI:     2.95 cm² VTI Indx:     1.49   cm²/m²  AoV Pk Grad: 8.5 mmHg AoV Area, Mn Grad: 2.68 cm² Mn Grad Indx: 1.35   cm²/m²  AoV Mn Grad: 4.5 mmHg    LVOT Vmax: 1.28 m/s  LVOT VTI:  0.24 m  LVOT Diam: 2.00 cm    Mitral Valve:  MV P1/2 Time: 52.20 msec  MV Area, PHT: 4.21 cm²      [id number] Julia Garcia MD, [facility], Electronically signed on   5/19/2024 at 11:51:32 AM    Cardiac catheterization: LEFT HEART CATHETERIZATION                                    Left main mild distal disease     LAD: ostial 99/100  , distally supplied by collaterals from Diag and RCA (BRENDAN 0-1 flow)                        Diag: large, ostial 95% , distal minor irregulrities    Left Circumflex: mild to moderate disease   OM: small     Right Coronary Artery: mid 90% lesion , distal 80% lesion   RPDA mild disease   RPL 85 % lesion       Left arm /71  Right arm 126/78  5 Meter frailty: 3.2 sec, 2.67sec, 2.61sec      Gen: WN/WD NAD  Neuro: A&Ox3, nonfocal  Pulm: CTA B/L  CV: RRR, S1S2 +systolic murmur  Abd: Soft, NT, ND +BS  Ext: No edema, + peripheral pulses    Cardiac Surgery Risk Factors  CVA and/or carotid/cerebrovascular disease. Yes  No  Explain if Yes  Aortoiliac disease Yes  No  Explain if Yes  Previous MI   No  Explain if Yes- T wave inversions seen on EKG  Previos Cardiac Surgery Yes  No  Explain if Yes  Hemodynamics-Unstable or Shock Yes  No  Explain if Yes  Diabetes Yes  No  Explain if Yes  Hepatic Failure Yes  No  Explain if Yes  Renal failure and/or dialysis Yes  No  Explain if Yes  Heart failure-type-present or past Yes  No  Explain if Yes  COPD Yes  No  Explain if Yes  Immune System Deficiency Yes  No  Explain if Yes  Malignant Ventricular Arrhythmia Yes  No  Explain if Yes    STS Score:     Pt has AICD/PPM [ ] Yes  [x ] No             Brand Name:  Pre-op Beta Blocker ordered within 24 hrs of surgery (CABG ONLY)?  [x ] Yes  [ ] No  If not, Why?  Type & Cross  [ x] Yes  [ ] No  NPO after Midnight [x ] Yes  [ ] No  Pre-op ABX ordered, to be taped on chart:  [ x] Yes  [ ] No     Hibiclens/Peridex ordered [x ] Yes  [ ] No  Intraop on Hold: PRBCs, CXR, CATHLEEN [x ]   Consent obtained  [x ] Yes  [ ] No            Patient is a 62 yo male with HTN, HLD, and extensive CAD who is scheduled for CABG in the am  cont present tx as per ct surgeon  cad- cont asa, lopressor, and statin  npo after midnight  consent obtained  xanax prn for qhs for anxiety     Cardiac Surgery Pre-op Note:  CC: Patient is a 63y old  Male who presents with a chief complaint of Coronary artery Disease (17 May 2024 14:27)      Referring Physician: Linus, Primary cardiologist - Gerardo  Primary MD Khalil                                                                                     Surgeon: thaddeus  Procedure: 5/20- cabg    Allergies    No Known Allergies    Intolerances      HPI:    63y Male with PMH of HTN, HLD, ? CKD (Cr 1.5 on 5/16/24), who presented to his cardiologist for a routine check up and found to have an abnormal ECG.  Denies any chest pain, dizziness, n/v, diaphoresis, orthopnea, PND, LE edema, palpitations, syncope.  Then, pt had a stress echo on 5/15/24 which was + for apical, inferior, inferoseptal wall ischemia, and EF 40% with exercise, 66% @ rest.  He only exercised for 3 mins and 40 secs, and the test was stopped due to SOB.  Subsequent cardia cath demonstrated multi-vessel CAD.  He is now in CTU after being referred for CABG and is currently without complaints other than feeling anxious about surgery planned for tomorrow am.      PAST MEDICAL & SURGICAL HISTORY:  HTN (hypertension)    Hyperlipidemia    Chronic kidney disease (CKD)      No significant past surgical history    MEDICATIONS  (STANDING):  aspirin enteric coated 81 milliGRAM(s) Oral daily  atorvastatin 40 milliGRAM(s) Oral at bedtime  chlorhexidine 4% Liquid 1 Application(s) Topical once  heparin   Injectable 3000 Unit(s) SubCutaneous every 12 hours  melatonin 5 milliGRAM(s) Oral at bedtime  metoprolol tartrate 12.5 milliGRAM(s) Oral every 12 hours  pantoprazole    Tablet 40 milliGRAM(s) Oral before breakfast  sodium chloride 0.9% lock flush 3 milliLiter(s) IV Push every 8 hours  sodium chloride 0.9%. 1000 milliLiter(s) (75 mL/Hr) IV Continuous <Continuous>    MEDICATIONS  (PRN):  acetaminophen     Tablet .. 650 milliGRAM(s) Oral every 6 hours PRN Moderate Pain (4 - 6)      Labs:                        15.1   7.16  )-----------( 203      ( 18 May 2024 08:08 )             44.4     05-18    143  |  105  |  17  ----------------------------<  99  4.0   |  22  |  1.2    Ca    8.9      18 May 2024 08:08    TPro  6.3  /  Alb  4.2  /  TBili  0.9  /  DBili  x   /  AST  19  /  ALT  19  /  AlkPhos  56  05-18    PT/INR - ( 18 May 2024 08:08 )   PT: 11.20 sec;   INR: 0.98 ratio         PTT - ( 18 May 2024 08:08 )  PTT:32.8 sec    Blood Type:   HGB A1C: 6.0  Prealbumin:   Pro-BNP: 132  Thyroid Panel: 05-18 @ 13:18/0.82  1.7/8.9/114  05-18 @ 08:08/0.98  --/--/--    MRSA: MRSA PCR Result.: Indeterminate By: Real-Time PCR (Polymerase Reaction Method) (05-18 @ 17:28)   / MSSA:   Urinalysis Basic - ( 18 May 2024 08:08 )    Color: x / Appearance: x / SG: x / pH: x  Gluc: 99 mg/dL / Ketone: x  / Bili: x / Urobili: x   Blood: x / Protein: x / Nitrite: x   Leuk Esterase: x / RBC: x / WBC x   Sq Epi: x / Non Sq Epi: x / Bacteria: x    ACC: [id number] EXAM:  CT ABDOMEN AND PELVIS   ORDERED BY: HENRY LEBLANC     ACC: [id number] EXAM:  CT CHEST   ORDERED BY: HENRY LEBLANC     PROCEDURE DATE:  05/18/2024      INTERPRETATION:  Reason for Exam:  Preoperative CT, evaluate aorta for   calcium in size.  CT of the chest was performed from the thoracic inlet to the level of the   adrenal glands without contrast injection. Coronal and sagittal images   have been submitted.    Comparison: None available    Findings:    Tubes/Lines: None    Lungs, Pleura, and Airways:There is no evidence of endobronchial   obstruction, bronchiectasis or honeycombing.    Mediastinum/Lymph Nodes:Enlarged left thyroid lobe measuring 6.2 cm   resulting in a rightward tracheal deviation approximately 1.5cm. There   is no mediastinal, axillary, or supraclavicular lymphadenopathy.    Heart/Great Vessels: The heart is normal in size. There are coronary   artery calcifications involving the left anterior descending, right   coronary artery and circumflex coronary arteries. No pericardial   effusion. The aorta is normal in size and measures 3.3 cm. The pulmonary   artery is normal in size and measures 3.2 cm.    ABDOMEN/PELVIS:    HEPATOBILIARY: Unremarkable.    SPLEEN: Unremarkable.    PANCREAS: Unremarkable.    ADRENAL GLANDS: Unremarkable.    KIDNEYS: Symmetric pattern of renal enhancement. No hydronephrosis   bilaterally.    ABDOMINOPELVIC NODES: No lymphadenopathy.    PELVIC ORGANS: Unremarkable.    PERITONEUM/MESENTERY/BOWEL: No bowel obstruction. No ascites or   pneumoperitoneum. Scattered colonic diverticuli without evidence of   diverticulitis. The appendix is normal.    BONES/SOFT TISSUES: Unremarkable.    OTHER: The aorta is normal in size with scattered calcifications..    IMPRESSION:  No acute intrathoracic or intra-abdominal pathology.  Enlarged left thyroid resulting in a mild rightward tracheal deviation.    --- End of Report ---    CXR:   ACC: [id number] EXAM:  XR CHEST PORTABLE ROUTINE 1V   ORDERED BY: HENRY LEBLANC     PROCEDURE DATE:  05/18/2024      INTERPRETATION:  Clinical History / Reason for exam: Chest pain    Comparison : Chest radiograph None.    Technique/Positioning: Frontal chest radiograph.    Findings:    Support devices: None.    Cardiac/mediastinum/hilum: Opacity overlying the left superior   mediastinum measuring approximately 6.2 cm with rightward deviation of   the trachea corresponding to enlarged thyroid seen on same-day CT.    Lung parenchyma/Pleura: No focal consolidation, effusion, or pneumothorax.    Skeleton/soft tissues: No acute osseous abnormalities.    Impression:    No radiographic evidence of acute cardiopulmonary disease.    Opacity overlying the left superior mediastinum measuring approximately   6.2 cm with rightward deviation of the trachea corresponding to enlarged   thyroid seen on same-day CT.    --- End of Report ---      EKG: NSR ay 82bpm, T wave inversions    Carotid Duplex:  mild stenosis b/l ; official report pending    PFT's: not done    Echocardiogram:    ACC: [id number] EXAM:  ECHO TTE WO CON COMP W DOPP                          PROCEDURE DATE:  05/19/2024          INTERPRETATION:   [place] [address]                Phone: [phone].   TRANSTHORACIC ECHOCARDIOGRAM REPORT        Patient Name:   AVIVA HOFFMAN Accession #: [id number]  Medical Rec #:  [id number]     Height:      68.0 in 172.7 cm  YOB: 1960    Weight:      185.0 lb 83.92 kg  Patient Age:    63 years      BSA:         1.98 m²  Patient Gender: M             BP:          118/77 mmHg      Date of Exam:        5/19/2024 10:20:18 AM  Referring Physician: Steven Kumar  Sonographer:         Leanne Mckeon [facility]  Reading Physician:   Julia Garcia MD, [facility].    Procedure:     2D Echo/Doppler/Color Doppler Complete.  Indications:   R07.9 - Chest Pain, unspecified  Diagnosis:     R07.9 - Chest pain, unspecified  Study Details: Technically difficult study.        Summary:   1. Technically difficult study.   2. Left ventricular ejection fraction, by visual estimation, is 60 to   65%.   3. Normal global left ventricular systolic function.      PHYSICIAN INTERPRETATION:  Left Ventricle: The left ventricular internal cavity size is normal.   Global LV systolic function was normal. Left ventricular ejection   fraction, by visual estimation, is 60 to 65%. Spectral Doppler shows   normal pattern of LV diastolic filling.  Right Ventricle: Normal right ventricular size and function.  Left Atrium: Normal left atrial size.  Right Atrium: Normal right atrial size.  Pericardium: No significant pericardial effusion seen on limited views.  Mitral Valve: The mitral valve is grossly normal. No evidence of mitral   stenosis. Trivial mitral regurgitation.  Tricuspid Valve: The tricuspid valve is not well visualized. No tricuspid   regurgitation visualized. Adequate TR velocity was not obtained to   accurately assess RVSP.  Aortic Valve: Trileaflet aortic valve with grossly normal opening. No   evidence of aortic stenosis. No aortic regurgitation visualized.  Pulmonic Valve: The pulmonic valve was not well visualized.  Aorta: The ascending aorta was not well visualized. Aortic root measured   at Sinus of Valsalva is normal.    LV DIASTOLIC FUNCTION:  MV Peak E: 0.90 m/s Decel Time: 180 msec  MV Peak A: 1.24 m/s  E/A Ratio: 0.72    Aortic Valve:  AoV VMax:    1.46 m/s AoV Area, Vmax:    2.75 cm² Vmax Indx:    1.39   cm²/m²  AoV VTI:     0.25 m   AoV Area, VTI:     2.95 cm² VTI Indx:     1.49   cm²/m²  AoV Pk Grad: 8.5 mmHg AoV Area, Mn Grad: 2.68 cm² Mn Grad Indx: 1.35   cm²/m²  AoV Mn Grad: 4.5 mmHg    LVOT Vmax: 1.28 m/s  LVOT VTI:  0.24 m  LVOT Diam: 2.00 cm    Mitral Valve:  MV P1/2 Time: 52.20 msec  MV Area, PHT: 4.21 cm²      [id number] Julia Garcia MD, [facility], Electronically signed on   5/19/2024 at 11:51:32 AM    Cardiac catheterization: LEFT HEART CATHETERIZATION                                    Left main mild distal disease     LAD: ostial 99/100  , distally supplied by collaterals from Diag and RCA (BRENDAN 0-1 flow)                        Diag: large, ostial 95% , distal minor irregulrities    Left Circumflex: mild to moderate disease   OM: small     Right Coronary Artery: mid 90% lesion , distal 80% lesion   RPDA mild disease   RPL 85 % lesion       Left arm /71  Right arm 126/78  5 Meter frailty: 3.2 sec, 2.67sec, 2.61sec      Gen: WN/WD NAD  Neuro: A&Ox3, nonfocal  Pulm: CTA B/L  CV: RRR, S1S2 +systolic murmur  Abd: Soft, NT, ND +BS  Ext: No edema, + peripheral pulses    Cardiac Surgery Risk Factors  CVA and/or carotid/cerebrovascular disease. Yes  No  Explain if Yes  Aortoiliac disease Yes  No  Explain if Yes  Previous MI   No  Explain if Yes- T wave inversions seen on EKG  Previos Cardiac Surgery Yes  No  Explain if Yes  Hemodynamics-Unstable or Shock Yes  No  Explain if Yes  Diabetes Yes  No  Explain if Yes  Hepatic Failure Yes  No  Explain if Yes  Renal failure and/or dialysis Yes  No  Explain if Yes  Heart failure-type-present or past Yes  No  Explain if Yes  COPD Yes  No  Explain if Yes  Immune System Deficiency Yes  No  Explain if Yes  Malignant Ventricular Arrhythmia Yes  No  Explain if Yes    STS Score:   Procedure Type: Isolated CABG  PERIOPERATIVE OUTCOME	ESTIMATE %  Operative Mortality	0.588%  Morbidity & Mortality	4.9%  Stroke	1%  Renal Failure	0.76%  Reoperation	2.86%  Prolonged Ventilation	2.32%  Deep Sternal Wound Infection	0.108%  Long Hospital Stay (>14 days)	2.15%  Short Hospital Stay (<6 days)*	59.7%      Pt has AICD/PPM [ ] Yes  [x ] No             Brand Name:  Pre-op Beta Blocker ordered within 24 hrs of surgery (CABG ONLY)?  [x ] Yes  [ ] No  If not, Why?  Type & Cross  [ x] Yes  [ ] No  NPO after Midnight [x ] Yes  [ ] No  Pre-op ABX ordered, to be taped on chart:  [ x] Yes  [ ] No     Hibiclens/Peridex ordered [x ] Yes  [ ] No  Intraop on Hold: PRBCs, CXR, CATHLEEN [x ]   Consent obtained  [x ] Yes  [ ] No            Patient is a 64 yo male with HTN, HLD, and extensive CAD who is scheduled for CABG in the am  cont present tx as per ct surgeon  cad- cont asa, lopressor, and statin  npo after midnight  consent obtained  xanax prn for qhs for anxiety

## 2024-05-19 NOTE — PRE-ANESTHESIA EVALUATION ADULT - NSRADCARDRESULTSFT_GEN_ALL_CORE
CT Chest/Calcifications: Impression: Enlarged left sided thyroid resulting in R tracheal deviation    Left Heart Cath: LAD ostial portion 99% occluded  Diagonal 95%  RCA mid portion 90%  Distal 80%  RPL 85%     Exercise Stress Echo:  LVEF at rest 66%  40% with exercise  Positive for apical, inferior, and inferoseptal wall motion abnormality

## 2024-05-19 NOTE — PRE-ANESTHESIA EVALUATION ADULT - NSANTHPROCED_GEN_ALL_CORE
Possible Erector Spinae Plane/Arterial Catheter/Central Venous Catheter/Pulmonary Artery Catheter/Transesophageal Echocardiogram/Regional Block Arterial Catheter/Central Venous Catheter/Pulmonary Artery Catheter/Transesophageal Echocardiogram

## 2024-05-20 ENCOUNTER — APPOINTMENT (OUTPATIENT)
Dept: CARDIOLOGY | Facility: CLINIC | Age: 64
End: 2024-05-20

## 2024-05-20 ENCOUNTER — APPOINTMENT (OUTPATIENT)
Dept: CARDIOTHORACIC SURGERY | Facility: CLINIC | Age: 64
End: 2024-05-20

## 2024-05-20 RX ORDER — METOPROLOL TARTRATE 50 MG
12.5 TABLET ORAL ONCE
Refills: 0 | Status: COMPLETED | OUTPATIENT
Start: 2024-05-20 | End: 2024-05-20

## 2024-05-20 RX ADMIN — SODIUM CHLORIDE 3 MILLILITER(S): 9 INJECTION INTRAMUSCULAR; INTRAVENOUS; SUBCUTANEOUS at 21:10

## 2024-05-20 RX ADMIN — CHLORHEXIDINE GLUCONATE 5 MILLILITER(S): 213 SOLUTION TOPICAL at 05:29

## 2024-05-20 RX ADMIN — MUPIROCIN 1 APPLICATION(S): 20 OINTMENT TOPICAL at 17:15

## 2024-05-20 RX ADMIN — Medication 5 MILLIGRAM(S): at 21:32

## 2024-05-20 RX ADMIN — SODIUM CHLORIDE 3 MILLILITER(S): 9 INJECTION INTRAMUSCULAR; INTRAVENOUS; SUBCUTANEOUS at 05:25

## 2024-05-20 RX ADMIN — SODIUM CHLORIDE 3 MILLILITER(S): 9 INJECTION INTRAMUSCULAR; INTRAVENOUS; SUBCUTANEOUS at 14:00

## 2024-05-20 RX ADMIN — Medication 12.5 MILLIGRAM(S): at 21:32

## 2024-05-20 RX ADMIN — MUPIROCIN 1 APPLICATION(S): 20 OINTMENT TOPICAL at 05:30

## 2024-05-20 RX ADMIN — Medication 12.5 MILLIGRAM(S): at 17:15

## 2024-05-20 RX ADMIN — ATORVASTATIN CALCIUM 40 MILLIGRAM(S): 80 TABLET, FILM COATED ORAL at 21:32

## 2024-05-20 NOTE — PRE-OP CHECKLIST - SELECT TESTS ORDERED
CBC/CMP/PT/PTT/INR/Hepatic Function/Type and Cross/Type and Screen/Urinalysis/EKG/CXR/Results in MD note

## 2024-05-21 LAB
ALBUMIN SERPL ELPH-MCNC: 3.2 G/DL — LOW (ref 3.5–5.2)
ALBUMIN SERPL ELPH-MCNC: 4.1 G/DL — SIGNIFICANT CHANGE UP (ref 3.5–5.2)
ALP SERPL-CCNC: 43 U/L — SIGNIFICANT CHANGE UP (ref 30–115)
ALP SERPL-CCNC: 53 U/L — SIGNIFICANT CHANGE UP (ref 30–115)
ALT FLD-CCNC: 25 U/L — SIGNIFICANT CHANGE UP (ref 0–41)
ALT FLD-CCNC: 31 U/L — SIGNIFICANT CHANGE UP (ref 0–41)
ANION GAP SERPL CALC-SCNC: 10 MMOL/L — SIGNIFICANT CHANGE UP (ref 7–14)
ANION GAP SERPL CALC-SCNC: 6 MMOL/L — LOW (ref 7–14)
APO LP(A) SERPL-MCNC: 97.9 NMOL/L
APTT BLD: 30.2 SEC — SIGNIFICANT CHANGE UP (ref 27–39.2)
AST SERPL-CCNC: 26 U/L — SIGNIFICANT CHANGE UP (ref 0–41)
AST SERPL-CCNC: 37 U/L — SIGNIFICANT CHANGE UP (ref 0–41)
BASOPHILS # BLD AUTO: 0.02 K/UL — SIGNIFICANT CHANGE UP (ref 0–0.2)
BASOPHILS NFR BLD AUTO: 0.1 % — SIGNIFICANT CHANGE UP (ref 0–1)
BILIRUB SERPL-MCNC: 0.5 MG/DL — SIGNIFICANT CHANGE UP (ref 0.2–1.2)
BILIRUB SERPL-MCNC: 0.7 MG/DL — SIGNIFICANT CHANGE UP (ref 0.2–1.2)
BUN SERPL-MCNC: 21 MG/DL — HIGH (ref 10–20)
BUN SERPL-MCNC: 31 MG/DL — HIGH (ref 10–20)
CALCIUM SERPL-MCNC: 8.1 MG/DL — LOW (ref 8.4–10.5)
CALCIUM SERPL-MCNC: 9.5 MG/DL — SIGNIFICANT CHANGE UP (ref 8.4–10.5)
CHLORIDE SERPL-SCNC: 102 MMOL/L — SIGNIFICANT CHANGE UP (ref 98–110)
CHLORIDE SERPL-SCNC: 106 MMOL/L — SIGNIFICANT CHANGE UP (ref 98–110)
CO2 SERPL-SCNC: 23 MMOL/L — SIGNIFICANT CHANGE UP (ref 17–32)
CO2 SERPL-SCNC: 26 MMOL/L — SIGNIFICANT CHANGE UP (ref 17–32)
CREAT SERPL-MCNC: 1.1 MG/DL — SIGNIFICANT CHANGE UP (ref 0.7–1.5)
CREAT SERPL-MCNC: 1.6 MG/DL — HIGH (ref 0.7–1.5)
EGFR: 48 ML/MIN/1.73M2 — LOW
EGFR: 75 ML/MIN/1.73M2 — SIGNIFICANT CHANGE UP
EOSINOPHIL # BLD AUTO: 0.02 K/UL — SIGNIFICANT CHANGE UP (ref 0–0.7)
EOSINOPHIL NFR BLD AUTO: 0.1 % — SIGNIFICANT CHANGE UP (ref 0–8)
GAS PNL BLDA: SIGNIFICANT CHANGE UP
GLUCOSE BLDC GLUCOMTR-MCNC: 120 MG/DL — HIGH (ref 70–99)
GLUCOSE BLDC GLUCOMTR-MCNC: 128 MG/DL — HIGH (ref 70–99)
GLUCOSE BLDC GLUCOMTR-MCNC: 135 MG/DL — HIGH (ref 70–99)
GLUCOSE BLDC GLUCOMTR-MCNC: 137 MG/DL — HIGH (ref 70–99)
GLUCOSE BLDC GLUCOMTR-MCNC: 161 MG/DL — HIGH (ref 70–99)
GLUCOSE SERPL-MCNC: 121 MG/DL — HIGH (ref 70–99)
GLUCOSE SERPL-MCNC: 142 MG/DL — HIGH (ref 70–99)
HCT VFR BLD CALC: 28.3 % — LOW (ref 42–52)
HCT VFR BLD CALC: 39.8 % — LOW (ref 42–52)
HCT VFR BLD CALC: 42.3 % — SIGNIFICANT CHANGE UP (ref 42–52)
HGB BLD-MCNC: 13.8 G/DL — LOW (ref 14–18)
HGB BLD-MCNC: 14.4 G/DL — SIGNIFICANT CHANGE UP (ref 14–18)
HGB BLD-MCNC: 9.7 G/DL — LOW (ref 14–18)
IMM GRANULOCYTES NFR BLD AUTO: 0.6 % — HIGH (ref 0.1–0.3)
INR BLD: 1.08 RATIO — SIGNIFICANT CHANGE UP (ref 0.65–1.3)
LYMPHOCYTES # BLD AUTO: 1.7 K/UL — SIGNIFICANT CHANGE UP (ref 1.2–3.4)
LYMPHOCYTES # BLD AUTO: 11.9 % — LOW (ref 20.5–51.1)
MAGNESIUM SERPL-MCNC: 3.4 MG/DL — CRITICAL HIGH (ref 1.8–2.4)
MCHC RBC-ENTMCNC: 28.7 PG — SIGNIFICANT CHANGE UP (ref 27–31)
MCHC RBC-ENTMCNC: 28.8 PG — SIGNIFICANT CHANGE UP (ref 27–31)
MCHC RBC-ENTMCNC: 28.9 PG — SIGNIFICANT CHANGE UP (ref 27–31)
MCHC RBC-ENTMCNC: 34 G/DL — SIGNIFICANT CHANGE UP (ref 32–37)
MCHC RBC-ENTMCNC: 34.3 G/DL — SIGNIFICANT CHANGE UP (ref 32–37)
MCHC RBC-ENTMCNC: 34.7 G/DL — SIGNIFICANT CHANGE UP (ref 32–37)
MCV RBC AUTO: 82.7 FL — SIGNIFICANT CHANGE UP (ref 80–94)
MCV RBC AUTO: 84.2 FL — SIGNIFICANT CHANGE UP (ref 80–94)
MCV RBC AUTO: 84.6 FL — SIGNIFICANT CHANGE UP (ref 80–94)
MONOCYTES # BLD AUTO: 0.74 K/UL — HIGH (ref 0.1–0.6)
MONOCYTES NFR BLD AUTO: 5.2 % — SIGNIFICANT CHANGE UP (ref 1.7–9.3)
NEUTROPHILS # BLD AUTO: 11.77 K/UL — HIGH (ref 1.4–6.5)
NEUTROPHILS NFR BLD AUTO: 82.1 % — HIGH (ref 42.2–75.2)
NRBC # BLD: 0 /100 WBCS — SIGNIFICANT CHANGE UP (ref 0–0)
PLATELET # BLD AUTO: 135 K/UL — SIGNIFICANT CHANGE UP (ref 130–400)
PLATELET # BLD AUTO: 148 K/UL — SIGNIFICANT CHANGE UP (ref 130–400)
PLATELET # BLD AUTO: 188 K/UL — SIGNIFICANT CHANGE UP (ref 130–400)
PMV BLD: 10 FL — SIGNIFICANT CHANGE UP (ref 7.4–10.4)
PMV BLD: 10.3 FL — SIGNIFICANT CHANGE UP (ref 7.4–10.4)
PMV BLD: 10.8 FL — HIGH (ref 7.4–10.4)
POTASSIUM SERPL-MCNC: 4.2 MMOL/L — SIGNIFICANT CHANGE UP (ref 3.5–5)
POTASSIUM SERPL-MCNC: 4.3 MMOL/L — SIGNIFICANT CHANGE UP (ref 3.5–5)
POTASSIUM SERPL-SCNC: 4.2 MMOL/L — SIGNIFICANT CHANGE UP (ref 3.5–5)
POTASSIUM SERPL-SCNC: 4.3 MMOL/L — SIGNIFICANT CHANGE UP (ref 3.5–5)
PROT SERPL-MCNC: 4.8 G/DL — LOW (ref 6–8)
PROT SERPL-MCNC: 6 G/DL — SIGNIFICANT CHANGE UP (ref 6–8)
PROTHROM AB SERPL-ACNC: 12.3 SEC — SIGNIFICANT CHANGE UP (ref 9.95–12.87)
RBC # BLD: 3.36 M/UL — LOW (ref 4.7–6.1)
RBC # BLD: 4.81 M/UL — SIGNIFICANT CHANGE UP (ref 4.7–6.1)
RBC # BLD: 5 M/UL — SIGNIFICANT CHANGE UP (ref 4.7–6.1)
RBC # FLD: 12.5 % — SIGNIFICANT CHANGE UP (ref 11.5–14.5)
RBC # FLD: 12.7 % — SIGNIFICANT CHANGE UP (ref 11.5–14.5)
RBC # FLD: 12.7 % — SIGNIFICANT CHANGE UP (ref 11.5–14.5)
SODIUM SERPL-SCNC: 134 MMOL/L — LOW (ref 135–146)
SODIUM SERPL-SCNC: 139 MMOL/L — SIGNIFICANT CHANGE UP (ref 135–146)
WBC # BLD: 14.34 K/UL — HIGH (ref 4.8–10.8)
WBC # BLD: 19.06 K/UL — HIGH (ref 4.8–10.8)
WBC # BLD: 8.33 K/UL — SIGNIFICANT CHANGE UP (ref 4.8–10.8)
WBC # FLD AUTO: 14.34 K/UL — HIGH (ref 4.8–10.8)
WBC # FLD AUTO: 19.06 K/UL — HIGH (ref 4.8–10.8)
WBC # FLD AUTO: 8.33 K/UL — SIGNIFICANT CHANGE UP (ref 4.8–10.8)

## 2024-05-21 PROCEDURE — 33519 CABG ARTERY-VEIN THREE: CPT | Mod: AS

## 2024-05-21 PROCEDURE — 71045 X-RAY EXAM CHEST 1 VIEW: CPT | Mod: 26

## 2024-05-21 PROCEDURE — 93010 ELECTROCARDIOGRAM REPORT: CPT

## 2024-05-21 PROCEDURE — 33519 CABG ARTERY-VEIN THREE: CPT

## 2024-05-21 PROCEDURE — 33533 CABG ARTERIAL SINGLE: CPT

## 2024-05-21 PROCEDURE — 33533 CABG ARTERIAL SINGLE: CPT | Mod: AS

## 2024-05-21 PROCEDURE — 33508 ENDOSCOPIC VEIN HARVEST: CPT | Mod: 59

## 2024-05-21 RX ORDER — DOBUTAMINE HCL 250MG/20ML
5 VIAL (ML) INTRAVENOUS
Qty: 500 | Refills: 0 | Status: DISCONTINUED | OUTPATIENT
Start: 2024-05-21 | End: 2024-05-21

## 2024-05-21 RX ORDER — ONDANSETRON 8 MG/1
4 TABLET, FILM COATED ORAL EVERY 4 HOURS
Refills: 0 | Status: DISCONTINUED | OUTPATIENT
Start: 2024-05-21 | End: 2024-05-29

## 2024-05-21 RX ORDER — HYDROMORPHONE HYDROCHLORIDE 2 MG/ML
0.5 INJECTION INTRAMUSCULAR; INTRAVENOUS; SUBCUTANEOUS EVERY 6 HOURS
Refills: 0 | Status: DISCONTINUED | OUTPATIENT
Start: 2024-05-21 | End: 2024-05-21

## 2024-05-21 RX ORDER — CHLORHEXIDINE GLUCONATE 213 G/1000ML
15 SOLUTION TOPICAL EVERY 12 HOURS
Refills: 0 | Status: DISCONTINUED | OUTPATIENT
Start: 2024-05-21 | End: 2024-05-22

## 2024-05-21 RX ORDER — ACETAMINOPHEN 500 MG
1000 TABLET ORAL ONCE
Refills: 0 | Status: COMPLETED | OUTPATIENT
Start: 2024-05-21 | End: 2024-05-21

## 2024-05-21 RX ORDER — INSULIN HUMAN 100 [IU]/ML
5 INJECTION, SOLUTION SUBCUTANEOUS
Qty: 100 | Refills: 0 | Status: DISCONTINUED | OUTPATIENT
Start: 2024-05-21 | End: 2024-05-22

## 2024-05-21 RX ORDER — DEXTROSE 50 % IN WATER 50 %
50 SYRINGE (ML) INTRAVENOUS
Refills: 0 | Status: DISCONTINUED | OUTPATIENT
Start: 2024-05-21 | End: 2024-05-25

## 2024-05-21 RX ORDER — CEFAZOLIN SODIUM 1 G
1000 VIAL (EA) INJECTION EVERY 8 HOURS
Refills: 0 | Status: COMPLETED | OUTPATIENT
Start: 2024-05-21 | End: 2024-05-23

## 2024-05-21 RX ORDER — OXYCODONE HYDROCHLORIDE 5 MG/1
5 TABLET ORAL EVERY 4 HOURS
Refills: 0 | Status: DISCONTINUED | OUTPATIENT
Start: 2024-05-21 | End: 2024-05-22

## 2024-05-21 RX ORDER — SODIUM CHLORIDE 9 MG/ML
1000 INJECTION INTRAMUSCULAR; INTRAVENOUS; SUBCUTANEOUS
Refills: 0 | Status: DISCONTINUED | OUTPATIENT
Start: 2024-05-21 | End: 2024-05-24

## 2024-05-21 RX ORDER — DEXTROSE 50 % IN WATER 50 %
25 SYRINGE (ML) INTRAVENOUS
Refills: 0 | Status: DISCONTINUED | OUTPATIENT
Start: 2024-05-21 | End: 2024-05-25

## 2024-05-21 RX ORDER — DEXMEDETOMIDINE HYDROCHLORIDE IN 0.9% SODIUM CHLORIDE 4 UG/ML
1 INJECTION INTRAVENOUS
Qty: 200 | Refills: 0 | Status: DISCONTINUED | OUTPATIENT
Start: 2024-05-21 | End: 2024-05-22

## 2024-05-21 RX ORDER — NICARDIPINE HYDROCHLORIDE 30 MG/1
5 CAPSULE, EXTENDED RELEASE ORAL
Qty: 40 | Refills: 0 | Status: DISCONTINUED | OUTPATIENT
Start: 2024-05-21 | End: 2024-05-22

## 2024-05-21 RX ORDER — FENTANYL CITRATE 50 UG/ML
25 INJECTION INTRAVENOUS EVERY 4 HOURS
Refills: 0 | Status: DISCONTINUED | OUTPATIENT
Start: 2024-05-21 | End: 2024-05-21

## 2024-05-21 RX ORDER — ACETAMINOPHEN 500 MG
650 TABLET ORAL EVERY 6 HOURS
Refills: 0 | Status: DISCONTINUED | OUTPATIENT
Start: 2024-05-21 | End: 2024-05-23

## 2024-05-21 RX ORDER — NITROGLYCERIN 6.5 MG
15 CAPSULE, EXTENDED RELEASE ORAL
Qty: 50 | Refills: 0 | Status: DISCONTINUED | OUTPATIENT
Start: 2024-05-21 | End: 2024-05-22

## 2024-05-21 RX ORDER — POLYETHYLENE GLYCOL 3350 17 G/17G
17 POWDER, FOR SOLUTION ORAL DAILY
Refills: 0 | Status: DISCONTINUED | OUTPATIENT
Start: 2024-05-22 | End: 2024-05-25

## 2024-05-21 RX ORDER — ACETAMINOPHEN 500 MG
1000 TABLET ORAL ONCE
Refills: 0 | Status: COMPLETED | OUTPATIENT
Start: 2024-05-23 | End: 2024-05-23

## 2024-05-21 RX ORDER — FAMOTIDINE 10 MG/ML
20 INJECTION INTRAVENOUS EVERY 12 HOURS
Refills: 0 | Status: DISCONTINUED | OUTPATIENT
Start: 2024-05-21 | End: 2024-05-23

## 2024-05-21 RX ORDER — ACETAMINOPHEN 500 MG
1000 TABLET ORAL ONCE
Refills: 0 | Status: COMPLETED | OUTPATIENT
Start: 2024-05-22 | End: 2024-05-22

## 2024-05-21 RX ORDER — FENTANYL CITRATE 50 UG/ML
50 INJECTION INTRAVENOUS ONCE
Refills: 0 | Status: DISCONTINUED | OUTPATIENT
Start: 2024-05-21 | End: 2024-05-21

## 2024-05-21 RX ORDER — VANCOMYCIN HCL 1 G
1000 VIAL (EA) INTRAVENOUS EVERY 12 HOURS
Refills: 0 | Status: COMPLETED | OUTPATIENT
Start: 2024-05-21 | End: 2024-05-22

## 2024-05-21 RX ORDER — MEPERIDINE HYDROCHLORIDE 50 MG/ML
25 INJECTION INTRAMUSCULAR; INTRAVENOUS; SUBCUTANEOUS ONCE
Refills: 0 | Status: DISCONTINUED | OUTPATIENT
Start: 2024-05-21 | End: 2024-05-25

## 2024-05-21 RX ORDER — ACETAMINOPHEN 500 MG
1000 TABLET ORAL ONCE
Refills: 0 | Status: COMPLETED | OUTPATIENT
Start: 2024-05-21 | End: 2024-05-23

## 2024-05-21 RX ORDER — CHLORHEXIDINE GLUCONATE 213 G/1000ML
1 SOLUTION TOPICAL DAILY
Refills: 0 | Status: DISCONTINUED | OUTPATIENT
Start: 2024-05-21 | End: 2024-05-29

## 2024-05-21 RX ORDER — NOREPINEPHRINE BITARTRATE/D5W 8 MG/250ML
0.05 PLASTIC BAG, INJECTION (ML) INTRAVENOUS
Qty: 8 | Refills: 0 | Status: DISCONTINUED | OUTPATIENT
Start: 2024-05-21 | End: 2024-05-22

## 2024-05-21 RX ORDER — ASPIRIN/CALCIUM CARB/MAGNESIUM 324 MG
300 TABLET ORAL ONCE
Refills: 0 | Status: DISCONTINUED | OUTPATIENT
Start: 2024-05-21 | End: 2024-05-22

## 2024-05-21 RX ORDER — ALBUMIN HUMAN 25 %
500 VIAL (ML) INTRAVENOUS ONCE
Refills: 0 | Status: COMPLETED | OUTPATIENT
Start: 2024-05-21 | End: 2024-05-21

## 2024-05-21 RX ORDER — VASOPRESSIN 20 [USP'U]/ML
0.04 INJECTION INTRAVENOUS
Qty: 40 | Refills: 0 | Status: DISCONTINUED | OUTPATIENT
Start: 2024-05-21 | End: 2024-05-22

## 2024-05-21 RX ORDER — ACETAMINOPHEN 500 MG
1000 TABLET ORAL ONCE
Refills: 0 | Status: COMPLETED | OUTPATIENT
Start: 2024-05-23 | End: 2024-05-22

## 2024-05-21 RX ORDER — AMIODARONE HYDROCHLORIDE 400 MG/1
0.5 TABLET ORAL
Qty: 450 | Refills: 0 | Status: DISCONTINUED | OUTPATIENT
Start: 2024-05-21 | End: 2024-05-23

## 2024-05-21 RX ORDER — OXYCODONE HYDROCHLORIDE 5 MG/1
10 TABLET ORAL EVERY 4 HOURS
Refills: 0 | Status: DISCONTINUED | OUTPATIENT
Start: 2024-05-21 | End: 2024-05-23

## 2024-05-21 RX ORDER — ACETAMINOPHEN 500 MG
1000 TABLET ORAL ONCE
Refills: 0 | Status: COMPLETED | OUTPATIENT
Start: 2024-05-22 | End: 2024-05-21

## 2024-05-21 RX ORDER — KETOROLAC TROMETHAMINE 30 MG/ML
15 SYRINGE (ML) INJECTION EVERY 4 HOURS
Refills: 0 | Status: DISCONTINUED | OUTPATIENT
Start: 2024-05-21 | End: 2024-05-26

## 2024-05-21 RX ORDER — SENNA PLUS 8.6 MG/1
2 TABLET ORAL AT BEDTIME
Refills: 0 | Status: DISCONTINUED | OUTPATIENT
Start: 2024-05-22 | End: 2024-05-25

## 2024-05-21 RX ORDER — KETOROLAC TROMETHAMINE 30 MG/ML
15 SYRINGE (ML) INJECTION ONCE
Refills: 0 | Status: DISCONTINUED | OUTPATIENT
Start: 2024-05-21 | End: 2024-05-21

## 2024-05-21 RX ORDER — PROPOFOL 10 MG/ML
15 INJECTION, EMULSION INTRAVENOUS
Qty: 1000 | Refills: 0 | Status: DISCONTINUED | OUTPATIENT
Start: 2024-05-21 | End: 2024-05-22

## 2024-05-21 RX ORDER — DOBUTAMINE HCL 250MG/20ML
3 VIAL (ML) INTRAVENOUS
Qty: 500 | Refills: 0 | Status: DISCONTINUED | OUTPATIENT
Start: 2024-05-21 | End: 2024-05-23

## 2024-05-21 RX ADMIN — MUPIROCIN 1 APPLICATION(S): 20 OINTMENT TOPICAL at 05:57

## 2024-05-21 RX ADMIN — Medication 7.47 MICROGRAM(S)/KG/MIN: at 20:07

## 2024-05-21 RX ADMIN — FAMOTIDINE 20 MILLIGRAM(S): 10 INJECTION INTRAVENOUS at 18:53

## 2024-05-21 RX ADMIN — Medication 7.78 MICROGRAM(S)/KG/MIN: at 19:38

## 2024-05-21 RX ADMIN — FENTANYL CITRATE 50 MICROGRAM(S): 50 INJECTION INTRAVENOUS at 16:30

## 2024-05-21 RX ADMIN — ONDANSETRON 4 MILLIGRAM(S): 8 TABLET, FILM COATED ORAL at 19:20

## 2024-05-21 RX ADMIN — INSULIN HUMAN 5 UNIT(S)/HR: 100 INJECTION, SOLUTION SUBCUTANEOUS at 19:38

## 2024-05-21 RX ADMIN — Medication 15 MILLIGRAM(S): at 18:55

## 2024-05-21 RX ADMIN — SODIUM CHLORIDE 20 MILLILITER(S): 9 INJECTION INTRAMUSCULAR; INTRAVENOUS; SUBCUTANEOUS at 19:38

## 2024-05-21 RX ADMIN — AMIODARONE HYDROCHLORIDE 16.7 MG/MIN: 400 TABLET ORAL at 21:27

## 2024-05-21 RX ADMIN — Medication 50 MILLILITER(S): at 16:30

## 2024-05-21 RX ADMIN — CHLORHEXIDINE GLUCONATE 15 MILLILITER(S): 213 SOLUTION TOPICAL at 18:22

## 2024-05-21 RX ADMIN — Medication 400 MILLIGRAM(S): at 23:29

## 2024-05-21 RX ADMIN — Medication 100 MILLIGRAM(S): at 21:27

## 2024-05-21 RX ADMIN — Medication 400 MILLIGRAM(S): at 18:20

## 2024-05-21 RX ADMIN — Medication 250 MILLIGRAM(S): at 18:22

## 2024-05-21 RX ADMIN — Medication 100 MILLIGRAM(S): at 18:22

## 2024-05-21 RX ADMIN — CHLORHEXIDINE GLUCONATE 15 MILLILITER(S): 213 SOLUTION TOPICAL at 18:23

## 2024-05-21 RX ADMIN — SODIUM CHLORIDE 3 MILLILITER(S): 9 INJECTION INTRAMUSCULAR; INTRAVENOUS; SUBCUTANEOUS at 05:37

## 2024-05-21 NOTE — BRIEF OPERATIVE NOTE - NSICDXBRIEFPROCEDURE_GEN_ALL_CORE_FT
PROCEDURES:  CABG, with CATHLEEN 21-May-2024 10:34:31 LIMA to LAd, RSVG to Diag, Om and RPL Royce Riddle

## 2024-05-21 NOTE — PROCEDURE NOTE - ADDITIONAL PROCEDURE DETAILS
Under sterile technique, successfully placed 14fr silicone catheter into urinary bladder with positive output of clear yellow urine. Balloon was inflated with 10cc of sterile water and kim was secured with a statlock. Patient tolerated procedure well, no complications

## 2024-05-22 LAB
ALBUMIN SERPL ELPH-MCNC: 3.6 G/DL — SIGNIFICANT CHANGE UP (ref 3.5–5.2)
ALBUMIN SERPL ELPH-MCNC: 3.8 G/DL — SIGNIFICANT CHANGE UP (ref 3.5–5.2)
ALP SERPL-CCNC: 42 U/L — SIGNIFICANT CHANGE UP (ref 30–115)
ALP SERPL-CCNC: 42 U/L — SIGNIFICANT CHANGE UP (ref 30–115)
ALT FLD-CCNC: 16 U/L — SIGNIFICANT CHANGE UP (ref 0–41)
ALT FLD-CCNC: 23 U/L — SIGNIFICANT CHANGE UP (ref 0–41)
ANION GAP SERPL CALC-SCNC: 11 MMOL/L — SIGNIFICANT CHANGE UP (ref 7–14)
ANION GAP SERPL CALC-SCNC: 9 MMOL/L — SIGNIFICANT CHANGE UP (ref 7–14)
APTT BLD: 34.2 SEC — SIGNIFICANT CHANGE UP (ref 27–39.2)
AST SERPL-CCNC: 32 U/L — SIGNIFICANT CHANGE UP (ref 0–41)
AST SERPL-CCNC: 39 U/L — SIGNIFICANT CHANGE UP (ref 0–41)
BASE EXCESS BLDMV CALC-SCNC: 1.3 MMOL/L — SIGNIFICANT CHANGE UP
BASOPHILS # BLD AUTO: 0.01 K/UL — SIGNIFICANT CHANGE UP (ref 0–0.2)
BASOPHILS NFR BLD AUTO: 0.1 % — SIGNIFICANT CHANGE UP (ref 0–1)
BILIRUB SERPL-MCNC: 0.4 MG/DL — SIGNIFICANT CHANGE UP (ref 0.2–1.2)
BILIRUB SERPL-MCNC: 0.7 MG/DL — SIGNIFICANT CHANGE UP (ref 0.2–1.2)
BUN SERPL-MCNC: 16 MG/DL — SIGNIFICANT CHANGE UP (ref 10–20)
BUN SERPL-MCNC: 19 MG/DL — SIGNIFICANT CHANGE UP (ref 10–20)
CALCIUM SERPL-MCNC: 7.8 MG/DL — LOW (ref 8.4–10.5)
CALCIUM SERPL-MCNC: 8.2 MG/DL — LOW (ref 8.4–10.5)
CHLORIDE SERPL-SCNC: 105 MMOL/L — SIGNIFICANT CHANGE UP (ref 98–110)
CHLORIDE SERPL-SCNC: 106 MMOL/L — SIGNIFICANT CHANGE UP (ref 98–110)
CO2 SERPL-SCNC: 21 MMOL/L — SIGNIFICANT CHANGE UP (ref 17–32)
CO2 SERPL-SCNC: 24 MMOL/L — SIGNIFICANT CHANGE UP (ref 17–32)
CREAT SERPL-MCNC: 1 MG/DL — SIGNIFICANT CHANGE UP (ref 0.7–1.5)
CREAT SERPL-MCNC: 1.1 MG/DL — SIGNIFICANT CHANGE UP (ref 0.7–1.5)
EGFR: 75 ML/MIN/1.73M2 — SIGNIFICANT CHANGE UP
EGFR: 85 ML/MIN/1.73M2 — SIGNIFICANT CHANGE UP
EOSINOPHIL # BLD AUTO: 0 K/UL — SIGNIFICANT CHANGE UP (ref 0–0.7)
EOSINOPHIL NFR BLD AUTO: 0 % — SIGNIFICANT CHANGE UP (ref 0–8)
GAS PNL BLDA: SIGNIFICANT CHANGE UP
GAS PNL BLDMV: SIGNIFICANT CHANGE UP
GLUCOSE BLDC GLUCOMTR-MCNC: 109 MG/DL — HIGH (ref 70–99)
GLUCOSE BLDC GLUCOMTR-MCNC: 112 MG/DL — HIGH (ref 70–99)
GLUCOSE BLDC GLUCOMTR-MCNC: 113 MG/DL — HIGH (ref 70–99)
GLUCOSE BLDC GLUCOMTR-MCNC: 127 MG/DL — HIGH (ref 70–99)
GLUCOSE BLDC GLUCOMTR-MCNC: 128 MG/DL — HIGH (ref 70–99)
GLUCOSE BLDC GLUCOMTR-MCNC: 135 MG/DL — HIGH (ref 70–99)
GLUCOSE BLDC GLUCOMTR-MCNC: 156 MG/DL — HIGH (ref 70–99)
GLUCOSE SERPL-MCNC: 113 MG/DL — HIGH (ref 70–99)
GLUCOSE SERPL-MCNC: 131 MG/DL — HIGH (ref 70–99)
HCO3 BLDMV-SCNC: 26 MMOL/L — SIGNIFICANT CHANGE UP (ref 20–28)
HCT VFR BLD CALC: 35.1 % — LOW (ref 42–52)
HCT VFR BLD CALC: 36.7 % — LOW (ref 42–52)
HGB BLD-MCNC: 12.1 G/DL — LOW (ref 14–18)
HGB BLD-MCNC: 12.4 G/DL — LOW (ref 14–18)
HOROWITZ INDEX BLDMV+IHG-RTO: 70 — SIGNIFICANT CHANGE UP
IMM GRANULOCYTES NFR BLD AUTO: 0.6 % — HIGH (ref 0.1–0.3)
INR BLD: 1.1 RATIO — SIGNIFICANT CHANGE UP (ref 0.65–1.3)
LYMPHOCYTES # BLD AUTO: 0.5 K/UL — LOW (ref 1.2–3.4)
LYMPHOCYTES # BLD AUTO: 3.8 % — LOW (ref 20.5–51.1)
MAGNESIUM SERPL-MCNC: 2.4 MG/DL — SIGNIFICANT CHANGE UP (ref 1.8–2.4)
MCHC RBC-ENTMCNC: 28.5 PG — SIGNIFICANT CHANGE UP (ref 27–31)
MCHC RBC-ENTMCNC: 28.7 PG — SIGNIFICANT CHANGE UP (ref 27–31)
MCHC RBC-ENTMCNC: 33.8 G/DL — SIGNIFICANT CHANGE UP (ref 32–37)
MCHC RBC-ENTMCNC: 34.5 G/DL — SIGNIFICANT CHANGE UP (ref 32–37)
MCV RBC AUTO: 83.4 FL — SIGNIFICANT CHANGE UP (ref 80–94)
MCV RBC AUTO: 84.4 FL — SIGNIFICANT CHANGE UP (ref 80–94)
MONOCYTES # BLD AUTO: 1.01 K/UL — HIGH (ref 0.1–0.6)
MONOCYTES NFR BLD AUTO: 7.7 % — SIGNIFICANT CHANGE UP (ref 1.7–9.3)
NEUTROPHILS # BLD AUTO: 11.44 K/UL — HIGH (ref 1.4–6.5)
NEUTROPHILS NFR BLD AUTO: 87.8 % — HIGH (ref 42.2–75.2)
NRBC # BLD: 0 /100 WBCS — SIGNIFICANT CHANGE UP (ref 0–0)
NRBC # BLD: 0 /100 WBCS — SIGNIFICANT CHANGE UP (ref 0–0)
O2 CT VFR BLD CALC: 41 MMHG — SIGNIFICANT CHANGE UP (ref 30–65)
PCO2 BLDMV: 44 MMHG — SIGNIFICANT CHANGE UP (ref 30–65)
PH BLDMV: 7.39 — SIGNIFICANT CHANGE UP
PLATELET # BLD AUTO: 116 K/UL — LOW (ref 130–400)
PLATELET # BLD AUTO: 125 K/UL — LOW (ref 130–400)
PMV BLD: 10.3 FL — SIGNIFICANT CHANGE UP (ref 7.4–10.4)
PMV BLD: 10.5 FL — HIGH (ref 7.4–10.4)
POTASSIUM SERPL-MCNC: 4.1 MMOL/L — SIGNIFICANT CHANGE UP (ref 3.5–5)
POTASSIUM SERPL-MCNC: 4.1 MMOL/L — SIGNIFICANT CHANGE UP (ref 3.5–5)
POTASSIUM SERPL-SCNC: 4.1 MMOL/L — SIGNIFICANT CHANGE UP (ref 3.5–5)
POTASSIUM SERPL-SCNC: 4.1 MMOL/L — SIGNIFICANT CHANGE UP (ref 3.5–5)
PROT SERPL-MCNC: 5.1 G/DL — LOW (ref 6–8)
PROT SERPL-MCNC: 5.2 G/DL — LOW (ref 6–8)
PROTHROM AB SERPL-ACNC: 12.5 SEC — SIGNIFICANT CHANGE UP (ref 9.95–12.87)
RBC # BLD: 4.21 M/UL — LOW (ref 4.7–6.1)
RBC # BLD: 4.35 M/UL — LOW (ref 4.7–6.1)
RBC # FLD: 12.7 % — SIGNIFICANT CHANGE UP (ref 11.5–14.5)
RBC # FLD: 12.7 % — SIGNIFICANT CHANGE UP (ref 11.5–14.5)
SAO2 % BLDMV: 69 — SIGNIFICANT CHANGE UP (ref 60–90)
SODIUM SERPL-SCNC: 137 MMOL/L — SIGNIFICANT CHANGE UP (ref 135–146)
SODIUM SERPL-SCNC: 139 MMOL/L — SIGNIFICANT CHANGE UP (ref 135–146)
WBC # BLD: 12.86 K/UL — HIGH (ref 4.8–10.8)
WBC # BLD: 13.04 K/UL — HIGH (ref 4.8–10.8)
WBC # FLD AUTO: 12.86 K/UL — HIGH (ref 4.8–10.8)
WBC # FLD AUTO: 13.04 K/UL — HIGH (ref 4.8–10.8)

## 2024-05-22 PROCEDURE — 71045 X-RAY EXAM CHEST 1 VIEW: CPT | Mod: 26

## 2024-05-22 PROCEDURE — 99291 CRITICAL CARE FIRST HOUR: CPT

## 2024-05-22 PROCEDURE — 93010 ELECTROCARDIOGRAM REPORT: CPT

## 2024-05-22 RX ORDER — ASPIRIN/CALCIUM CARB/MAGNESIUM 324 MG
325 TABLET ORAL DAILY
Refills: 0 | Status: DISCONTINUED | OUTPATIENT
Start: 2024-05-22 | End: 2024-05-27

## 2024-05-22 RX ORDER — ATORVASTATIN CALCIUM 80 MG/1
40 TABLET, FILM COATED ORAL AT BEDTIME
Refills: 0 | Status: DISCONTINUED | OUTPATIENT
Start: 2024-05-22 | End: 2024-05-29

## 2024-05-22 RX ORDER — INSULIN LISPRO 100/ML
VIAL (ML) SUBCUTANEOUS AT BEDTIME
Refills: 0 | Status: DISCONTINUED | OUTPATIENT
Start: 2024-05-22 | End: 2024-05-25

## 2024-05-22 RX ORDER — GLUCAGON INJECTION, SOLUTION 0.5 MG/.1ML
1 INJECTION, SOLUTION SUBCUTANEOUS ONCE
Refills: 0 | Status: DISCONTINUED | OUTPATIENT
Start: 2024-05-22 | End: 2024-05-29

## 2024-05-22 RX ORDER — METOPROLOL TARTRATE 50 MG
12.5 TABLET ORAL EVERY 12 HOURS
Refills: 0 | Status: DISCONTINUED | OUTPATIENT
Start: 2024-05-22 | End: 2024-05-24

## 2024-05-22 RX ORDER — NOREPINEPHRINE BITARTRATE/D5W 8 MG/250ML
0.05 PLASTIC BAG, INJECTION (ML) INTRAVENOUS
Qty: 8 | Refills: 0 | Status: DISCONTINUED | OUTPATIENT
Start: 2024-05-22 | End: 2024-05-23

## 2024-05-22 RX ORDER — INSULIN LISPRO 100/ML
VIAL (ML) SUBCUTANEOUS
Refills: 0 | Status: DISCONTINUED | OUTPATIENT
Start: 2024-05-22 | End: 2024-05-25

## 2024-05-22 RX ORDER — DEXTROSE 50 % IN WATER 50 %
15 SYRINGE (ML) INTRAVENOUS ONCE
Refills: 0 | Status: DISCONTINUED | OUTPATIENT
Start: 2024-05-22 | End: 2024-05-25

## 2024-05-22 RX ORDER — SODIUM CHLORIDE 9 MG/ML
1000 INJECTION, SOLUTION INTRAVENOUS
Refills: 0 | Status: DISCONTINUED | OUTPATIENT
Start: 2024-05-22 | End: 2024-05-29

## 2024-05-22 RX ORDER — LANOLIN ALCOHOL/MO/W.PET/CERES
5 CREAM (GRAM) TOPICAL ONCE
Refills: 0 | Status: COMPLETED | OUTPATIENT
Start: 2024-05-22 | End: 2024-05-22

## 2024-05-22 RX ORDER — POTASSIUM CHLORIDE 20 MEQ
20 PACKET (EA) ORAL ONCE
Refills: 0 | Status: COMPLETED | OUTPATIENT
Start: 2024-05-22 | End: 2024-05-22

## 2024-05-22 RX ORDER — DEXTROSE 10 % IN WATER 10 %
125 INTRAVENOUS SOLUTION INTRAVENOUS ONCE
Refills: 0 | Status: DISCONTINUED | OUTPATIENT
Start: 2024-05-22 | End: 2024-05-29

## 2024-05-22 RX ORDER — HEPARIN SODIUM 5000 [USP'U]/ML
3000 INJECTION INTRAVENOUS; SUBCUTANEOUS EVERY 12 HOURS
Refills: 0 | Status: DISCONTINUED | OUTPATIENT
Start: 2024-05-22 | End: 2024-05-29

## 2024-05-22 RX ADMIN — ATORVASTATIN CALCIUM 40 MILLIGRAM(S): 80 TABLET, FILM COATED ORAL at 21:42

## 2024-05-22 RX ADMIN — Medication 12.5 MILLIGRAM(S): at 17:46

## 2024-05-22 RX ADMIN — Medication 1000 MILLIGRAM(S): at 00:29

## 2024-05-22 RX ADMIN — Medication 100 MILLIGRAM(S): at 14:47

## 2024-05-22 RX ADMIN — Medication 400 MILLIGRAM(S): at 05:43

## 2024-05-22 RX ADMIN — Medication 5 MILLIGRAM(S): at 21:43

## 2024-05-22 RX ADMIN — CHLORHEXIDINE GLUCONATE 1 APPLICATION(S): 213 SOLUTION TOPICAL at 21:45

## 2024-05-22 RX ADMIN — Medication 250 MILLIGRAM(S): at 17:49

## 2024-05-22 RX ADMIN — Medication 250 MILLIGRAM(S): at 06:41

## 2024-05-22 RX ADMIN — FAMOTIDINE 20 MILLIGRAM(S): 10 INJECTION INTRAVENOUS at 05:43

## 2024-05-22 RX ADMIN — OXYCODONE HYDROCHLORIDE 5 MILLIGRAM(S): 5 TABLET ORAL at 20:55

## 2024-05-22 RX ADMIN — Medication 1000 MILLIGRAM(S): at 12:00

## 2024-05-22 RX ADMIN — Medication 400 MILLIGRAM(S): at 11:06

## 2024-05-22 RX ADMIN — Medication 400 MILLIGRAM(S): at 17:45

## 2024-05-22 RX ADMIN — FAMOTIDINE 20 MILLIGRAM(S): 10 INJECTION INTRAVENOUS at 17:49

## 2024-05-22 RX ADMIN — SENNA PLUS 2 TABLET(S): 8.6 TABLET ORAL at 21:42

## 2024-05-22 RX ADMIN — Medication 1000 MILLIGRAM(S): at 06:43

## 2024-05-22 RX ADMIN — Medication 100 MILLIGRAM(S): at 05:43

## 2024-05-22 RX ADMIN — Medication 325 MILLIGRAM(S): at 11:05

## 2024-05-22 RX ADMIN — HEPARIN SODIUM 3000 UNIT(S): 5000 INJECTION INTRAVENOUS; SUBCUTANEOUS at 17:46

## 2024-05-22 RX ADMIN — Medication 1: at 16:43

## 2024-05-22 RX ADMIN — POLYETHYLENE GLYCOL 3350 17 GRAM(S): 17 POWDER, FOR SOLUTION ORAL at 11:06

## 2024-05-22 RX ADMIN — Medication 20 MILLIEQUIVALENT(S): at 21:42

## 2024-05-22 RX ADMIN — Medication 400 MILLIGRAM(S): at 23:33

## 2024-05-22 RX ADMIN — CHLORHEXIDINE GLUCONATE 15 MILLILITER(S): 213 SOLUTION TOPICAL at 05:43

## 2024-05-22 RX ADMIN — OXYCODONE HYDROCHLORIDE 5 MILLIGRAM(S): 5 TABLET ORAL at 21:55

## 2024-05-22 RX ADMIN — Medication 100 MILLIGRAM(S): at 21:42

## 2024-05-22 NOTE — PHYSICAL THERAPY INITIAL EVALUATION ADULT - TINETTI GAIT TEST, REHAB EVAL
As per Tinetti test, pt is a moderate risk for falls.
Based on Tinetti score, Pt at low risk of falling

## 2024-05-22 NOTE — PROGRESS NOTE ADULT - SUBJECTIVE AND OBJECTIVE BOX
CTU Attending Progress Daily Note     22 May 2024 06:24    Procedure:                                                  POD#                   Patient seen as post-op critical care follow-up    HPI:    63y Male with PMH of HTN, HLD, ? CKD (Cr 1.5 on 5/16/24), who presented to his cardiologist for a routine check up and found to have an abnormal ECG.  Denies any chest pain, dizziness, n/v, diaphoresis, orthopnea, PND, LE edema, palpitations, syncope.  Then, pt had a stress echo on 5/15/24 which was + for apical, inferior, inferoseptal wall ischemia, and EF 40% with exercise, 66% @ rest.  He only exercised for 3 mins and 40 secs, and the test was stopped due to SOB.    Pt is now referred for [facility] with possible intervention if clinically indicated.     Pre cath note:  indication:  [ ] STEMI                [ ] NSTEMI                 [ ] Acute coronary syndrome                   [ ]Unstable Angina   [ ] high risk  [ ] intermediate risk  [ ] low risk                   [ x] Stable Angina     non-invasive testing: stress echo            Date:   5/15/24         result: [ ] high risk  [x ] intermediate risk  [ ] low risk    Anti- Anginal medications:                    [ ] not used                       [x ] used                   [ ] not used but strong indication not to use  -on BB  -Amlodipine 2.5mg po x 1     Ejection Fraction                   [ ] <29            [ ] 30-39%   [ x] 40-49%     [ ]>50%    CHF                   [ ] active (within last 14 days on meds   [ ] Chronic (on meds but no exacerbation)    COPD                   [ ] mild (on chronic bronchodilators)  [ ] moderate (on chronic steroid therapy)      [ ] severe (indication for home O2 or PACO2 >50)    Other risk factors:                     [ ] Previous MI                     [ ] CVA/ stroke                    [ ] carotid stent/ CEA                    [ ] PVD/PAD- (arterial aneurysm, non-palpable pulses, tortuous vessel with inability to insert catheter, infra-renal dissection, renal or subclavian artery stenosis)                    [ ] diabetic                    [ ] previous CABG                    [ ] Renal Failure     Bleeding Risk: 1.2%    RIGHT RADIAL ARTERY EVALUATION:  AVIS TEST: [] Negative          [x] Positive    Fluids: 250cc NS bolus  (16 May 2024 19:20)    See preop testing chart H&P    Interval event for past 24 hr:  AVIVA HOFFMAN  63y had no event.     Current Complains:  AVIVA HOFFMAN has no new complaints    REVIEW OF SYSTEMS:  CONSTITUTIONAL:  [-] weakness, [-] fevers, [-] chills  EYES/ENT: [-] visual changes, [-] vertigo, [-] throat pain   NECK: [-] pain, [-] stiffness  RESPIRATORY: [-] cough, [-] wheezing, [-] hemoptysis, [-] shortness of breath  CARDIOVASCULAR: [-] chest pain, [-] palpitations, [-] orthopnea  GASTROINTESTINAL:    [-]abdominal pain, [-] nausea, [-] vomiting, [-] hematemesis, [-] diarrhea, [-] constipation, [-] melena, [-] hematochezia.  GENITOURINARY: [-] dysuria, [-] frequency, [-] hematuria  NEUROLOGICAL: [-] numbness, [-] weakness  SKIN: [-] itching, [-] burning, [-] rashes, [-] lesions   All other review of systems is negative unless indicated above.    [  ] Unable to assess ROS because :    OBJECTIVE:  ICU Vital Signs Last 24 Hrs  T(C): 35.6 (22 May 2024 05:20), Max: 37.1 (21 May 2024 19:15)  T(F): 96.1 (22 May 2024 05:20), Max: 98.8 (21 May 2024 19:15)  HR: 71 (22 May 2024 06:00) (63 - 91)  BP: 100/60 (22 May 2024 04:00) (91/61 - 116/70)  BP(mean): 76 (22 May 2024 04:00) (70 - 95)  ABP: 112/52 (22 May 2024 06:00) (79/41 - 127/48)  ABP(mean): 70 (22 May 2024 06:00) (55 - 94)  RR: 18 (22 May 2024 06:00) (11 - 29)  SpO2: 96% (22 May 2024 06:00) (92% - 100%)    O2 Parameters below as of 22 May 2024 06:00  Patient On (Oxygen Delivery Method): mask, aerosol    O2 Concentration (%): 100        I&O's Summary    20 May 2024 07:01  -  21 May 2024 07:00  --------------------------------------------------------  IN: 540 mL / OUT: 1900 mL / NET: -1360 mL    21 May 2024 07:01  -  22 May 2024 06:24  --------------------------------------------------------  IN: 1831.4 mL / OUT: 1295 mL / NET: 536.4 mL      Adult Advanced Hemodynamics Last 24 Hrs  CVP(mm Hg): 12 (22 May 2024 06:00) (6 - 244)  CVP(cm H2O): --  CO: 6.7 (22 May 2024 06:00) (3.1 - 7.5)  CI: 3.4 (22 May 2024 06:00) (1.5 - 3.8)  PA: 25/5 (22 May 2024 06:00) (25/5 - 40/26)  PA(mean): 13 (22 May 2024 06:00) (13 - 33)  PCWP: --  SVR: 691 (22 May 2024 06:00) (596 - 1134)  SVRI: 1363 (22 May 2024 06:00) (1177 - 2343)  PVR: --  PVRI: --  Mode: CPAP with PS  FiO2: 50  PEEP: 5  PS: 7  MAP: 8  PIP: 14      PHYSICAL EXAM:  General: WN/WD NAD    HEENT:     [+] NCAT  [+] EOMI  [-] Conjuctival edema   [-] Icterus   [-] Thrush   [-] ETT  [-] NGT/OGT    Neck:         [+] FROM   [-] JVD     [-] Nodes     [-] Masses    [+] Mid-line trachea    [-] Tracheostomy    Chest:         [-] Sternal click   [-] Sternal drainage   [+] Pacing wires   [+] Chest tubes   [-] SubQ emphysema    Lungs:          [+] CTA   [-] Rhonchi   [-] Rales    [-] Wheezing    [-] Decreased BS    [-] Dullness R L    Cardiac:       [+] S1 [+] S2    [+] RRR   [-] Irregular   [-] S3   [-] S4    [-] Murmurs    [-] Rub    Abdomen:    [+] BS    [+] Soft    [+] Non-tender     [-] Distended    [-] Organomegaly  [-] PEG    Extremities:   [-] Cyanosis U/L   [-] Clubbing  U/L  [-] LE/UE Edema   [+] Capillary refill    [+] Pulses     Neuro:        [+] Awake   [+]  Alert   [-] Confused   [-] Lethargic   [-] Sedated   [-] Generalized Weakness    Skin:        [-] Rashes    [-] Erythema   [+] Normal incisions   [+] IV sites intact   [-] Sacral decubitus    Tubes:  LINES:    CAPILLARY BLOOD GLUCOSE      POCT Blood Glucose.: 127 mg/dL (22 May 2024 05:55)    CAPILLARY BLOOD GLUCOSE      POCT Blood Glucose.: 127 mg/dL (22 May 2024 05:55)  POCT Blood Glucose.: 112 mg/dL (22 May 2024 01:04)  POCT Blood Glucose.: 120 mg/dL (21 May 2024 23:35)  POCT Blood Glucose.: 128 mg/dL (21 May 2024 21:58)  POCT Blood Glucose.: 135 mg/dL (21 May 2024 20:20)  POCT Blood Glucose.: 161 mg/dL (21 May 2024 18:12)  POCT Blood Glucose.: 137 mg/dL (21 May 2024 16:25)      HOSPITAL MEDICATIONS:  MEDICATIONS  (STANDING):  acetaminophen     Tablet .. 650 milliGRAM(s) Oral every 6 hours  acetaminophen   IVPB .. 1000 milliGRAM(s) IV Intermittent once  acetaminophen   IVPB .. 1000 milliGRAM(s) IV Intermittent once  acetaminophen   IVPB .. 1000 milliGRAM(s) IV Intermittent once  aMIOdarone Infusion 0.5 mG/Min (16.7 mL/Hr) IV Continuous <Continuous>  aspirin Suppository 300 milliGRAM(s) Rectal once  ceFAZolin   IVPB 1000 milliGRAM(s) IV Intermittent every 8 hours  chlorhexidine 0.12% Liquid 15 milliLiter(s) Oral Mucosa every 12 hours  chlorhexidine 0.12% Liquid 15 milliLiter(s) Oral Mucosa every 12 hours  chlorhexidine 2% Cloths 1 Application(s) Topical daily  dexMEDEtomidine Infusion 1 MICROgram(s)/kG/Hr (20.8 mL/Hr) IV Continuous <Continuous>  dextrose 50% Injectable 50 milliLiter(s) IV Push every 15 minutes  dextrose 50% Injectable 25 milliLiter(s) IV Push every 15 minutes  DOBUTamine Infusion 3 MICROgram(s)/kG/Min (7.47 mL/Hr) IV Continuous <Continuous>  famotidine Injectable 20 milliGRAM(s) IV Push every 12 hours  insulin regular Infusion 5 Unit(s)/Hr (5 mL/Hr) IV Continuous <Continuous>  meperidine     Injectable 25 milliGRAM(s) IV Push once  niCARdipine Infusion 5 mG/Hr (25 mL/Hr) IV Continuous <Continuous>  nitroglycerin  Infusion 15 MICROgram(s)/Min (4.5 mL/Hr) IV Continuous <Continuous>  norepinephrine Infusion 0.05 MICROgram(s)/kG/Min (7.78 mL/Hr) IV Continuous <Continuous>  polyethylene glycol 3350 17 Gram(s) Oral daily  propofol Infusion 15 MICROgram(s)/kG/Min (7.47 mL/Hr) IV Continuous <Continuous>  senna 2 Tablet(s) Oral at bedtime  sodium chloride 0.9%. 1000 milliLiter(s) (20 mL/Hr) IV Continuous <Continuous>  vancomycin  IVPB 1000 milliGRAM(s) IV Intermittent every 12 hours  vasopressin Infusion 0.04 Unit(s)/Min (6 mL/Hr) IV Continuous <Continuous>    MEDICATIONS  (PRN):  fentaNYL    Injectable 25 MICROGram(s) IV Push every 4 hours PRN Severe Pain (7 - 10)  HYDROmorphone  Injectable 0.5 milliGRAM(s) IV Push every 6 hours PRN Breakthrough Pain  ketorolac   Injectable 15 milliGRAM(s) IV Push every 4 hours PRN Severe Pain (7 - 10)  ondansetron Injectable 4 milliGRAM(s) IV Push every 4 hours PRN Nausea and/or Vomiting  oxyCODONE    IR 5 milliGRAM(s) Oral every 4 hours PRN Moderate Pain (4 - 6)  oxyCODONE    IR 10 milliGRAM(s) Oral every 4 hours PRN Severe Pain (7 - 10)      LABS:  ABG - ( 22 May 2024 05:28 )  pH, Arterial: 7.45  pH, Blood: x     /  pCO2: 34    /  pO2: 72    / HCO3: 24    / Base Excess: 0.1   /  SaO2: 96.3                                    12.4   13.04 )-----------( 125      ( 22 May 2024 01:25 )             36.7     05-22    139  |  106  |  19  ----------------------------<  113<H>  4.1   |  24  |  1.1    Ca    7.8<L>      22 May 2024 01:25  Mg     3.4     05-21    TPro  5.1<L>  /  Alb  3.8  /  TBili  0.4  /  DBili  x   /  AST  39  /  ALT  23  /  AlkPhos  42  05-22    PT/INR - ( 22 May 2024 01:25 )   PT: 12.50 sec;   INR: 1.10 ratio         PTT - ( 22 May 2024 01:25 )  PTT:34.2 sec  Urinalysis Basic - ( 22 May 2024 01:25 )    Color: x / Appearance: x / SG: x / pH: x  Gluc: 113 mg/dL / Ketone: x  / Bili: x / Urobili: x   Blood: x / Protein: x / Nitrite: x   Leuk Esterase: x / RBC: x / WBC x   Sq Epi: x / Non Sq Epi: x / Bacteria: x          RADIOLOGY:  Reviewed and interpreted by me  CXR from 05-22-24 shows [+] mild congestion, [-] pneumothorax, [-] R/L effusion, [-] cardiomegaly,   NGT in place, S-G Catheter in place, R/L TLC in place, R/L Chest Tubes in place    ECG:  Reviewed and interpreted by me:   QTC:    Assessment:      PAST MEDICAL & SURGICAL HISTORY:  HTN (hypertension)      Hyperlipidemia      Chronic kidney disease (CKD)      No significant past surgical history          PLAN:  Neuro: Pain control  Pulm: Encourage coughing, deep breathing and use of incentive spirometry. Wean off supplemental oxygen as able. Daily CXR.   Cardio: Monitor telemetry/alarms. Continue cardiac meds  GI: Tolerating diet. Continue stool softeners. Continue GI prophylaxis  Renal: monitor urine output, supplement electrolytes as needed  Vasc: Heparin SC/SCDs for DVT prophylaxis  Heme: Monitor H/H.   ID: Off antibiotics. Stable.  Endocrine: Monitor finger stick blood sugar and control hyperglycemia with insulin  Physical Therapy: OOB/ambulate  Tubes: Monitor Chest tube output      Discussed with Cardiothoracic Team at AM rounds.    45 minutes of critical care time spent providing medical care for patient's acute illness/conditions that impairs at least one vital organ system and/or poses a high risk of imminent or life threatening deterioration in the patient's condition. It includes time spent evaluating and treating the patient's acute illness as well as time spent reviewing labs, radiology, discussing goals of care with patient and/or patient's family, and discussing the case with a multidisciplinary team in an effort to prevent further life threatening deterioration or end organ damage. This time is independent of any procedures performed. CTU Attending Progress Daily Note     22 May 2024 06:24    Procedure:               CABG 3 L                                   POD#           1        Patient seen as post-op critical care follow-up    HPI:    63y Male with PMH of HTN, HLD, ? CKD (Cr 1.5 on 5/16/24), who presented to his cardiologist for a routine check up and found to have an abnormal ECG.  Denies any chest pain, dizziness, n/v, diaphoresis, orthopnea, PND, LE edema, palpitations, syncope.  Then, pt had a stress echo on 5/15/24 which was + for apical, inferior, inferoseptal wall ischemia, and EF 40% with exercise, 66% @ rest.  He only exercised for 3 mins and 40 secs, and the test was stopped due to SOB.    Pt is now referred for [facility] with possible intervention if clinically indicated.       (16 May 2024 19:20)      Interval event for past 24 hr:  AVIVA HOFFMAN  63y had CABG, remains on dobutamine 3      Current Complains:  AVIVA HOFFMAN has no new complaints    REVIEW OF SYSTEMS:  CONSTITUTIONAL:  [-] weakness, [-] fevers, [-] chills  EYES/ENT: [-] visual changes, [-] vertigo, [-] throat pain   NECK: [-] pain, [-] stiffness  RESPIRATORY: [-] cough, [-] wheezing, [-] hemoptysis, [-] shortness of breath  CARDIOVASCULAR: [-] chest pain, [-] palpitations, [-] orthopnea  GASTROINTESTINAL:    [-]abdominal pain, [-] nausea, [-] vomiting, [-] hematemesis, [-] diarrhea, [-] constipation, [-] melena, [-] hematochezia.  GENITOURINARY: [-] dysuria, [-] frequency, [-] hematuria  NEUROLOGICAL: [-] numbness, [-] weakness  SKIN: [-] itching, [-] burning, [-] rashes, [-] lesions   All other review of systems is negative unless indicated above.    [  ] Unable to assess ROS because :    OBJECTIVE:  ICU Vital Signs Last 24 Hrs  T(C): 35.6 (22 May 2024 05:20), Max: 37.1 (21 May 2024 19:15)  T(F): 96.1 (22 May 2024 05:20), Max: 98.8 (21 May 2024 19:15)  HR: 71 (22 May 2024 06:00) (63 - 91)  BP: 100/60 (22 May 2024 04:00) (91/61 - 116/70)  BP(mean): 76 (22 May 2024 04:00) (70 - 95)  ABP: 112/52 (22 May 2024 06:00) (79/41 - 127/48)  ABP(mean): 70 (22 May 2024 06:00) (55 - 94)  RR: 18 (22 May 2024 06:00) (11 - 29)  SpO2: 96% (22 May 2024 06:00) (92% - 100%)    O2 Parameters below as of 22 May 2024 06:00  Patient On (Oxygen Delivery Method): mask, aerosol    O2 Concentration (%): 100        I&O's Summary    20 May 2024 07:01  -  21 May 2024 07:00  --------------------------------------------------------  IN: 540 mL / OUT: 1900 mL / NET: -1360 mL    21 May 2024 07:01  -  22 May 2024 06:24  --------------------------------------------------------  IN: 1831.4 mL / OUT: 1295 mL / NET: 536.4 mL      Adult Advanced Hemodynamics Last 24 Hrs  CVP(mm Hg): 12 (22 May 2024 06:00) (6 - 244)  CVP(cm H2O): --  CO: 6.7 (22 May 2024 06:00) (3.1 - 7.5)  CI: 3.4 (22 May 2024 06:00) (1.5 - 3.8)  PA: 25/5 (22 May 2024 06:00) (25/5 - 40/26)  PA(mean): 13 (22 May 2024 06:00) (13 - 33)  PCWP: --  SVR: 691 (22 May 2024 06:00) (596 - 1134)  SVRI: 1363 (22 May 2024 06:00) (1177 - 2343)  PVR: --  PVRI: --  Mode: CPAP with PS  FiO2: 50  PEEP: 5  PS: 7  MAP: 8  PIP: 14      PHYSICAL EXAM:  General: WN/WD NAD    HEENT:     [+] NCAT  [+] EOMI  [-] Conjuctival edema   [-] Icterus   [-] Thrush   [-] ETT  [-] NGT/OGT    Neck:         [+] FROM   [-] JVD     [-] Nodes     [-] Masses    [+] Mid-line trachea    [-] Tracheostomy    Chest:         [-] Sternal click   [-] Sternal drainage   [+] Pacing wires   [+] Chest tubes   [-] SubQ emphysema    Lungs:          [+] CTA   [-] Rhonchi   [-] Rales    [-] Wheezing    [-] Decreased BS    [-] Dullness R L    Cardiac:       [+] S1 [+] S2    [+] RRR   [-] Irregular   [-] S3   [-] S4    [-] Murmurs    [-] Rub    Abdomen:    [+] BS    [+] Soft    [+] Non-tender     [-] Distended    [-] Organomegaly  [-] PEG    Extremities:   [-] Cyanosis U/L   [-] Clubbing  U/L  [-] LE/UE Edema   [+] Capillary refill    [+] Pulses     Neuro:        [+] Awake   [+]  Alert   [-] Confused   [-] Lethargic   [-] Sedated   [-] Generalized Weakness    Skin:        [-] Rashes    [-] Erythema   [+] Normal incisions   [+] IV sites intact   [-] Sacral decubitus    Tubes: Chest  LINES: Central    CAPILLARY BLOOD GLUCOSE      POCT Blood Glucose.: 127 mg/dL (22 May 2024 05:55)    CAPILLARY BLOOD GLUCOSE      POCT Blood Glucose.: 127 mg/dL (22 May 2024 05:55)  POCT Blood Glucose.: 112 mg/dL (22 May 2024 01:04)  POCT Blood Glucose.: 120 mg/dL (21 May 2024 23:35)  POCT Blood Glucose.: 128 mg/dL (21 May 2024 21:58)  POCT Blood Glucose.: 135 mg/dL (21 May 2024 20:20)  POCT Blood Glucose.: 161 mg/dL (21 May 2024 18:12)  POCT Blood Glucose.: 137 mg/dL (21 May 2024 16:25)      HOSPITAL MEDICATIONS:  MEDICATIONS  (STANDING):  acetaminophen     Tablet .. 650 milliGRAM(s) Oral every 6 hours  acetaminophen   IVPB .. 1000 milliGRAM(s) IV Intermittent once  acetaminophen   IVPB .. 1000 milliGRAM(s) IV Intermittent once  acetaminophen   IVPB .. 1000 milliGRAM(s) IV Intermittent once  aMIOdarone Infusion 0.5 mG/Min (16.7 mL/Hr) IV Continuous <Continuous>  aspirin Suppository 300 milliGRAM(s) Rectal once  ceFAZolin   IVPB 1000 milliGRAM(s) IV Intermittent every 8 hours  chlorhexidine 0.12% Liquid 15 milliLiter(s) Oral Mucosa every 12 hours  chlorhexidine 0.12% Liquid 15 milliLiter(s) Oral Mucosa every 12 hours  chlorhexidine 2% Cloths 1 Application(s) Topical daily  dexMEDEtomidine Infusion 1 MICROgram(s)/kG/Hr (20.8 mL/Hr) IV Continuous <Continuous>  dextrose 50% Injectable 50 milliLiter(s) IV Push every 15 minutes  dextrose 50% Injectable 25 milliLiter(s) IV Push every 15 minutes  DOBUTamine Infusion 3 MICROgram(s)/kG/Min (7.47 mL/Hr) IV Continuous <Continuous>  famotidine Injectable 20 milliGRAM(s) IV Push every 12 hours  insulin regular Infusion 5 Unit(s)/Hr (5 mL/Hr) IV Continuous <Continuous>  meperidine     Injectable 25 milliGRAM(s) IV Push once  niCARdipine Infusion 5 mG/Hr (25 mL/Hr) IV Continuous <Continuous>  nitroglycerin  Infusion 15 MICROgram(s)/Min (4.5 mL/Hr) IV Continuous <Continuous>  norepinephrine Infusion 0.05 MICROgram(s)/kG/Min (7.78 mL/Hr) IV Continuous <Continuous>  polyethylene glycol 3350 17 Gram(s) Oral daily  propofol Infusion 15 MICROgram(s)/kG/Min (7.47 mL/Hr) IV Continuous <Continuous>  senna 2 Tablet(s) Oral at bedtime  sodium chloride 0.9%. 1000 milliLiter(s) (20 mL/Hr) IV Continuous <Continuous>  vancomycin  IVPB 1000 milliGRAM(s) IV Intermittent every 12 hours  vasopressin Infusion 0.04 Unit(s)/Min (6 mL/Hr) IV Continuous <Continuous>    MEDICATIONS  (PRN):  fentaNYL    Injectable 25 MICROGram(s) IV Push every 4 hours PRN Severe Pain (7 - 10)  HYDROmorphone  Injectable 0.5 milliGRAM(s) IV Push every 6 hours PRN Breakthrough Pain  ketorolac   Injectable 15 milliGRAM(s) IV Push every 4 hours PRN Severe Pain (7 - 10)  ondansetron Injectable 4 milliGRAM(s) IV Push every 4 hours PRN Nausea and/or Vomiting  oxyCODONE    IR 5 milliGRAM(s) Oral every 4 hours PRN Moderate Pain (4 - 6)  oxyCODONE    IR 10 milliGRAM(s) Oral every 4 hours PRN Severe Pain (7 - 10)      LABS:  ABG - ( 22 May 2024 05:28 )  pH, Arterial: 7.45  pH, Blood: x     /  pCO2: 34    /  pO2: 72    / HCO3: 24    / Base Excess: 0.1   /  SaO2: 96.3                                    12.4   13.04 )-----------( 125      ( 22 May 2024 01:25 )             36.7     05-22    139  |  106  |  19  ----------------------------<  113<H>  4.1   |  24  |  1.1    Ca    7.8<L>      22 May 2024 01:25  Mg     3.4     05-21    TPro  5.1<L>  /  Alb  3.8  /  TBili  0.4  /  DBili  x   /  AST  39  /  ALT  23  /  AlkPhos  42  05-22    PT/INR - ( 22 May 2024 01:25 )   PT: 12.50 sec;   INR: 1.10 ratio         PTT - ( 22 May 2024 01:25 )  PTT:34.2 sec  Urinalysis Basic - ( 22 May 2024 01:25 )    Color: x / Appearance: x / SG: x / pH: x  Gluc: 113 mg/dL / Ketone: x  / Bili: x / Urobili: x   Blood: x / Protein: x / Nitrite: x   Leuk Esterase: x / RBC: x / WBC x   Sq Epi: x / Non Sq Epi: x / Bacteria: x          RADIOLOGY:  Reviewed and interpreted by me  CXR from 05-22-24 shows [+] mild congestion, [-] pneumothorax, [-] R/L effusion, [-] cardiomegaly,   S-G Catheter in place, R/L TLC in place, R/L Chest Tubes in place    ECG:  Reviewed and interpreted by me:   QTC: WNL    Assessment:  CAD SP CABG    PAST MEDICAL & SURGICAL HISTORY:  HTN (hypertension)      Hyperlipidemia      Chronic kidney disease (CKD)      No significant past surgical history          PLAN:  Neuro: Pain control  Pulm: Encourage coughing, deep breathing and use of incentive spirometry. Wean off supplemental oxygen as able. Daily CXR.   Cardio: Monitor telemetry/alarms. wean dobutamine to off, DC [place], lopressor for HR control/ Afib prophylaxis  GI: Oral diet. stool softeners. GI prophylaxis  Renal: monitor urine output, supplement electrolytes as needed  Vasc: Heparin SC/SCDs for DVT prophylaxis  Heme: Monitor H/H.   ID: post-op prophylactic antibiotics. Trend WBC and temp.  Endocrine: Monitor finger stick blood sugar and control hyperglycemia with insulin, Lipitor  Physical Therapy: OOB/ambulate  Tubes: Monitor Chest tube output      Discussed with Cardiothoracic Team at AM rounds.    45 minutes of critical care time spent providing medical care for patient's acute illness/conditions that impairs at least one vital organ system and/or poses a high risk of imminent or life threatening deterioration in the patient's condition. It includes time spent evaluating and treating the patient's acute illness as well as time spent reviewing labs, radiology, discussing goals of care with patient and/or patient's family, and discussing the case with a multidisciplinary team in an effort to prevent further life threatening deterioration or end organ damage. This time is independent of any procedures performed.

## 2024-05-22 NOTE — PHYSICAL THERAPY INITIAL EVALUATION ADULT - GAIT TRAINING, PT EVAL
by discharge: Pt will amb 400 ft independently using RW as needed and negotiate 10 steps with supervision using handrail.

## 2024-05-22 NOTE — PHYSICAL THERAPY INITIAL EVALUATION ADULT - PERTINENT HX OF CURRENT PROBLEM, REHAB EVAL
Pt presents for b/s PT re-evaluation, now s/p CABG x 3.
63y Male with PMH of HTN, HLD, ? CKD (Cr 1.5 on 5/16/24), who presented to his cardiologist for a routine check up and found to have an abnormal ECG.  Denies any chest pain, dizziness, n/v, diaphoresis, orthopnea, PND, LE edema, palpitations, syncope.  Then, pt had a stress echo on 5/15/24 which was + for apical, inferior, inferoseptal wall ischemia, and EF 40% with exercise, 66% @ rest.  He only exercised for 3 mins and 40 secs, and the test was stopped due to SOB.

## 2024-05-22 NOTE — PROGRESS NOTE ADULT - SUBJECTIVE AND OBJECTIVE BOX
OPERATIVE PROCEDURE(s):                POD #                       SURGEON(s): MIGUEL Kumar        SUBJECTIVE ASSESSMENT:63yMale patient seen and examined at bedside.    Vital Signs Last 24 Hrs  T(F): 97.7 (22 May 2024 08:00), Max: 98.8 (21 May 2024 19:15)  HR: 74 (22 May 2024 08:00) (63 - 91)  BP: 100/60 (22 May 2024 04:00) (91/61 - 116/70)  BP(mean): 76 (22 May 2024 04:00) (70 - 95)  ABP: 120/47 (22 May 2024 08:00) (79/41 - 127/48)  ABP(mean): 68 (22 May 2024 08:00)  RR: 23 (22 May 2024 08:00) (11 - 29)  SpO2: 98% (22 May 2024 08:00) (92% - 100%)  CVP(mm Hg): -3 (22 May 2024 08:00)  CVP(cm H2O): --  CO: 5.7 (22 May 2024 08:00)  CI: 2.8 (22 May 2024 08:00)  PA: 24/2 (22 May 2024 08:00)  SVR: 995 (22 May 2024 08:00)  Mode: CPAP with PS  FiO2: 50  PEEP: 5  PS: 7  MAP: 8    I&O's Detail    21 May 2024 07:01  -  22 May 2024 07:00  --------------------------------------------------------  IN:    Albumin 5%  - 500 mL: 500 mL    Amiodarone: 137.6 mL    Dexmedetomidine: 21.4 mL    DOBUTamine: 90 mL    Insulin: 22 mL    IV PiggyBack: 950 mL    NiCARdipine: 14 mL    Nitroglycerin: 186.5 mL    Norepinephrine: 10.9 mL    sodium chloride 0.9%: 300 mL  Total IN: 2232.4 mL    OUT:    Chest Tube (mL): 185 mL    Chest Tube (mL): 210 mL    Indwelling Catheter - Urethral (mL): 1015 mL  Total OUT: 1410 mL        Net: I&O's Detail    20 May 2024 07:01  -  21 May 2024 07:00  --------------------------------------------------------  Total NET: -1360 mL      21 May 2024 07:01  -  22 May 2024 07:00  --------------------------------------------------------  Total NET: 822.4 mL        CAPILLARY BLOOD GLUCOSE      POCT Blood Glucose.: 113 mg/dL (22 May 2024 08:26)  POCT Blood Glucose.: 127 mg/dL (22 May 2024 05:55)  POCT Blood Glucose.: 112 mg/dL (22 May 2024 01:04)  POCT Blood Glucose.: 120 mg/dL (21 May 2024 23:35)  POCT Blood Glucose.: 128 mg/dL (21 May 2024 21:58)  POCT Blood Glucose.: 135 mg/dL (21 May 2024 20:20)  POCT Blood Glucose.: 161 mg/dL (21 May 2024 18:12)  POCT Blood Glucose.: 137 mg/dL (21 May 2024 16:25)    A1C with Estimated Average Glucose Result: 6.0 % (05-18-24 @ 08:08)      Physical Exam:  General: NAD; A&Ox3  Neuro: pupils equal and reactive, speech clear, no overt sensory or motor deficts  Cardiac: S1/S2, RRR, no murmur, no rubs  Lungs: unlabored respirations, CTA b/l, no wheeze, no rales, no crackles  Abdomen: Soft/NT/ND; positive bowel sounds x 4  Sternum: Intact, no click, incision healing well with no drainage  Incisions: Incisions clean/dry/intact  Extremities: No edema b/l lower extremities; good capillary refill; no cyanosis; palpable 1+ pedal pulses b/l    Central Venous Catheter: Yes: critical illness, intravenous access  Day #  Zafar Catheter: Yes: critical illness; monitor strict i/o's                    Day #  EPICARDIAL WIRES:  YES                                                                         Day #  BOWEL MOVEMENT:  [] YES [] NO, If No, Timing since last BM Day #  CHEST TUBE(MS/Left/Right):  [] YES [] NO, If yes -  AIR LEAKS:  YES/NO        LABS:                        12.4<L>  13.04<H> )-----------( 125<L>    ( 22 May 2024 01:25 )             36.7<L>                        13.8<L>  19.06<H> )-----------( 135      ( 21 May 2024 16:34 )             39.8<L>    05-22    139  |  106  |  19  ----------------------------<  113<H>  4.1   |  24  |  1.1  05-21    139  |  106  |  21<H>  ----------------------------<  142<H>  4.2   |  23  |  1.1    Ca    7.8<L>      22 May 2024 01:25  Mg     3.4     05-21    TPro  5.1<L> [6.0 - 8.0]  /  Alb  3.8 [3.5 - 5.2]  /  TBili  0.4 [0.2 - 1.2]  /  DBili  x   /  AST  39 [0 - 41]  /  ALT  23 [0 - 41]  /  AlkPhos  42 [30 - 115]  05-22    PT/INR - ( 22 May 2024 01:25 )   PT: ;   INR: 1.10 ratio         PT/INR - ( 21 May 2024 16:34 )   PT: ;   INR: 1.08 ratio         PTT - ( 22 May 2024 01:25 )  PTT:34.2 sec, PTT - ( 21 May 2024 16:34 )  PTT:30.2 sec  Urinalysis Basic - ( 22 May 2024 01:25 )    Color: x / Appearance: x / SG: x / pH: x  Gluc: 113 mg/dL / Ketone: x  / Bili: x / Urobili: x   Blood: x / Protein: x / Nitrite: x   Leuk Esterase: x / RBC: x / WBC x   Sq Epi: x / Non Sq Epi: x / Bacteria: x      ABG - ( 22 May 2024 05:28 )  pH: 7.45  /  pCO2: 34    /  pO2: 72    / HCO3: 24    / Base Excess: 0.1   /  SaO2: 96.3  /  LA: 1.0              RADIOLOGY & ADDITIONAL TESTS:  CXR:   EKG:    Allergies    No Known Allergies    Intolerances      MEDICATIONS  (STANDING):  acetaminophen     Tablet .. 650 milliGRAM(s) Oral every 6 hours  acetaminophen   IVPB .. 1000 milliGRAM(s) IV Intermittent once  acetaminophen   IVPB .. 1000 milliGRAM(s) IV Intermittent once  acetaminophen   IVPB .. 1000 milliGRAM(s) IV Intermittent once  aMIOdarone Infusion 0.5 mG/Min (16.7 mL/Hr) IV Continuous <Continuous>  aspirin enteric coated 325 milliGRAM(s) Oral daily  aspirin Suppository 300 milliGRAM(s) Rectal once  ceFAZolin   IVPB 1000 milliGRAM(s) IV Intermittent every 8 hours  chlorhexidine 2% Cloths 1 Application(s) Topical daily  dextrose 50% Injectable 50 milliLiter(s) IV Push every 15 minutes  dextrose 50% Injectable 25 milliLiter(s) IV Push every 15 minutes  DOBUTamine Infusion 3 MICROgram(s)/kG/Min (7.47 mL/Hr) IV Continuous <Continuous>  famotidine Injectable 20 milliGRAM(s) IV Push every 12 hours  insulin regular Infusion 5 Unit(s)/Hr (5 mL/Hr) IV Continuous <Continuous>  meperidine     Injectable 25 milliGRAM(s) IV Push once  niCARdipine Infusion 5 mG/Hr (25 mL/Hr) IV Continuous <Continuous>  nitroglycerin  Infusion 15 MICROgram(s)/Min (4.5 mL/Hr) IV Continuous <Continuous>  norepinephrine Infusion 0.05 MICROgram(s)/kG/Min (7.78 mL/Hr) IV Continuous <Continuous>  polyethylene glycol 3350 17 Gram(s) Oral daily  propofol Infusion 15 MICROgram(s)/kG/Min (7.47 mL/Hr) IV Continuous <Continuous>  senna 2 Tablet(s) Oral at bedtime  sodium chloride 0.9%. 1000 milliLiter(s) (20 mL/Hr) IV Continuous <Continuous>  vancomycin  IVPB 1000 milliGRAM(s) IV Intermittent every 12 hours  vasopressin Infusion 0.04 Unit(s)/Min (6 mL/Hr) IV Continuous <Continuous>    MEDICATIONS  (PRN):  fentaNYL    Injectable 25 MICROGram(s) IV Push every 4 hours PRN Severe Pain (7 - 10)  HYDROmorphone  Injectable 0.5 milliGRAM(s) IV Push every 6 hours PRN Breakthrough Pain  ketorolac   Injectable 15 milliGRAM(s) IV Push every 4 hours PRN Severe Pain (7 - 10)  ondansetron Injectable 4 milliGRAM(s) IV Push every 4 hours PRN Nausea and/or Vomiting  oxyCODONE    IR 5 milliGRAM(s) Oral every 4 hours PRN Moderate Pain (4 - 6)  oxyCODONE    IR 10 milliGRAM(s) Oral every 4 hours PRN Severe Pain (7 - 10)    Home Medications:  Aspir 81 oral delayed release tablet: 1 tab(s) orally once a day (17 May 2024 14:10)  Lipitor 20 mg oral tablet: 1 tab(s) orally once a day (17 May 2024 14:10)  losartan 50 mg oral tablet: 1 tab(s) orally once a day (17 May 2024 14:10)  Metoprolol Tartrate 25 mg oral tablet: 1 tab(s) orally 2 times a day (17 May 2024 14:10)      Pharmacologic DVT Prophylaxis [] YES, [] NO: Contraindication:  SCD's: YES b/l    GI Prophylaxis:     Post-Op Beta-Blockers: [] Yes, [] No: contraindication:  Post-Op CCB: [] Yes, [] No: contraindication:  Post-Op Aspirin:  [] Yes, [] No: contraindication:  Post-Op Statin:  [] Yes, [] No: contraindication:    Ambulation/Activity Status:    Assessment/Plan:  63y Male status-post  - Case and plan discussed with CTU Intensivist and CT Surgeon - Dr. Rodriguez/Tamika/José/Raghav  - Continue CTU supportive care and ongoing plan of care as per continuing CTU rounds.   - Continue DVT/GI prophylaxis  - Incentive Spirometry 10 times an hour  - Continue to advance physical activity as tolerated and continue PT/OT as directed  1. CAD s/p CABG: Continue ASA, statin, BB  2. HTN:   3. Post-op A. Fib ppx:   4. COPD/Hypoxia:   5. DM/Glucose Control:     Social Service Disposition:     OPERATIVE PROCEDURE(s):  CABGx3              POD # 1                      SURGEON(s): MIGUEL Kumar        SUBJECTIVE ASSESSMENT:63yMale patient seen and examined at bedside. no acute complaints at this time.    Vital Signs Last 24 Hrs  T(F): 97.7 (22 May 2024 08:00), Max: 98.8 (21 May 2024 19:15)  HR: 74 (22 May 2024 08:00) (63 - 91)  BP: 100/60 (22 May 2024 04:00) (91/61 - 116/70)  BP(mean): 76 (22 May 2024 04:00) (70 - 95)  ABP: 120/47 (22 May 2024 08:00) (79/41 - 127/48)  ABP(mean): 68 (22 May 2024 08:00)  RR: 23 (22 May 2024 08:00) (11 - 29)  SpO2: 98% (22 May 2024 08:00) (92% - 100%)  CVP(mm Hg): -3 (22 May 2024 08:00)  CVP(cm H2O): --  CO: 5.7 (22 May 2024 08:00)  CI: 2.8 (22 May 2024 08:00)  PA: 24/2 (22 May 2024 08:00)  SVR: 995 (22 May 2024 08:00)  Mode: CPAP with PS  FiO2: 50  PEEP: 5  PS: 7  MAP: 8    I&O's Detail    21 May 2024 07:01  -  22 May 2024 07:00  --------------------------------------------------------  IN:    Albumin 5%  - 500 mL: 500 mL    Amiodarone: 137.6 mL    Dexmedetomidine: 21.4 mL    DOBUTamine: 90 mL    Insulin: 22 mL    IV PiggyBack: 950 mL    NiCARdipine: 14 mL    Nitroglycerin: 186.5 mL    Norepinephrine: 10.9 mL    sodium chloride 0.9%: 300 mL  Total IN: 2232.4 mL    OUT:    Chest Tube (mL): 185 mL    Chest Tube (mL): 210 mL    Indwelling Catheter - Urethral (mL): 1015 mL  Total OUT: 1410 mL        Net: I&O's Detail    20 May 2024 07:01  -  21 May 2024 07:00  --------------------------------------------------------  Total NET: -1360 mL      21 May 2024 07:01  -  22 May 2024 07:00  --------------------------------------------------------  Total NET: 822.4 mL        CAPILLARY BLOOD GLUCOSE      POCT Blood Glucose.: 113 mg/dL (22 May 2024 08:26)  POCT Blood Glucose.: 127 mg/dL (22 May 2024 05:55)  POCT Blood Glucose.: 112 mg/dL (22 May 2024 01:04)  POCT Blood Glucose.: 120 mg/dL (21 May 2024 23:35)  POCT Blood Glucose.: 128 mg/dL (21 May 2024 21:58)  POCT Blood Glucose.: 135 mg/dL (21 May 2024 20:20)  POCT Blood Glucose.: 161 mg/dL (21 May 2024 18:12)  POCT Blood Glucose.: 137 mg/dL (21 May 2024 16:25)    A1C with Estimated Average Glucose Result: 6.0 % (05-18-24 @ 08:08)      Physical Exam:  General: NAD; A&Ox3, no focal deficits, clear speech   Neuro: pupils equal and reactive, speech clear, no overt sensory or motor deficts  Cardiac: S1/S2, RRR, no murmur, no rubs  Lungs: unlabored respirations, CTA b/l, no wheeze, no rales, no crackles  Abdomen: Soft/NT/ND; positive bowel sounds x 4  Sternum: Intact, no click, incision healing well with no drainage  Incisions: Incisions clean/dry/intact  Extremities: No edema b/l lower extremities; good capillary refill; no cyanosis; palpable 1+ pedal pulses b/l    Central Venous Catheter: Yes: critical illness, intravenous access  Day #1  Zafar Catheter: Yes: critical illness; monitor strict i/o's                    Day #1  EPICARDIAL WIRES:  YES                                                                         Day #1  BOWEL MOVEMENT:  [] YES [x] NO, If No, Timing since last BM Day #  CHEST TUBE(MS/Left/Right):  [x] YES [] NO, If yes -  AIR LEAKS:  YES/NO        LABS:                        12.4<L>  13.04<H> )-----------( 125<L>    ( 22 May 2024 01:25 )             36.7<L>                        13.8<L>  19.06<H> )-----------( 135      ( 21 May 2024 16:34 )             39.8<L>    05-22    139  |  106  |  19  ----------------------------<  113<H>  4.1   |  24  |  1.1  05-21    139  |  106  |  21<H>  ----------------------------<  142<H>  4.2   |  23  |  1.1    Ca    7.8<L>      22 May 2024 01:25  Mg     3.4     05-21    TPro  5.1<L> [6.0 - 8.0]  /  Alb  3.8 [3.5 - 5.2]  /  TBili  0.4 [0.2 - 1.2]  /  DBili  x   /  AST  39 [0 - 41]  /  ALT  23 [0 - 41]  /  AlkPhos  42 [30 - 115]  05-22    PT/INR - ( 22 May 2024 01:25 )   PT: ;   INR: 1.10 ratio         PT/INR - ( 21 May 2024 16:34 )   PT: ;   INR: 1.08 ratio         PTT - ( 22 May 2024 01:25 )  PTT:34.2 sec, PTT - ( 21 May 2024 16:34 )  PTT:30.2 sec  Urinalysis Basic - ( 22 May 2024 01:25 )    Color: x / Appearance: x / SG: x / pH: x  Gluc: 113 mg/dL / Ketone: x  / Bili: x / Urobili: x   Blood: x / Protein: x / Nitrite: x   Leuk Esterase: x / RBC: x / WBC x   Sq Epi: x / Non Sq Epi: x / Bacteria: x      ABG - ( 22 May 2024 05:28 )  pH: 7.45  /  pCO2: 34    /  pO2: 72    / HCO3: 24    / Base Excess: 0.1   /  SaO2: 96.3  /  LA: 1.0              RADIOLOGY & ADDITIONAL TESTS:  CXR:  < from: Xray Chest 1 View- PORTABLE-Routine (05.22.24 @ 04:41) >    IMPRESSION:    Mild left basilar opacity/atelectasis. No pneumothorax.    < end of copied text >    EKG: < from: 12 Lead ECG (05.21.24 @ 17:02) >    Ventricular Rate 65 BPM    Atrial Rate 65 BPM    P-R Interval 160 ms    QRS Duration 100 ms    Q-T Interval 408 ms    QTC Calculation(Bazett) 424 ms    P Axis 59 degrees    R Axis 56 degrees    T Axis -40 degrees    Diagnosis Line Normal sinus rhythm  Possible Inferior infarct , age undetermined  Abnormal ECG    < end of copied text >      Allergies    No Known Allergies    Intolerances      MEDICATIONS  (STANDING):  acetaminophen     Tablet .. 650 milliGRAM(s) Oral every 6 hours  acetaminophen   IVPB .. 1000 milliGRAM(s) IV Intermittent once  acetaminophen   IVPB .. 1000 milliGRAM(s) IV Intermittent once  acetaminophen   IVPB .. 1000 milliGRAM(s) IV Intermittent once  aMIOdarone Infusion 0.5 mG/Min (16.7 mL/Hr) IV Continuous <Continuous>  aspirin enteric coated 325 milliGRAM(s) Oral daily  aspirin Suppository 300 milliGRAM(s) Rectal once  ceFAZolin   IVPB 1000 milliGRAM(s) IV Intermittent every 8 hours  chlorhexidine 2% Cloths 1 Application(s) Topical daily  dextrose 50% Injectable 50 milliLiter(s) IV Push every 15 minutes  dextrose 50% Injectable 25 milliLiter(s) IV Push every 15 minutes  DOBUTamine Infusion 3 MICROgram(s)/kG/Min (7.47 mL/Hr) IV Continuous <Continuous>  famotidine Injectable 20 milliGRAM(s) IV Push every 12 hours  insulin regular Infusion 5 Unit(s)/Hr (5 mL/Hr) IV Continuous <Continuous>  meperidine     Injectable 25 milliGRAM(s) IV Push once  niCARdipine Infusion 5 mG/Hr (25 mL/Hr) IV Continuous <Continuous>  nitroglycerin  Infusion 15 MICROgram(s)/Min (4.5 mL/Hr) IV Continuous <Continuous>  norepinephrine Infusion 0.05 MICROgram(s)/kG/Min (7.78 mL/Hr) IV Continuous <Continuous>  polyethylene glycol 3350 17 Gram(s) Oral daily  propofol Infusion 15 MICROgram(s)/kG/Min (7.47 mL/Hr) IV Continuous <Continuous>  senna 2 Tablet(s) Oral at bedtime  sodium chloride 0.9%. 1000 milliLiter(s) (20 mL/Hr) IV Continuous <Continuous>  vancomycin  IVPB 1000 milliGRAM(s) IV Intermittent every 12 hours  vasopressin Infusion 0.04 Unit(s)/Min (6 mL/Hr) IV Continuous <Continuous>    MEDICATIONS  (PRN):  fentaNYL    Injectable 25 MICROGram(s) IV Push every 4 hours PRN Severe Pain (7 - 10)  HYDROmorphone  Injectable 0.5 milliGRAM(s) IV Push every 6 hours PRN Breakthrough Pain  ketorolac   Injectable 15 milliGRAM(s) IV Push every 4 hours PRN Severe Pain (7 - 10)  ondansetron Injectable 4 milliGRAM(s) IV Push every 4 hours PRN Nausea and/or Vomiting  oxyCODONE    IR 5 milliGRAM(s) Oral every 4 hours PRN Moderate Pain (4 - 6)  oxyCODONE    IR 10 milliGRAM(s) Oral every 4 hours PRN Severe Pain (7 - 10)    Home Medications:  Aspir 81 oral delayed release tablet: 1 tab(s) orally once a day (17 May 2024 14:10)  Lipitor 20 mg oral tablet: 1 tab(s) orally once a day (17 May 2024 14:10)  losartan 50 mg oral tablet: 1 tab(s) orally once a day (17 May 2024 14:10)  Metoprolol Tartrate 25 mg oral tablet: 1 tab(s) orally 2 times a day (17 May 2024 14:10)      Pharmacologic DVT Prophylaxis [x] YES, [] NO: Contraindication:  SCD's: YES b/l    GI Prophylaxis:     Post-Op Beta-Blockers: [x] Yes, [] No: contraindication:  Post-Op Aspirin:  [x] Yes, [] No: contraindication:  Post-Op Statin:  [x] Yes, [] No: contraindication:    Ambulation/Activity Status: ambulate     Assessment/Plan:  63y Male status-post CABGx3 POD#1  - Case and plan discussed with CTU Intensivist and CT Surgeon - Dr. Rodriguez/Tamika/José/Raghav  - Continue CTU supportive care and ongoing plan of care as per continuing CTU rounds.   - Continue DVT/GI prophylaxis  - Incentive Spirometry 10 times an hour  - Continue to advance physical activity as tolerated and continue PT/OT as directed  1. CAD s/p CABG: Continue ASA, statin, BB, pain control as needed, d/c swan, wean dobutamine 1cc/hr   2. HTN: cont to monitor, start low dose bb   3. Post-op A. Fib ppx: monitor electrolytes, bb started   4. COPD/Hypoxia: cont nebs, wean o2 as tolerated, encourage incentive spirometry   5. DM/Glucose Control: insulin sliding scale     Social Service Disposition:  pt to asses

## 2024-05-22 NOTE — PHYSICAL THERAPY INITIAL EVALUATION ADULT - GAIT DEVIATIONS NOTED, PT EVAL
Decreased speed, mild forward flexed posture. /63 in standing via A line, no c/o dizziness during activity./decreased melissa/decreased step length/decreased stride length

## 2024-05-22 NOTE — PHYSICAL THERAPY INITIAL EVALUATION ADULT - ADDITIONAL COMMENTS
Pt resides alone in 1st floor apt, no stairs to negotiate. Pt used to be independent with overall functional mobility, able to ambulate using No AD at baseline
Pt resides alone in 1st floor apt, no stairs to negotiate. Pt used to be independent with overall functional mobility, able to ambulate using No AD at baseline

## 2024-05-22 NOTE — PHYSICAL THERAPY INITIAL EVALUATION ADULT - GENERAL OBSERVATIONS, REHAB EVAL
Pt went to OR for CABG sx, PT to initiate b/s PT Re-evaluation once pt is medically cleared to resume OOB activities with PT.
1300 - 1330 Pt rec in bed in chair mode with +A line, +chest tube, +NC 2 L/m, +kim, in NAD with KB Ventura in room.
14:10-14:25. chart reviewed. Pt received semi-brock at B/S, alert, oriented, able to follow multi-step instructions and agreeable to PT evaluation. + tele, on RA, VSS, denies pain or discomfort. No SOB. Pt demonstrates baseline mobility, able to ambulate without AD, pending CABG further PT referral post op.

## 2024-05-23 LAB
ALBUMIN SERPL ELPH-MCNC: 3.4 G/DL — LOW (ref 3.5–5.2)
ALBUMIN SERPL ELPH-MCNC: 3.5 G/DL — SIGNIFICANT CHANGE UP (ref 3.5–5.2)
ALP SERPL-CCNC: 45 U/L — SIGNIFICANT CHANGE UP (ref 30–115)
ALP SERPL-CCNC: 50 U/L — SIGNIFICANT CHANGE UP (ref 30–115)
ALT FLD-CCNC: 13 U/L — SIGNIFICANT CHANGE UP (ref 0–41)
ALT FLD-CCNC: 15 U/L — SIGNIFICANT CHANGE UP (ref 0–41)
ANION GAP SERPL CALC-SCNC: 10 MMOL/L — SIGNIFICANT CHANGE UP (ref 7–14)
ANION GAP SERPL CALC-SCNC: 8 MMOL/L — SIGNIFICANT CHANGE UP (ref 7–14)
APTT BLD: 32.2 SEC — SIGNIFICANT CHANGE UP (ref 27–39.2)
AST SERPL-CCNC: 24 U/L — SIGNIFICANT CHANGE UP (ref 0–41)
AST SERPL-CCNC: 28 U/L — SIGNIFICANT CHANGE UP (ref 0–41)
BASOPHILS # BLD AUTO: 0.01 K/UL — SIGNIFICANT CHANGE UP (ref 0–0.2)
BASOPHILS NFR BLD AUTO: 0.1 % — SIGNIFICANT CHANGE UP (ref 0–1)
BILIRUB SERPL-MCNC: 0.4 MG/DL — SIGNIFICANT CHANGE UP (ref 0.2–1.2)
BILIRUB SERPL-MCNC: 0.5 MG/DL — SIGNIFICANT CHANGE UP (ref 0.2–1.2)
BUN SERPL-MCNC: 16 MG/DL — SIGNIFICANT CHANGE UP (ref 10–20)
BUN SERPL-MCNC: 18 MG/DL — SIGNIFICANT CHANGE UP (ref 10–20)
CALCIUM SERPL-MCNC: 7.9 MG/DL — LOW (ref 8.4–10.5)
CALCIUM SERPL-MCNC: 8.2 MG/DL — LOW (ref 8.4–10.4)
CHLORIDE SERPL-SCNC: 103 MMOL/L — SIGNIFICANT CHANGE UP (ref 98–110)
CHLORIDE SERPL-SCNC: 107 MMOL/L — SIGNIFICANT CHANGE UP (ref 98–110)
CO2 SERPL-SCNC: 22 MMOL/L — SIGNIFICANT CHANGE UP (ref 17–32)
CO2 SERPL-SCNC: 25 MMOL/L — SIGNIFICANT CHANGE UP (ref 17–32)
CREAT SERPL-MCNC: 1.1 MG/DL — SIGNIFICANT CHANGE UP (ref 0.7–1.5)
CREAT SERPL-MCNC: 1.1 MG/DL — SIGNIFICANT CHANGE UP (ref 0.7–1.5)
EGFR: 75 ML/MIN/1.73M2 — SIGNIFICANT CHANGE UP
EGFR: 75 ML/MIN/1.73M2 — SIGNIFICANT CHANGE UP
EOSINOPHIL # BLD AUTO: 0.01 K/UL — SIGNIFICANT CHANGE UP (ref 0–0.7)
EOSINOPHIL NFR BLD AUTO: 0.1 % — SIGNIFICANT CHANGE UP (ref 0–8)
GLUCOSE BLDC GLUCOMTR-MCNC: 128 MG/DL — HIGH (ref 70–99)
GLUCOSE BLDC GLUCOMTR-MCNC: 136 MG/DL — HIGH (ref 70–99)
GLUCOSE BLDC GLUCOMTR-MCNC: 137 MG/DL — HIGH (ref 70–99)
GLUCOSE BLDC GLUCOMTR-MCNC: 163 MG/DL — HIGH (ref 70–99)
GLUCOSE SERPL-MCNC: 139 MG/DL — HIGH (ref 70–99)
GLUCOSE SERPL-MCNC: 161 MG/DL — HIGH (ref 70–99)
HCT VFR BLD CALC: 34.4 % — LOW (ref 42–52)
HCT VFR BLD CALC: 34.5 % — LOW (ref 42–52)
HGB BLD-MCNC: 11.6 G/DL — LOW (ref 14–18)
HGB BLD-MCNC: 11.7 G/DL — LOW (ref 14–18)
IMM GRANULOCYTES NFR BLD AUTO: 0.7 % — HIGH (ref 0.1–0.3)
INR BLD: 1.36 RATIO — HIGH (ref 0.65–1.3)
LYMPHOCYTES # BLD AUTO: 1.18 K/UL — LOW (ref 1.2–3.4)
LYMPHOCYTES # BLD AUTO: 9.9 % — LOW (ref 20.5–51.1)
MAGNESIUM SERPL-MCNC: 2.3 MG/DL — SIGNIFICANT CHANGE UP (ref 1.8–2.4)
MCHC RBC-ENTMCNC: 28.6 PG — SIGNIFICANT CHANGE UP (ref 27–31)
MCHC RBC-ENTMCNC: 28.9 PG — SIGNIFICANT CHANGE UP (ref 27–31)
MCHC RBC-ENTMCNC: 33.6 G/DL — SIGNIFICANT CHANGE UP (ref 32–37)
MCHC RBC-ENTMCNC: 34 G/DL — SIGNIFICANT CHANGE UP (ref 32–37)
MCV RBC AUTO: 84.9 FL — SIGNIFICANT CHANGE UP (ref 80–94)
MCV RBC AUTO: 85.2 FL — SIGNIFICANT CHANGE UP (ref 80–94)
MONOCYTES # BLD AUTO: 0.79 K/UL — HIGH (ref 0.1–0.6)
MONOCYTES NFR BLD AUTO: 6.6 % — SIGNIFICANT CHANGE UP (ref 1.7–9.3)
NEUTROPHILS # BLD AUTO: 9.85 K/UL — HIGH (ref 1.4–6.5)
NEUTROPHILS NFR BLD AUTO: 82.6 % — HIGH (ref 42.2–75.2)
NRBC # BLD: 0 /100 WBCS — SIGNIFICANT CHANGE UP (ref 0–0)
NRBC # BLD: 0 /100 WBCS — SIGNIFICANT CHANGE UP (ref 0–0)
PLATELET # BLD AUTO: 108 K/UL — LOW (ref 130–400)
PLATELET # BLD AUTO: 118 K/UL — LOW (ref 130–400)
PMV BLD: 10.4 FL — SIGNIFICANT CHANGE UP (ref 7.4–10.4)
PMV BLD: 10.6 FL — HIGH (ref 7.4–10.4)
POTASSIUM SERPL-MCNC: 4 MMOL/L — SIGNIFICANT CHANGE UP (ref 3.5–5)
POTASSIUM SERPL-MCNC: 4.4 MMOL/L — SIGNIFICANT CHANGE UP (ref 3.5–5)
POTASSIUM SERPL-SCNC: 4 MMOL/L — SIGNIFICANT CHANGE UP (ref 3.5–5)
POTASSIUM SERPL-SCNC: 4.4 MMOL/L — SIGNIFICANT CHANGE UP (ref 3.5–5)
PROT SERPL-MCNC: 5.1 G/DL — LOW (ref 6–8)
PROT SERPL-MCNC: 5.2 G/DL — LOW (ref 6–8)
PROTHROM AB SERPL-ACNC: 15.6 SEC — HIGH (ref 9.95–12.87)
RBC # BLD: 4.05 M/UL — LOW (ref 4.7–6.1)
RBC # BLD: 4.05 M/UL — LOW (ref 4.7–6.1)
RBC # FLD: 13.1 % — SIGNIFICANT CHANGE UP (ref 11.5–14.5)
RBC # FLD: 13.2 % — SIGNIFICANT CHANGE UP (ref 11.5–14.5)
SODIUM SERPL-SCNC: 136 MMOL/L — SIGNIFICANT CHANGE UP (ref 135–146)
SODIUM SERPL-SCNC: 139 MMOL/L — SIGNIFICANT CHANGE UP (ref 135–146)
WBC # BLD: 11.51 K/UL — HIGH (ref 4.8–10.8)
WBC # BLD: 11.92 K/UL — HIGH (ref 4.8–10.8)
WBC # FLD AUTO: 11.51 K/UL — HIGH (ref 4.8–10.8)
WBC # FLD AUTO: 11.92 K/UL — HIGH (ref 4.8–10.8)

## 2024-05-23 PROCEDURE — 71045 X-RAY EXAM CHEST 1 VIEW: CPT | Mod: 26

## 2024-05-23 PROCEDURE — 93010 ELECTROCARDIOGRAM REPORT: CPT

## 2024-05-23 PROCEDURE — 99233 SBSQ HOSP IP/OBS HIGH 50: CPT

## 2024-05-23 RX ORDER — ACETAMINOPHEN 500 MG
1000 TABLET ORAL ONCE
Refills: 0 | Status: COMPLETED | OUTPATIENT
Start: 2024-05-24 | End: 2024-05-24

## 2024-05-23 RX ORDER — ACETAMINOPHEN 500 MG
1000 TABLET ORAL ONCE
Refills: 0 | Status: COMPLETED | OUTPATIENT
Start: 2024-05-23 | End: 2024-05-23

## 2024-05-23 RX ORDER — FAMOTIDINE 10 MG/ML
20 INJECTION INTRAVENOUS
Refills: 0 | Status: DISCONTINUED | OUTPATIENT
Start: 2024-05-23 | End: 2024-05-29

## 2024-05-23 RX ORDER — FUROSEMIDE 40 MG
20 TABLET ORAL ONCE
Refills: 0 | Status: COMPLETED | OUTPATIENT
Start: 2024-05-23 | End: 2024-05-23

## 2024-05-23 RX ORDER — POTASSIUM CHLORIDE 20 MEQ
40 PACKET (EA) ORAL ONCE
Refills: 0 | Status: COMPLETED | OUTPATIENT
Start: 2024-05-23 | End: 2024-05-23

## 2024-05-23 RX ADMIN — Medication 15 MILLIGRAM(S): at 17:52

## 2024-05-23 RX ADMIN — CHLORHEXIDINE GLUCONATE 1 APPLICATION(S): 213 SOLUTION TOPICAL at 21:44

## 2024-05-23 RX ADMIN — Medication 15 MILLIGRAM(S): at 17:22

## 2024-05-23 RX ADMIN — SENNA PLUS 2 TABLET(S): 8.6 TABLET ORAL at 21:40

## 2024-05-23 RX ADMIN — HEPARIN SODIUM 3000 UNIT(S): 5000 INJECTION INTRAVENOUS; SUBCUTANEOUS at 17:22

## 2024-05-23 RX ADMIN — Medication 1: at 16:41

## 2024-05-23 RX ADMIN — FAMOTIDINE 20 MILLIGRAM(S): 10 INJECTION INTRAVENOUS at 18:19

## 2024-05-23 RX ADMIN — Medication 1000 MILLIGRAM(S): at 00:23

## 2024-05-23 RX ADMIN — Medication 1000 MILLIGRAM(S): at 18:49

## 2024-05-23 RX ADMIN — Medication 100 MILLIGRAM(S): at 05:06

## 2024-05-23 RX ADMIN — Medication 1000 MILLIGRAM(S): at 12:30

## 2024-05-23 RX ADMIN — Medication 1000 MILLIGRAM(S): at 07:00

## 2024-05-23 RX ADMIN — Medication 12.5 MILLIGRAM(S): at 05:06

## 2024-05-23 RX ADMIN — Medication 20 MILLIGRAM(S): at 09:16

## 2024-05-23 RX ADMIN — OXYCODONE HYDROCHLORIDE 10 MILLIGRAM(S): 5 TABLET ORAL at 03:46

## 2024-05-23 RX ADMIN — Medication 400 MILLIGRAM(S): at 05:08

## 2024-05-23 RX ADMIN — Medication 400 MILLIGRAM(S): at 12:07

## 2024-05-23 RX ADMIN — OXYCODONE HYDROCHLORIDE 10 MILLIGRAM(S): 5 TABLET ORAL at 10:04

## 2024-05-23 RX ADMIN — OXYCODONE HYDROCHLORIDE 10 MILLIGRAM(S): 5 TABLET ORAL at 04:46

## 2024-05-23 RX ADMIN — Medication 325 MILLIGRAM(S): at 11:27

## 2024-05-23 RX ADMIN — OXYCODONE HYDROCHLORIDE 10 MILLIGRAM(S): 5 TABLET ORAL at 10:34

## 2024-05-23 RX ADMIN — FAMOTIDINE 20 MILLIGRAM(S): 10 INJECTION INTRAVENOUS at 05:08

## 2024-05-23 RX ADMIN — SODIUM CHLORIDE 20 MILLILITER(S): 9 INJECTION INTRAMUSCULAR; INTRAVENOUS; SUBCUTANEOUS at 06:23

## 2024-05-23 RX ADMIN — HEPARIN SODIUM 3000 UNIT(S): 5000 INJECTION INTRAVENOUS; SUBCUTANEOUS at 05:06

## 2024-05-23 RX ADMIN — ATORVASTATIN CALCIUM 40 MILLIGRAM(S): 80 TABLET, FILM COATED ORAL at 21:40

## 2024-05-23 RX ADMIN — Medication 40 MILLIEQUIVALENT(S): at 09:15

## 2024-05-23 RX ADMIN — POLYETHYLENE GLYCOL 3350 17 GRAM(S): 17 POWDER, FOR SOLUTION ORAL at 11:27

## 2024-05-23 RX ADMIN — Medication 400 MILLIGRAM(S): at 18:19

## 2024-05-23 NOTE — PROGRESS NOTE ADULT - SUBJECTIVE AND OBJECTIVE BOX
AVIVA HOFFMAN  MRN#: [id number]  Subjective:  Patient was seen and evalauted on AM rounds offerring no specific compliants at this time.    OBJECTIVE:  ICU Vital Signs Last 24 Hrs  T(C): 37.6 (23 May 2024 10:00), Max: 37.8 (23 May 2024 00:00)  T(F): 99.7 (23 May 2024 10:00), Max: 100 (23 May 2024 00:00)  HR: 80 (23 May 2024 09:45) (69 - 94)  BP: 100/62 (23 May 2024 07:00) (100/62 - 133/72)  BP(mean): 77 (23 May 2024 07:00) (73 - 95)  ABP: 119/60 (23 May 2024 09:45) (35/35 - 141/60)  ABP(mean): 81 (23 May 2024 09:45) (35 - 94)  RR: 15 (23 May 2024 09:45) (12 - 41)  SpO2: 96% (23 May 2024 09:45) (87% - 100%)    O2 Parameters below as of 23 May 2024 10:00  Patient On (Oxygen Delivery Method): nasal cannula  O2 Flow (L/min): 3           @ 07: @ 07:00  --------------------------------------------------------  IN: 1597.4 mL / OUT: 1817 mL / NET: -219.6 mL     @ 07: @ 10:43  --------------------------------------------------------  IN: 500 mL / OUT: 125 mL / NET: 375 mL      CAPILLARY BLOOD GLUCOSE      POCT Blood Glucose.: 128 mg/dL (23 May 2024 06:44)      PHYSICAL EXAM:Daily     Daily Weight in k.8 (23 May 2024 05:45)  General: WN/WD NAD    HEENT:     + NCAT  + EOMI  - Conjuctival edema   - Icterus   - Thrush   - ETT  - NGT/OGT    Neck:         + FROM    - JVD     - Nodes     - Masses    + Mid-line trachea   - Tracheostomy    Chest:         - Sternal click  - Sternal drainage  + Pacing wires  + Chest tubes  - SubQ emphysema    Lungs:          + CTA   - Rhonchi    - Rales    - Wheezing     - Decreased BS   - Dullness R L    Cardiac:       + S1 + S2    + RRR   - Irregular   - S3  - S4    - Murmurs   - Rub   - Hamman’s sign     Abdomen:    + BS     + Soft    + Non-tender     - Distended    - Organomegaly  - PEG    Extremities:   - Cyanosis U/L   - Clubbing  U/L  - LE/UE Edema   + Capillary refill    + Pulses     Neuro:        + Awake   +  Alert   - Confused   - Lethargic   - Sedated   - Generalized Weakness    Skin:        - Rashes    - Erythema   + Normal incisions   + IV sites intact  - Sacral decubitus    HOSPITAL MEDICATIONS:  MEDICATIONS  (STANDING):  acetaminophen     Tablet .. 650 milliGRAM(s) Oral every 6 hours  acetaminophen   IVPB .. 1000 milliGRAM(s) IV Intermittent once  aMIOdarone Infusion 0.5 mG/Min (16.7 mL/Hr) IV Continuous <Continuous>  aspirin enteric coated 325 milliGRAM(s) Oral daily  atorvastatin 40 milliGRAM(s) Oral at bedtime  bisacodyl Suppository 10 milliGRAM(s) Rectal once  chlorhexidine 2% Cloths 1 Application(s) Topical daily  dextrose 10% Bolus 125 milliLiter(s) IV Bolus once  dextrose 5%. 1000 milliLiter(s) (50 mL/Hr) IV Continuous <Continuous>  dextrose 5%. 1000 milliLiter(s) (100 mL/Hr) IV Continuous <Continuous>  dextrose 50% Injectable 25 milliLiter(s) IV Push every 15 minutes  dextrose 50% Injectable 50 milliLiter(s) IV Push every 15 minutes  DOBUTamine Infusion 3 MICROgram(s)/kG/Min (7.47 mL/Hr) IV Continuous <Continuous>  famotidine Injectable 20 milliGRAM(s) IV Push every 12 hours  glucagon  Injectable 1 milliGRAM(s) IntraMuscular once  heparin   Injectable 3000 Unit(s) SubCutaneous every 12 hours  insulin lispro (ADMELOG) corrective regimen sliding scale   SubCutaneous at bedtime  insulin lispro (ADMELOG) corrective regimen sliding scale   SubCutaneous three times a day before meals  meperidine     Injectable 25 milliGRAM(s) IV Push once  metoprolol tartrate 12.5 milliGRAM(s) Oral every 12 hours  norepinephrine Infusion 0.05 MICROgram(s)/kG/Min (7.78 mL/Hr) IV Continuous <Continuous>  polyethylene glycol 3350 17 Gram(s) Oral daily  senna 2 Tablet(s) Oral at bedtime  sodium chloride 0.9%. 1000 milliLiter(s) (20 mL/Hr) IV Continuous <Continuous>    MEDICATIONS  (PRN):  dextrose Oral Gel 15 Gram(s) Oral once PRN Blood Glucose LESS THAN 70 milliGRAM(s)/deciliter  fentaNYL    Injectable 25 MICROGram(s) IV Push every 4 hours PRN Severe Pain (7 - 10)  HYDROmorphone  Injectable 0.5 milliGRAM(s) IV Push every 6 hours PRN Breakthrough Pain  ketorolac   Injectable 15 milliGRAM(s) IV Push every 4 hours PRN Severe Pain (7 - 10)  ondansetron Injectable 4 milliGRAM(s) IV Push every 4 hours PRN Nausea and/or Vomiting  oxyCODONE    IR 5 milliGRAM(s) Oral every 4 hours PRN Moderate Pain (4 - 6)  oxyCODONE    IR 10 milliGRAM(s) Oral every 4 hours PRN Severe Pain (7 - 10)      LABS:                        11.6   11.92 )-----------( 108      ( 23 May 2024 02:30 )             34.5        139  |  107  |  16  ----------------------------<  139<H>  4.0   |  22  |  1.1    Ca    7.9<L>      23 May 2024 02:30  Mg     2.4         TPro  5.1<L>  /  Alb  3.4<L>  /  TBili  0.5  /  DBili  x   /  AST  28  /  ALT  15  /  AlkPhos  45  23    PT/INR - ( 23 May 2024 02:30 )   PT: 15.60 sec;   INR: 1.36 ratio         PTT - ( 23 May 2024 02:30 )  PTT:32.2 sec LIVER FUNCTIONS - ( 23 May 2024 02:30 )  Alb: 3.4 g/dL / Pro: 5.1 g/dL / ALK PHOS: 45 U/L / ALT: 15 U/L / AST: 28 U/L / GGT: x           Urinalysis Basic - ( 23 May 2024 02:30 )    Color: x / Appearance: x / SG: x / pH: x  Gluc: 139 mg/dL / Ketone: x  / Bili: x / Urobili: x   Blood: x / Protein: x / Nitrite: x   Leuk Esterase: x / RBC: x / WBC x   Sq Epi: x / Non Sq Epi: x / Bacteria: x        RADIOLOGY:  X Reviewed and interpreted by me: L-base opacity/effusion    CARDIOPULMONARY DYSFUNCTION  - Respiratory status required supplemental oxygen & the following of continuous pulse oximetry for support & to prevent decompensation  - Continued early mobilization as tolerated  - Addressed analgesic regimen to optimize function    PREVENTION-PROPHYLAXIS  - ASA continued for graft occlusion-thromboembolism prophylaxis  - Lipitor was also started for long term graft patency  - Heparin continued for VTE prophylaxis in addition to Venodyne boots  - Pepcid maintained for GI bleeding prophylaxis  - Lopressor continued for atrial fibrillation prophylaxis  - Metabolic stability & infection prophylaxis required review and adjustment of regular Insulin sliding scale and gylcemic regimen while following serial glucose levels to help achieve & maintain euglycemia  - Reviewed & addressed surgical site infection prophylaxis regimen

## 2024-05-23 NOTE — PROGRESS NOTE ADULT - SUBJECTIVE AND OBJECTIVE BOX
OPERATIVE PROCEDURE(s):                POD #        2               63yMale  SURGEON(s): MIGUEL Kumar  SUBJECTIVE ASSESSMENT:   Vital Signs Last 24 Hrs  T(F): 99.9 (23 May 2024 08:00), Max: 100 (23 May 2024 00:00)  HR: 74 (23 May 2024 07:45) (69 - 94)  BP: 100/62 (23 May 2024 07:00) (100/62 - 133/72)  BP(mean): 77 (23 May 2024 07:00) (73 - 95)  ABP: 95/51 (23 May 2024 07:45) (35/35 - 141/60)  ABP(mean): 69 (23 May 2024 07:45)  RR: 20 (23 May 2024 07:45) (12 - 41)  SpO2: 98% (23 May 2024 07:45) (87% - 100%) 3L    I&O's Detail    22 May 2024 07:01  -  23 May 2024 07:00  --------------------------------------------------------  IN:    DOBUTamine: 29.5 mL    Insulin: 2 mL    IV PiggyBack: 550 mL    Nitroglycerin: 6 mL    Norepinephrine: 24.3 mL    Norepinephrine: 55.6 mL    Oral Fluid: 450 mL    sodium chloride 0.9%: 480 mL  Total IN: 1597.4 mL    OUT:    Amiodarone: 0 mL    Chest Tube (mL): 114 mL    Chest Tube (mL): 405 mL    Dexmedetomidine: 0 mL    Indwelling Catheter - Urethral (mL): 1298 mL    NiCARdipine: 0 mL    Propofol: 0 mL    Vasopressin: 0 mL  Total OUT: 1817 mL        Net:   I&O's Detail    21 May 2024 07:01  -  22 May 2024 07:00  --------------------------------------------------------  Total NET: 822.4 mL      22 May 2024 07:01  -  23 May 2024 07:00  --------------------------------------------------------  Total NET: -219.6 mL        CAPILLARY BLOOD GLUCOSE      POCT Blood Glucose.: 128 mg/dL (23 May 2024 06:44)  POCT Blood Glucose.: 135 mg/dL (22 May 2024 22:59)  POCT Blood Glucose.: 156 mg/dL (22 May 2024 16:19)  POCT Blood Glucose.: 128 mg/dL (22 May 2024 11:02)  POCT Blood Glucose.: 109 mg/dL (22 May 2024 09:28)  POCT Blood Glucose.: 113 mg/dL (22 May 2024 08:26)    Physical Exam:  General: NAD; A&Ox3/Patient is intubated and sedated  Cardiac: S1/S2, RRR, no murmur, no rubs  Lungs: unlabored respirations, CTA b/l, no wheeze, no rales, no crackles  Abdomen: Soft/NT/ND; positive bowel sounds x 4  Sternum: Intact, no click, incision healing well with no drainage  Incisions: Incisions clean/dry/intact  Extremities: No edema b/l lower extremities; good capillary refill; no cyanosis; palpable 1+ pedal pulses b/l    Central Venous Catheter: Yes[]  No[] , If Yes indication:           Day #  Zafar Catheter: Yes  [] , No  [] , If yes indication:                      Day #  NGT: Yes [] No [] ,    If Yes Placement:                                     Day #  EPICARDIAL WIRES:  [] YES [] NO                                              Day #  BOWEL MOVEMENT:  [] YES [] NO, If No, Timing since last BM:      Day #  CHEST TUBE(Left/Right):  [] YES [] NO, If yes -  AIR LEAKS:  [] YES [] NO        LABS:                        11.6<L>  11.92<H> )-----------( 108<L>    ( 23 May 2024 02:30 )             34.5<L>                        12.1<L>  12.86<H> )-----------( 116<L>    ( 22 May 2024 13:11 )             35.1<L>    05-23    139  |  107  |  16  ----------------------------<  139<H>  4.0   |  22  |  1.1  05-22    137  |  105  |  16  ----------------------------<  131<H>  4.1   |  21  |  1.0    Ca    7.9<L>      23 May 2024 02:30  Mg     2.4     05-22    TPro  5.1<L> [6.0 - 8.0]  /  Alb  3.4<L> [3.5 - 5.2]  /  TBili  0.5 [0.2 - 1.2]  /  DBili  x   /  AST  28 [0 - 41]  /  ALT  15 [0 - 41]  /  AlkPhos  45 [30 - 115]  05-23    PT/INR - ( 23 May 2024 02:30 )   PT: ;   INR: 1.36 ratio         PT/INR - ( 22 May 2024 01:25 )   PT: ;   INR: 1.10 ratio         PTT - ( 23 May 2024 02:30 )  PTT:32.2 sec, PTT - ( 22 May 2024 01:25 )  PTT:34.2 sec  Urinalysis Basic - ( 23 May 2024 02:30 )    Color: x / Appearance: x / SG: x / pH: x  Gluc: 139 mg/dL / Ketone: x  / Bili: x / Urobili: x   Blood: x / Protein: x / Nitrite: x   Leuk Esterase: x / RBC: x / WBC x   Sq Epi: x / Non Sq Epi: x / Bacteria: x      ABG - ( 22 May 2024 11:51 )  pH: 7.46  /  pCO2: 32    /  pO2: 95    / HCO3: 23    / Base Excess: -0.4  /  SaO2: 99.1  /  LA: 1.1              RADIOLOGY & ADDITIONAL TESTS:  CXR:  EKG:  MEDICATIONS  (STANDING):  acetaminophen     Tablet .. 650 milliGRAM(s) Oral every 6 hours  acetaminophen   IVPB .. 1000 milliGRAM(s) IV Intermittent once  aMIOdarone Infusion 0.5 mG/Min (16.7 mL/Hr) IV Continuous <Continuous>  aspirin enteric coated 325 milliGRAM(s) Oral daily  atorvastatin 40 milliGRAM(s) Oral at bedtime  bisacodyl Suppository 10 milliGRAM(s) Rectal once  chlorhexidine 2% Cloths 1 Application(s) Topical daily  dextrose 10% Bolus 125 milliLiter(s) IV Bolus once  dextrose 5%. 1000 milliLiter(s) (50 mL/Hr) IV Continuous <Continuous>  dextrose 5%. 1000 milliLiter(s) (100 mL/Hr) IV Continuous <Continuous>  dextrose 50% Injectable 50 milliLiter(s) IV Push every 15 minutes  dextrose 50% Injectable 25 milliLiter(s) IV Push every 15 minutes  DOBUTamine Infusion 3 MICROgram(s)/kG/Min (7.47 mL/Hr) IV Continuous <Continuous>  famotidine Injectable 20 milliGRAM(s) IV Push every 12 hours  glucagon  Injectable 1 milliGRAM(s) IntraMuscular once  heparin   Injectable 3000 Unit(s) SubCutaneous every 12 hours  insulin lispro (ADMELOG) corrective regimen sliding scale   SubCutaneous at bedtime  insulin lispro (ADMELOG) corrective regimen sliding scale   SubCutaneous three times a day before meals  meperidine     Injectable 25 milliGRAM(s) IV Push once  metoprolol tartrate 12.5 milliGRAM(s) Oral every 12 hours  norepinephrine Infusion 0.05 MICROgram(s)/kG/Min (7.78 mL/Hr) IV Continuous <Continuous>  polyethylene glycol 3350 17 Gram(s) Oral daily  senna 2 Tablet(s) Oral at bedtime  sodium chloride 0.9%. 1000 milliLiter(s) (20 mL/Hr) IV Continuous <Continuous>    MEDICATIONS  (PRN):  dextrose Oral Gel 15 Gram(s) Oral once PRN Blood Glucose LESS THAN 70 milliGRAM(s)/deciliter  fentaNYL    Injectable 25 MICROGram(s) IV Push every 4 hours PRN Severe Pain (7 - 10)  HYDROmorphone  Injectable 0.5 milliGRAM(s) IV Push every 6 hours PRN Breakthrough Pain  ketorolac   Injectable 15 milliGRAM(s) IV Push every 4 hours PRN Severe Pain (7 - 10)  ondansetron Injectable 4 milliGRAM(s) IV Push every 4 hours PRN Nausea and/or Vomiting  oxyCODONE    IR 10 milliGRAM(s) Oral every 4 hours PRN Severe Pain (7 - 10)  oxyCODONE    IR 5 milliGRAM(s) Oral every 4 hours PRN Moderate Pain (4 - 6)    HEPARIN:  [] YES [] NO  Dose: XX UNITS/HR UNITS Q8H  LOVENOX:[] YES [] NO  Dose: XX mg Q24H  COUMADIN: []  YES [] NO  Dose: XX mg  Q24H  SCD's: YES b/l  GI Prophylaxis: Protonix [], Pepcid [], None [], (Contra-indication:.....)    Post-Op Beta-Blockers: Yes [], No[], If No, then contraindication:  Post-Op Aspirin: Yes [],  No [], If No, then contraindication:  Post-Op Statin: Yes [], No[], If No, then contraindication:  Allergies    No Known Allergies    Intolerances      Ambulation/Activity Status:    Assessment/Plan:  63y Male status-post .....  - Case and plan discussed with CTU Intensivist and CT Surgeon - Dr. Rodriguez/Tamika/José  - Continue CTU supportive care    - Continue DVT/GI prophylaxis  - Incentive Spirometry 10 times an hour  - Continue to advance physical activity as tolerated and continue PT/OT as directed  1. CAD: Continue ASA, statin, BB  2. HTN:   3. A. Fib:   4. COPD/Hypoxia:   5. DM/Glucose Control:     Social Service Disposition:     OPERATIVE PROCEDURE(s):     CABGx3               POD #        2               63yMale  SURGEON(s): MIGUEL Kumar  SUBJECTIVE ASSESSMENT: pt seen and examined. no acute complaints at this time.  Vital Signs Last 24 Hrs  T(F): 99.9 (23 May 2024 08:00), Max: 100 (23 May 2024 00:00)  HR: 74 (23 May 2024 07:45) (69 - 94)  BP: 100/62 (23 May 2024 07:00) (100/62 - 133/72)  BP(mean): 77 (23 May 2024 07:00) (73 - 95)  ABP: 95/51 (23 May 2024 07:45) (35/35 - 141/60)  ABP(mean): 69 (23 May 2024 07:45)  RR: 20 (23 May 2024 07:45) (12 - 41)  SpO2: 98% (23 May 2024 07:45) (87% - 100%) 3L    I&O's Detail    22 May 2024 07:01  -  23 May 2024 07:00  --------------------------------------------------------  IN:    DOBUTamine: 29.5 mL    Insulin: 2 mL    IV PiggyBack: 550 mL    Nitroglycerin: 6 mL    Norepinephrine: 24.3 mL    Norepinephrine: 55.6 mL    Oral Fluid: 450 mL    sodium chloride 0.9%: 480 mL  Total IN: 1597.4 mL    OUT:    Amiodarone: 0 mL    Chest Tube (mL): 114 mL    Chest Tube (mL): 405 mL    Dexmedetomidine: 0 mL    Indwelling Catheter - Urethral (mL): 1298 mL    NiCARdipine: 0 mL    Propofol: 0 mL    Vasopressin: 0 mL  Total OUT: 1817 mL        Net:   I&O's Detail    21 May 2024 07:01  -  22 May 2024 07:00  --------------------------------------------------------  Total NET: 822.4 mL      22 May 2024 07:01  -  23 May 2024 07:00  --------------------------------------------------------  Total NET: -219.6 mL        CAPILLARY BLOOD GLUCOSE      POCT Blood Glucose.: 128 mg/dL (23 May 2024 06:44)  POCT Blood Glucose.: 135 mg/dL (22 May 2024 22:59)  POCT Blood Glucose.: 156 mg/dL (22 May 2024 16:19)  POCT Blood Glucose.: 128 mg/dL (22 May 2024 11:02)  POCT Blood Glucose.: 109 mg/dL (22 May 2024 09:28)  POCT Blood Glucose.: 113 mg/dL (22 May 2024 08:26)    Physical Exam:  General: NAD; A&Ox3, no focal deficits, clear speech   Neuro: pupils equal and reactive, speech clear, no overt sensory or motor deficts  Cardiac: S1/S2, RRR, no murmur, no rubs  Lungs: unlabored respirations, CTA b/l, no wheeze, no rales, no crackles  Abdomen: Soft/NT/ND; positive bowel sounds x 4  Sternum: Intact, no click, incision healing well with no drainage  Incisions: Incisions clean/dry/intact  Extremities: No edema b/l lower extremities; good capillary refill; no cyanosis; palpable 1+ pedal pulses b/l    Central Venous Catheter: Yes: critical illness, intravenous access  Day #2  Zafar Catheter: Yes: critical illness; monitor strict i/o's                    Day #2  EPICARDIAL WIRES:  YES                                                                         Day #2  BOWEL MOVEMENT:  [] YES [x] NO, If No, Timing since last BM Day #  CHEST TUBE(MS/Left/Right):  [x] YES [] NO, If yes -  AIR LEAKS:  YES/NO    LABS:                        11.6<L>  11.92<H> )-----------( 108<L>    ( 23 May 2024 02:30 )             34.5<L>                        12.1<L>  12.86<H> )-----------( 116<L>    ( 22 May 2024 13:11 )             35.1<L>    05-23    139  |  107  |  16  ----------------------------<  139<H>  4.0   |  22  |  1.1  05-22    137  |  105  |  16  ----------------------------<  131<H>  4.1   |  21  |  1.0    Ca    7.9<L>      23 May 2024 02:30  Mg     2.4     05-22    TPro  5.1<L> [6.0 - 8.0]  /  Alb  3.4<L> [3.5 - 5.2]  /  TBili  0.5 [0.2 - 1.2]  /  DBili  x   /  AST  28 [0 - 41]  /  ALT  15 [0 - 41]  /  AlkPhos  45 [30 - 115]  05-23    PT/INR - ( 23 May 2024 02:30 )   PT: ;   INR: 1.36 ratio         PT/INR - ( 22 May 2024 01:25 )   PT: ;   INR: 1.10 ratio         PTT - ( 23 May 2024 02:30 )  PTT:32.2 sec, PTT - ( 22 May 2024 01:25 )  PTT:34.2 sec  Urinalysis Basic - ( 23 May 2024 02:30 )    Color: x / Appearance: x / SG: x / pH: x  Gluc: 139 mg/dL / Ketone: x  / Bili: x / Urobili: x   Blood: x / Protein: x / Nitrite: x   Leuk Esterase: x / RBC: x / WBC x   Sq Epi: x / Non Sq Epi: x / Bacteria: x      ABG - ( 22 May 2024 11:51 )  pH: 7.46  /  pCO2: 32    /  pO2: 95    / HCO3: 23    / Base Excess: -0.4  /  SaO2: 99.1  /  LA: 1.1              RADIOLOGY & ADDITIONAL TESTS:  CXR: < from: Xray Chest 1 View- PORTABLE-Urgent (Xray Chest 1 View- PORTABLE-Urgent .) (05.23.24 @ 13:38) >  FINDINGS/  IMPRESSION:    Support devices: Stable right IJ introducer sheath.    Cardiac/mediastinum/hilum: Stable.    Lung parenchyma/Pleura: Stable left basilar opacity. No pneumothorax.    Skeleton/soft tissues: Stable.    < end of copied text >    EKG: < from: 12 Lead ECG (05.23.24 @ 08:08) >  Ventricular Rate 78 BPM    Atrial Rate 78 BPM    P-R Interval 134 ms    QRS Duration 98 ms    Q-T Interval 358 ms    QTC Calculation(Bazett) 408 ms    P Axis 67 degrees    R Axis 25 degrees    T Axis -35 degrees    Diagnosis Line Normal sinus rhythm  Inferior infarct , age undetermined  T wave abnormality, consider lateral ischemia  Abnormal ECG    < end of copied text >    MEDICATIONS  (STANDING):  acetaminophen     Tablet .. 650 milliGRAM(s) Oral every 6 hours  acetaminophen   IVPB .. 1000 milliGRAM(s) IV Intermittent once  aMIOdarone Infusion 0.5 mG/Min (16.7 mL/Hr) IV Continuous <Continuous>  aspirin enteric coated 325 milliGRAM(s) Oral daily  atorvastatin 40 milliGRAM(s) Oral at bedtime  bisacodyl Suppository 10 milliGRAM(s) Rectal once  chlorhexidine 2% Cloths 1 Application(s) Topical daily  dextrose 10% Bolus 125 milliLiter(s) IV Bolus once  dextrose 5%. 1000 milliLiter(s) (50 mL/Hr) IV Continuous <Continuous>  dextrose 5%. 1000 milliLiter(s) (100 mL/Hr) IV Continuous <Continuous>  dextrose 50% Injectable 50 milliLiter(s) IV Push every 15 minutes  dextrose 50% Injectable 25 milliLiter(s) IV Push every 15 minutes  DOBUTamine Infusion 3 MICROgram(s)/kG/Min (7.47 mL/Hr) IV Continuous <Continuous>  famotidine Injectable 20 milliGRAM(s) IV Push every 12 hours  glucagon  Injectable 1 milliGRAM(s) IntraMuscular once  heparin   Injectable 3000 Unit(s) SubCutaneous every 12 hours  insulin lispro (ADMELOG) corrective regimen sliding scale   SubCutaneous at bedtime  insulin lispro (ADMELOG) corrective regimen sliding scale   SubCutaneous three times a day before meals  meperidine     Injectable 25 milliGRAM(s) IV Push once  metoprolol tartrate 12.5 milliGRAM(s) Oral every 12 hours  norepinephrine Infusion 0.05 MICROgram(s)/kG/Min (7.78 mL/Hr) IV Continuous <Continuous>  polyethylene glycol 3350 17 Gram(s) Oral daily  senna 2 Tablet(s) Oral at bedtime  sodium chloride 0.9%. 1000 milliLiter(s) (20 mL/Hr) IV Continuous <Continuous>    MEDICATIONS  (PRN):  dextrose Oral Gel 15 Gram(s) Oral once PRN Blood Glucose LESS THAN 70 milliGRAM(s)/deciliter  fentaNYL    Injectable 25 MICROGram(s) IV Push every 4 hours PRN Severe Pain (7 - 10)  HYDROmorphone  Injectable 0.5 milliGRAM(s) IV Push every 6 hours PRN Breakthrough Pain  ketorolac   Injectable 15 milliGRAM(s) IV Push every 4 hours PRN Severe Pain (7 - 10)  ondansetron Injectable 4 milliGRAM(s) IV Push every 4 hours PRN Nausea and/or Vomiting  oxyCODONE    IR 10 milliGRAM(s) Oral every 4 hours PRN Severe Pain (7 - 10)  oxyCODONE    IR 5 milliGRAM(s) Oral every 4 hours PRN Moderate Pain (4 - 6)    HEPARIN:  [x] YES [] NO  Dose: 3000 UNITS Q12H    SCD's: YES b/l  GI Prophylaxis: Protonix [], Pepcid [x], None [], (Contra-indication:.....)    Post-Op Beta-Blockers: Yes [x], No[], If No, then contraindication:  Post-Op Aspirin: Yes [x],  No [], If No, then contraindication:  Post-Op Statin: Yes [x], No[], If No, then contraindication:  Allergies    No Known Allergies    Intolerances      Ambulation/Activity Status: ambulate     Assessment/Plan:  63y Male status-post CABGx3 POD#2  - Case and plan discussed with CTU Intensivist and CT Surgeon - Dr. Rodriguez/Tamika/José/Raghav  - Continue CTU supportive care and ongoing plan of care as per continuing CTU rounds.   - Continue DVT/GI prophylaxis  - Incentive Spirometry 10 times an hour  - Continue to advance physical activity as tolerated and continue PT/OT as directed  1. CAD s/p CABG: Continue ASA, statin, BB, pain control as needed  2. HTN: cont to monitor, start low dose bb   3. Post-op A. Fib ppx: monitor electrolytes, bb started   4. COPD/Hypoxia: cont nebs, wean o2 as tolerated, encourage incentive spirometry, lasix for noncardiogenic fluid overload   5. DM/Glucose Control: insulin sliding scale     Social Service Disposition:  pt to asses

## 2024-05-24 LAB
ALBUMIN SERPL ELPH-MCNC: 3.6 G/DL — SIGNIFICANT CHANGE UP (ref 3.5–5.2)
ALP SERPL-CCNC: 57 U/L — SIGNIFICANT CHANGE UP (ref 30–115)
ALT FLD-CCNC: 14 U/L — SIGNIFICANT CHANGE UP (ref 0–41)
ANION GAP SERPL CALC-SCNC: 10 MMOL/L — SIGNIFICANT CHANGE UP (ref 7–14)
ANION GAP SERPL CALC-SCNC: 12 MMOL/L — SIGNIFICANT CHANGE UP (ref 7–14)
AST SERPL-CCNC: 23 U/L — SIGNIFICANT CHANGE UP (ref 0–41)
BASOPHILS # BLD AUTO: 0.02 K/UL — SIGNIFICANT CHANGE UP (ref 0–0.2)
BASOPHILS NFR BLD AUTO: 0.2 % — SIGNIFICANT CHANGE UP (ref 0–1)
BILIRUB SERPL-MCNC: 0.5 MG/DL — SIGNIFICANT CHANGE UP (ref 0.2–1.2)
BUN SERPL-MCNC: 17 MG/DL — SIGNIFICANT CHANGE UP (ref 10–20)
BUN SERPL-MCNC: 19 MG/DL — SIGNIFICANT CHANGE UP (ref 10–20)
CALCIUM SERPL-MCNC: 8.4 MG/DL — SIGNIFICANT CHANGE UP (ref 8.4–10.5)
CALCIUM SERPL-MCNC: 8.7 MG/DL — SIGNIFICANT CHANGE UP (ref 8.4–10.5)
CHLORIDE SERPL-SCNC: 102 MMOL/L — SIGNIFICANT CHANGE UP (ref 98–110)
CHLORIDE SERPL-SCNC: 103 MMOL/L — SIGNIFICANT CHANGE UP (ref 98–110)
CO2 SERPL-SCNC: 24 MMOL/L — SIGNIFICANT CHANGE UP (ref 17–32)
CO2 SERPL-SCNC: 25 MMOL/L — SIGNIFICANT CHANGE UP (ref 17–32)
CREAT SERPL-MCNC: 1.1 MG/DL — SIGNIFICANT CHANGE UP (ref 0.7–1.5)
CREAT SERPL-MCNC: 1.2 MG/DL — SIGNIFICANT CHANGE UP (ref 0.7–1.5)
EGFR: 68 ML/MIN/1.73M2 — SIGNIFICANT CHANGE UP
EGFR: 75 ML/MIN/1.73M2 — SIGNIFICANT CHANGE UP
EOSINOPHIL # BLD AUTO: 0.07 K/UL — SIGNIFICANT CHANGE UP (ref 0–0.7)
EOSINOPHIL NFR BLD AUTO: 0.7 % — SIGNIFICANT CHANGE UP (ref 0–8)
GLUCOSE BLDC GLUCOMTR-MCNC: 102 MG/DL — HIGH (ref 70–99)
GLUCOSE BLDC GLUCOMTR-MCNC: 105 MG/DL — HIGH (ref 70–99)
GLUCOSE BLDC GLUCOMTR-MCNC: 116 MG/DL — HIGH (ref 70–99)
GLUCOSE BLDC GLUCOMTR-MCNC: 148 MG/DL — HIGH (ref 70–99)
GLUCOSE SERPL-MCNC: 122 MG/DL — HIGH (ref 70–99)
GLUCOSE SERPL-MCNC: 168 MG/DL — HIGH (ref 70–99)
HCT VFR BLD CALC: 35.1 % — LOW (ref 42–52)
HGB BLD-MCNC: 11.9 G/DL — LOW (ref 14–18)
IMM GRANULOCYTES NFR BLD AUTO: 0.5 % — HIGH (ref 0.1–0.3)
LYMPHOCYTES # BLD AUTO: 1.58 K/UL — SIGNIFICANT CHANGE UP (ref 1.2–3.4)
LYMPHOCYTES # BLD AUTO: 15.3 % — LOW (ref 20.5–51.1)
MAGNESIUM SERPL-MCNC: 2.5 MG/DL — HIGH (ref 1.8–2.4)
MCHC RBC-ENTMCNC: 28.7 PG — SIGNIFICANT CHANGE UP (ref 27–31)
MCHC RBC-ENTMCNC: 33.9 G/DL — SIGNIFICANT CHANGE UP (ref 32–37)
MCV RBC AUTO: 84.6 FL — SIGNIFICANT CHANGE UP (ref 80–94)
MONOCYTES # BLD AUTO: 0.79 K/UL — HIGH (ref 0.1–0.6)
MONOCYTES NFR BLD AUTO: 7.6 % — SIGNIFICANT CHANGE UP (ref 1.7–9.3)
NEUTROPHILS # BLD AUTO: 7.85 K/UL — HIGH (ref 1.4–6.5)
NEUTROPHILS NFR BLD AUTO: 75.7 % — HIGH (ref 42.2–75.2)
NRBC # BLD: 0 /100 WBCS — SIGNIFICANT CHANGE UP (ref 0–0)
PLATELET # BLD AUTO: 123 K/UL — LOW (ref 130–400)
PMV BLD: 10.5 FL — HIGH (ref 7.4–10.4)
POTASSIUM SERPL-MCNC: 3.9 MMOL/L — SIGNIFICANT CHANGE UP (ref 3.5–5)
POTASSIUM SERPL-MCNC: 4.3 MMOL/L — SIGNIFICANT CHANGE UP (ref 3.5–5)
POTASSIUM SERPL-SCNC: 3.9 MMOL/L — SIGNIFICANT CHANGE UP (ref 3.5–5)
POTASSIUM SERPL-SCNC: 4.3 MMOL/L — SIGNIFICANT CHANGE UP (ref 3.5–5)
PROT SERPL-MCNC: 5.4 G/DL — LOW (ref 6–8)
RBC # BLD: 4.15 M/UL — LOW (ref 4.7–6.1)
RBC # FLD: 13.2 % — SIGNIFICANT CHANGE UP (ref 11.5–14.5)
SODIUM SERPL-SCNC: 138 MMOL/L — SIGNIFICANT CHANGE UP (ref 135–146)
SODIUM SERPL-SCNC: 138 MMOL/L — SIGNIFICANT CHANGE UP (ref 135–146)
WBC # BLD: 10.36 K/UL — SIGNIFICANT CHANGE UP (ref 4.8–10.8)
WBC # FLD AUTO: 10.36 K/UL — SIGNIFICANT CHANGE UP (ref 4.8–10.8)

## 2024-05-24 PROCEDURE — 93010 ELECTROCARDIOGRAM REPORT: CPT

## 2024-05-24 PROCEDURE — 99232 SBSQ HOSP IP/OBS MODERATE 35: CPT

## 2024-05-24 PROCEDURE — 71045 X-RAY EXAM CHEST 1 VIEW: CPT | Mod: 26

## 2024-05-24 RX ORDER — ACETAMINOPHEN 500 MG
1000 TABLET ORAL ONCE
Refills: 0 | Status: COMPLETED | OUTPATIENT
Start: 2024-05-25 | End: 2024-05-25

## 2024-05-24 RX ORDER — ACETAMINOPHEN 500 MG
1000 TABLET ORAL ONCE
Refills: 0 | Status: COMPLETED | OUTPATIENT
Start: 2024-05-24 | End: 2024-05-24

## 2024-05-24 RX ORDER — POTASSIUM CHLORIDE 20 MEQ
20 PACKET (EA) ORAL
Refills: 0 | Status: COMPLETED | OUTPATIENT
Start: 2024-05-24 | End: 2024-05-24

## 2024-05-24 RX ORDER — FUROSEMIDE 40 MG
20 TABLET ORAL EVERY 24 HOURS
Refills: 0 | Status: DISCONTINUED | OUTPATIENT
Start: 2024-05-24 | End: 2024-05-25

## 2024-05-24 RX ORDER — METOPROLOL TARTRATE 50 MG
12.5 TABLET ORAL EVERY 8 HOURS
Refills: 0 | Status: DISCONTINUED | OUTPATIENT
Start: 2024-05-24 | End: 2024-05-29

## 2024-05-24 RX ORDER — POTASSIUM CHLORIDE 20 MEQ
20 PACKET (EA) ORAL DAILY
Refills: 0 | Status: DISCONTINUED | OUTPATIENT
Start: 2024-05-24 | End: 2024-05-29

## 2024-05-24 RX ADMIN — Medication 12.5 MILLIGRAM(S): at 14:00

## 2024-05-24 RX ADMIN — HEPARIN SODIUM 3000 UNIT(S): 5000 INJECTION INTRAVENOUS; SUBCUTANEOUS at 05:28

## 2024-05-24 RX ADMIN — ATORVASTATIN CALCIUM 40 MILLIGRAM(S): 80 TABLET, FILM COATED ORAL at 21:48

## 2024-05-24 RX ADMIN — Medication 20 MILLIGRAM(S): at 09:04

## 2024-05-24 RX ADMIN — Medication 325 MILLIGRAM(S): at 12:38

## 2024-05-24 RX ADMIN — Medication 12.5 MILLIGRAM(S): at 04:38

## 2024-05-24 RX ADMIN — FAMOTIDINE 20 MILLIGRAM(S): 10 INJECTION INTRAVENOUS at 18:09

## 2024-05-24 RX ADMIN — Medication 12.5 MILLIGRAM(S): at 21:48

## 2024-05-24 RX ADMIN — Medication 20 MILLIEQUIVALENT(S): at 07:00

## 2024-05-24 RX ADMIN — SENNA PLUS 2 TABLET(S): 8.6 TABLET ORAL at 21:48

## 2024-05-24 RX ADMIN — Medication 20 MILLIEQUIVALENT(S): at 05:28

## 2024-05-24 RX ADMIN — Medication 400 MILLIGRAM(S): at 05:28

## 2024-05-24 RX ADMIN — FAMOTIDINE 20 MILLIGRAM(S): 10 INJECTION INTRAVENOUS at 05:28

## 2024-05-24 RX ADMIN — Medication 20 MILLIEQUIVALENT(S): at 08:56

## 2024-05-24 RX ADMIN — HEPARIN SODIUM 3000 UNIT(S): 5000 INJECTION INTRAVENOUS; SUBCUTANEOUS at 18:09

## 2024-05-24 RX ADMIN — Medication 400 MILLIGRAM(S): at 12:38

## 2024-05-24 RX ADMIN — Medication 400 MILLIGRAM(S): at 18:09

## 2024-05-24 RX ADMIN — Medication 1000 MILLIGRAM(S): at 12:38

## 2024-05-24 RX ADMIN — Medication 1000 MILLIGRAM(S): at 06:16

## 2024-05-24 NOTE — PROGRESS NOTE ADULT - SUBJECTIVE AND OBJECTIVE BOX
Over Night Events:  no major events overnight, doing well  on low flow oxygen    ROS:  See HPI    PHYSICAL EXAM    ICU Vital Signs Last 24 Hrs  T(C): 37 (24 May 2024 00:00), Max: 37.6 (23 May 2024 10:00)  T(F): 98.6 (24 May 2024 00:00), Max: 99.7 (23 May 2024 10:00)  HR: 80 (24 May 2024 08:00) (76 - 94)  BP: 111/70 (24 May 2024 07:00) (102/60 - 128/74)  BP(mean): 85 (24 May 2024 07:00) (75 - 94)  ABP: 108/56 (24 May 2024 08:00) (91/50 - 148/70)  ABP(mean): 77 (24 May 2024 08:00) (62 - 100)  RR: 26 (24 May 2024 08:00) (15 - 33)  SpO2: 98% (24 May 2024 08:00) (94% - 100%)    O2 Parameters below as of 24 May 2024 07:00  Patient On (Oxygen Delivery Method): nasal cannula  O2 Flow (L/min): 3          General: NARD  HEENT: JUDIT             Lungs: Bilateral BS, decreased at the bases   Cardiovascular: Regular   Abdomen: Soft, Positive BS  Extremities:postop edema  Skin: Warm  Neurological: Non focal       05-23-24 @ 07:01  -  05-24-24 @ 07:00  --------------------------------------------------------  IN:    IV PiggyBack: 200 mL    Oral Fluid: 1487 mL  Total IN: 1687 mL    OUT:    Amiodarone: 0 mL    Chest Tube (mL): 0 mL    Chest Tube (mL): 110 mL    DOBUTamine: 0 mL    Indwelling Catheter - Urethral (mL): 1440 mL    Norepinephrine: 0 mL    sodium chloride 0.9%: 0 mL    Voided (mL): 150 mL  Total OUT: 1700 mL    Total NET: -13 mL      05-24-24 @ 07:01  -  05-24-24 @ 09:29  --------------------------------------------------------  IN:    Oral Fluid: 240 mL  Total IN: 240 mL    OUT:  Total OUT: 0 mL    Total NET: 240 mL          LABS:                          11.9   10.36 )-----------( 123      ( 24 May 2024 02:52 )             35.1                                               05-24    138  |  102  |  17  ----------------------------<  122<H>  3.9   |  24  |  1.1    Ca    8.4      24 May 2024 02:52  Mg     2.5     05-24    TPro  5.4<L>  /  Alb  3.6  /  TBili  0.5  /  DBili  x   /  AST  23  /  ALT  14  /  AlkPhos  57  05-24      PT/INR - ( 23 May 2024 02:30 )   PT: 15.60 sec;   INR: 1.36 ratio         PTT - ( 23 May 2024 02:30 )  PTT:32.2 sec                                       Urinalysis Basic - ( 24 May 2024 02:52 )    Color: x / Appearance: x / SG: x / pH: x  Gluc: 122 mg/dL / Ketone: x  / Bili: x / Urobili: x   Blood: x / Protein: x / Nitrite: x   Leuk Esterase: x / RBC: x / WBC x   Sq Epi: x / Non Sq Epi: x / Bacteria: x                                                  LIVER FUNCTIONS - ( 24 May 2024 02:52 )  Alb: 3.6 g/dL / Pro: 5.4 g/dL / ALK PHOS: 57 U/L / ALT: 14 U/L / AST: 23 U/L / GGT: x                                                                                                                                   ABG - ( 22 May 2024 11:51 )  pH, Arterial: 7.46  pH, Blood: x     /  pCO2: 32    /  pO2: 95    / HCO3: 23    / Base Excess: -0.4  /  SaO2: 99.1                MEDICATIONS  (STANDING):  acetaminophen   IVPB .. 1000 milliGRAM(s) IV Intermittent once  acetaminophen   IVPB .. 1000 milliGRAM(s) IV Intermittent once  aspirin enteric coated 325 milliGRAM(s) Oral daily  atorvastatin 40 milliGRAM(s) Oral at bedtime  bisacodyl Suppository 10 milliGRAM(s) Rectal once  chlorhexidine 2% Cloths 1 Application(s) Topical daily  dextrose 10% Bolus 125 milliLiter(s) IV Bolus once  dextrose 5%. 1000 milliLiter(s) (100 mL/Hr) IV Continuous <Continuous>  dextrose 5%. 1000 milliLiter(s) (50 mL/Hr) IV Continuous <Continuous>  dextrose 50% Injectable 50 milliLiter(s) IV Push every 15 minutes  dextrose 50% Injectable 25 milliLiter(s) IV Push every 15 minutes  famotidine    Tablet 20 milliGRAM(s) Oral two times a day  furosemide   Injectable 20 milliGRAM(s) IV Push every 24 hours  glucagon  Injectable 1 milliGRAM(s) IntraMuscular once  heparin   Injectable 3000 Unit(s) SubCutaneous every 12 hours  insulin lispro (ADMELOG) corrective regimen sliding scale   SubCutaneous three times a day before meals  insulin lispro (ADMELOG) corrective regimen sliding scale   SubCutaneous at bedtime  meperidine     Injectable 25 milliGRAM(s) IV Push once  metoprolol tartrate 12.5 milliGRAM(s) Oral every 12 hours  polyethylene glycol 3350 17 Gram(s) Oral daily  potassium chloride    Tablet ER 20 milliEquivalent(s) Oral daily  senna 2 Tablet(s) Oral at bedtime    MEDICATIONS  (PRN):  dextrose Oral Gel 15 Gram(s) Oral once PRN Blood Glucose LESS THAN 70 milliGRAM(s)/deciliter  fentaNYL    Injectable 25 MICROGram(s) IV Push every 4 hours PRN Severe Pain (7 - 10)  ketorolac   Injectable 15 milliGRAM(s) IV Push every 4 hours PRN Severe Pain (7 - 10)  ondansetron Injectable 4 milliGRAM(s) IV Push every 4 hours PRN Nausea and/or Vomiting  oxyCODONE    IR 5 milliGRAM(s) Oral every 4 hours PRN Moderate Pain (4 - 6)  oxyCODONE    IR 10 milliGRAM(s) Oral every 4 hours PRN Severe Pain (7 - 10)      Xrays:                                                                                     ECHO  left base opacity

## 2024-05-24 NOTE — PROGRESS NOTE ADULT - SUBJECTIVE AND OBJECTIVE BOX
OPERATIVE PROCEDURE(s): CABG x4               POD # 3                       SURGEON(s): MIGUEL Kumar      SUBJECTIVE ASSESSMENT:63yMale patient seen and examined at bedside. No complaints at this time    Vital Signs Last 24 Hrs  T(F): 98.6 (24 May 2024 00:00), Max: 99.7 (23 May 2024 09:00)  HR: 80 (24 May 2024 08:00) (76 - 94)  BP: 111/70 (24 May 2024 07:00) (102/60 - 128/74)  BP(mean): 85 (24 May 2024 07:00) (75 - 94)  RR: 26 (24 May 2024 08:00) (15 - 33)  SpO2: 98% (24 May 2024 08:00) (94% - 100%)      I&O's Detail    23 May 2024 07:01  -  24 May 2024 07:00  --------------------------------------------------------  IN:    IV PiggyBack: 200 mL    Oral Fluid: 1487 mL  Total IN: 1687 mL    OUT:    Amiodarone: 0 mL    Chest Tube (mL): 0 mL    Chest Tube (mL): 110 mL    DOBUTamine: 0 mL    Indwelling Catheter - Urethral (mL): 1440 mL    Norepinephrine: 0 mL    sodium chloride 0.9%: 0 mL    Voided (mL): 150 mL  Total OUT: 1700 mL        Net: I&O's Detail    22 May 2024 07:01  -  23 May 2024 07:00  --------------------------------------------------------  Total NET: -219.6 mL      23 May 2024 07:01  -  24 May 2024 07:00  --------------------------------------------------------  Total NET: -13 mL        CAPILLARY BLOOD GLUCOSE      POCT Blood Glucose.: 116 mg/dL (24 May 2024 06:48)  POCT Blood Glucose.: 136 mg/dL (23 May 2024 21:46)  POCT Blood Glucose.: 163 mg/dL (23 May 2024 16:12)  POCT Blood Glucose.: 137 mg/dL (23 May 2024 11:12)    A1C with Estimated Average Glucose Result: 6.0 % (05-18-24 @ 08:08)      Physical Exam:  General: NAD; A&Ox3  Neuro: pupils equal and reactive, speech clear, no overt sensory or motor deficts  Cardiac: S1/S2, RRR, no murmur, no rubs  Lungs: unlabored respirations, CTA b/l, no wheeze, no rales, no crackles  Abdomen: Soft/NT/ND; positive bowel sounds x 4  Sternum: Intact, no click, incision healing well with no drainage  Incisions: Incisions clean/dry/intact  Extremities: No edema b/l lower extremities; good capillary refill; no cyanosis; palpable 1+ pedal pulses b/l    Central Venous Catheter: Yes: critical illness, intravenous access  Day # 3  EPICARDIAL WIRES:  YES                                                                         Day #  BOWEL MOVEMENT:  [X] YES [] NO, If No, Timing since last BM Day # 1        LABS:                        11.9<L>  10.36 )-----------( 123<L>    ( 24 May 2024 02:52 )             35.1<L>                        11.7<L>  11.51<H> )-----------( 118<L>    ( 23 May 2024 16:25 )             34.4<L>    05-24    138  |  102  |  17  ----------------------------<  122<H>  3.9   |  24  |  1.1  05-23    136  |  103  |  18  ----------------------------<  161<H>  4.4   |  25  |  1.1    Ca    8.4      24 May 2024 02:52  Mg     2.5     05-24    TPro  5.4<L> [6.0 - 8.0]  /  Alb  3.6 [3.5 - 5.2]  /  TBili  0.5 [0.2 - 1.2]  /  DBili  x   /  AST  23 [0 - 41]  /  ALT  14 [0 - 41]  /  AlkPhos  57 [30 - 115]  05-24    PT/INR - ( 23 May 2024 02:30 )   PT: ;   INR: 1.36 ratio         PTT - ( 23 May 2024 02:30 )  PTT:32.2 sec  Urinalysis Basic - ( 24 May 2024 02:52 )    Color: x / Appearance: x / SG: x / pH: x  Gluc: 122 mg/dL / Ketone: x  / Bili: x / Urobili: x   Blood: x / Protein: x / Nitrite: x   Leuk Esterase: x / RBC: x / WBC x   Sq Epi: x / Non Sq Epi: x / Bacteria: x      ABG - ( 22 May 2024 11:51 )  pH: 7.46  /  pCO2: 32    /  pO2: 95    / HCO3: 23    / Base Excess: -0.4  /  SaO2: 99.1  /  LA: 1.1              RADIOLOGY & ADDITIONAL TESTS:  CXR:   < from: Xray Chest 1 View- PORTABLE-Urgent (Xray Chest 1 View- PORTABLE-Urgent .) (05.23.24 @ 13:38) >  INTERPRETATION:  CLINICAL HISTORY: chest tube removed.    COMPARISON: Chest radiograph from earlier the same day.    TECHNIQUE:Portable frontal chest radiograph. Adequate positioning.    FINDINGS/  IMPRESSION:    Support devices: Stable right IJ introducer sheath.    Cardiac/mediastinum/hilum: Stable.    Lung parenchyma/Pleura: Stable left basilar opacity. No pneumothorax.    Skeleton/soft tissues: Stable.        EKG:  < from: 12 Lead ECG (05.23.24 @ 08:08) >  Ventricular Rate 78 BPM    Atrial Rate 78 BPM    P-R Interval 134 ms    QRS Duration 98 ms    Q-T Interval 358 ms    QTC Calculation(Bazett) 408 ms    P Axis 67 degrees    R Axis 25 degrees    T Axis -35 degrees    Diagnosis Line Normal sinus rhythm  Inferior infarct , age undetermined  T wave abnormality, consider lateral ischemia  Abnormal ECG          Allergies    No Known Allergies    Intolerances      MEDICATIONS  (STANDING):  acetaminophen   IVPB .. 1000 milliGRAM(s) IV Intermittent once  aspirin enteric coated 325 milliGRAM(s) Oral daily  atorvastatin 40 milliGRAM(s) Oral at bedtime  bisacodyl Suppository 10 milliGRAM(s) Rectal once  chlorhexidine 2% Cloths 1 Application(s) Topical daily  dextrose 10% Bolus 125 milliLiter(s) IV Bolus once  dextrose 5%. 1000 milliLiter(s) (100 mL/Hr) IV Continuous <Continuous>  dextrose 5%. 1000 milliLiter(s) (50 mL/Hr) IV Continuous <Continuous>  dextrose 50% Injectable 25 milliLiter(s) IV Push every 15 minutes  dextrose 50% Injectable 50 milliLiter(s) IV Push every 15 minutes  famotidine    Tablet 20 milliGRAM(s) Oral two times a day  glucagon  Injectable 1 milliGRAM(s) IntraMuscular once  heparin   Injectable 3000 Unit(s) SubCutaneous every 12 hours  insulin lispro (ADMELOG) corrective regimen sliding scale   SubCutaneous three times a day before meals  insulin lispro (ADMELOG) corrective regimen sliding scale   SubCutaneous at bedtime  meperidine     Injectable 25 milliGRAM(s) IV Push once  metoprolol tartrate 12.5 milliGRAM(s) Oral every 12 hours  polyethylene glycol 3350 17 Gram(s) Oral daily  senna 2 Tablet(s) Oral at bedtime  sodium chloride 0.9%. 1000 milliLiter(s) (20 mL/Hr) IV Continuous <Continuous>    MEDICATIONS  (PRN):  dextrose Oral Gel 15 Gram(s) Oral once PRN Blood Glucose LESS THAN 70 milliGRAM(s)/deciliter  fentaNYL    Injectable 25 MICROGram(s) IV Push every 4 hours PRN Severe Pain (7 - 10)  ketorolac   Injectable 15 milliGRAM(s) IV Push every 4 hours PRN Severe Pain (7 - 10)  ondansetron Injectable 4 milliGRAM(s) IV Push every 4 hours PRN Nausea and/or Vomiting  oxyCODONE    IR 10 milliGRAM(s) Oral every 4 hours PRN Severe Pain (7 - 10)  oxyCODONE    IR 5 milliGRAM(s) Oral every 4 hours PRN Moderate Pain (4 - 6)    Home Medications:  Aspir 81 oral delayed release tablet: 1 tab(s) orally once a day (17 May 2024 14:10)  Lipitor 20 mg oral tablet: 1 tab(s) orally once a day (17 May 2024 14:10)  losartan 50 mg oral tablet: 1 tab(s) orally once a day (17 May 2024 14:10)  Metoprolol Tartrate 25 mg oral tablet: 1 tab(s) orally 2 times a day (17 May 2024 14:10)      Pharmacologic DVT Prophylaxis [X] YES, [] NO: Contraindication:  SCD's: YES b/l    GI Prophylaxis: pepcid    Post-Op Beta-Blockers: [X] Yes, [] No: contraindication:  Post-Op Aspirin:  [X] Yes, [] No: contraindication:  Post-Op Statin:  [X] Yes, [] No: contraindication:    Ambulation/Activity Status: ambulate as tolerated    Assessment/Plan:  63y Male status-post CABG x4 POD #3  - Case and plan discussed with CTU Intensivist and CT Surgeon - Dr. Rodriguez/Tamika/José/Raghav  - Continue CTU supportive care and ongoing plan of care as per continuing CTU rounds.   - Continue DVT/GI prophylaxis  - Incentive Spirometry 10 times an hour  - Continue to advance physical activity as tolerated and continue PT/OT as directed  1. CAD s/p CABG: Continue ASA, statin, BB, pain control as needed  2. HTN: cont to monitor, start low dose bb   3. Post-op A. Fib ppx: monitor electrolytes, bb started   4. COPD/Hypoxia: cont nebs, wean o2 as tolerated, encourage incentive spirometry, lasix for noncardiogenic fluid overload   5. DM/Glucose Control: insulin sliding scale   - d/c [facility] Disposition: rehab    OPERATIVE PROCEDURE(s): CABG x4               POD # 3                       SURGEON(s): MIGUEL Kumar      SUBJECTIVE ASSESSMENT:63yMale patient seen and examined at bedside. No complaints at this time    Vital Signs Last 24 Hrs  T(F): 98.6 (24 May 2024 00:00), Max: 99.7 (23 May 2024 09:00)  HR: 80 (24 May 2024 08:00) (76 - 94)  BP: 111/70 (24 May 2024 07:00) (102/60 - 128/74)  BP(mean): 85 (24 May 2024 07:00) (75 - 94)  RR: 26 (24 May 2024 08:00) (15 - 33)  SpO2: 98% (24 May 2024 08:00) (94% - 100%)      I&O's Detail    23 May 2024 07:01  -  24 May 2024 07:00  --------------------------------------------------------  IN:    IV PiggyBack: 200 mL    Oral Fluid: 1487 mL  Total IN: 1687 mL    OUT:    Amiodarone: 0 mL    Chest Tube (mL): 0 mL    Chest Tube (mL): 110 mL    DOBUTamine: 0 mL    Indwelling Catheter - Urethral (mL): 1440 mL    Norepinephrine: 0 mL    sodium chloride 0.9%: 0 mL    Voided (mL): 150 mL  Total OUT: 1700 mL        Net: I&O's Detail    22 May 2024 07:01  -  23 May 2024 07:00  --------------------------------------------------------  Total NET: -219.6 mL      23 May 2024 07:01  -  24 May 2024 07:00  --------------------------------------------------------  Total NET: -13 mL        CAPILLARY BLOOD GLUCOSE      POCT Blood Glucose.: 116 mg/dL (24 May 2024 06:48)  POCT Blood Glucose.: 136 mg/dL (23 May 2024 21:46)  POCT Blood Glucose.: 163 mg/dL (23 May 2024 16:12)  POCT Blood Glucose.: 137 mg/dL (23 May 2024 11:12)    A1C with Estimated Average Glucose Result: 6.0 % (05-18-24 @ 08:08)      Physical Exam:  General: NAD; A&Ox3  Neuro: pupils equal and reactive, speech clear, no overt sensory or motor deficits  Cardiac: S1/S2, RRR, no murmur, no rubs  Lungs: unlabored shallow respirations, decreased in bilateral bases  Abdomen: Soft/NT/ND; positive bowel sounds x 4  Sternum: Intact, no click, incision healing well with no drainage  Incisions: Incisions clean/dry/intact  Extremities: Mild edema b/l lower extremities; good capillary refill; no cyanosis; palpable 1+ pedal pulses b/l    Central Venous Catheter: Yes: critical illness, intravenous access  Day # 3  EPICARDIAL WIRES:  YES                                                                         Day #  BOWEL MOVEMENT:  [X] YES [] NO, If No, Timing since last BM Day # 1        LABS:                        11.9<L>  10.36 )-----------( 123<L>    ( 24 May 2024 02:52 )             35.1<L>                        11.7<L>  11.51<H> )-----------( 118<L>    ( 23 May 2024 16:25 )             34.4<L>    05-24    138  |  102  |  17  ----------------------------<  122<H>  3.9   |  24  |  1.1  05-23    136  |  103  |  18  ----------------------------<  161<H>  4.4   |  25  |  1.1    Ca    8.4      24 May 2024 02:52  Mg     2.5     05-24    TPro  5.4<L> [6.0 - 8.0]  /  Alb  3.6 [3.5 - 5.2]  /  TBili  0.5 [0.2 - 1.2]  /  DBili  x   /  AST  23 [0 - 41]  /  ALT  14 [0 - 41]  /  AlkPhos  57 [30 - 115]  05-24    PT/INR - ( 23 May 2024 02:30 )   PT: ;   INR: 1.36 ratio         PTT - ( 23 May 2024 02:30 )  PTT:32.2 sec  Urinalysis Basic - ( 24 May 2024 02:52 )    Color: x / Appearance: x / SG: x / pH: x  Gluc: 122 mg/dL / Ketone: x  / Bili: x / Urobili: x   Blood: x / Protein: x / Nitrite: x   Leuk Esterase: x / RBC: x / WBC x   Sq Epi: x / Non Sq Epi: x / Bacteria: x      ABG - ( 22 May 2024 11:51 )  pH: 7.46  /  pCO2: 32    /  pO2: 95    / HCO3: 23    / Base Excess: -0.4  /  SaO2: 99.1  /  LA: 1.1              RADIOLOGY & ADDITIONAL TESTS:  CXR:   < from: Xray Chest 1 View- PORTABLE-Urgent (Xray Chest 1 View- PORTABLE-Urgent .) (05.23.24 @ 13:38) >  INTERPRETATION:  CLINICAL HISTORY: chest tube removed.    COMPARISON: Chest radiograph from earlier the same day.    TECHNIQUE:Portable frontal chest radiograph. Adequate positioning.    FINDINGS/  IMPRESSION:    Support devices: Stable right IJ introducer sheath.    Cardiac/mediastinum/hilum: Stable.    Lung parenchyma/Pleura: Stable left basilar opacity. No pneumothorax.    Skeleton/soft tissues: Stable.        EKG:  < from: 12 Lead ECG (05.23.24 @ 08:08) >  Ventricular Rate 78 BPM    Atrial Rate 78 BPM    P-R Interval 134 ms    QRS Duration 98 ms    Q-T Interval 358 ms    QTC Calculation(Bazett) 408 ms    P Axis 67 degrees    R Axis 25 degrees    T Axis -35 degrees    Diagnosis Line Normal sinus rhythm  Inferior infarct , age undetermined  T wave abnormality, consider lateral ischemia  Abnormal ECG          Allergies    No Known Allergies    Intolerances      MEDICATIONS  (STANDING):  acetaminophen   IVPB .. 1000 milliGRAM(s) IV Intermittent once  aspirin enteric coated 325 milliGRAM(s) Oral daily  atorvastatin 40 milliGRAM(s) Oral at bedtime  bisacodyl Suppository 10 milliGRAM(s) Rectal once  chlorhexidine 2% Cloths 1 Application(s) Topical daily  dextrose 10% Bolus 125 milliLiter(s) IV Bolus once  dextrose 5%. 1000 milliLiter(s) (100 mL/Hr) IV Continuous <Continuous>  dextrose 5%. 1000 milliLiter(s) (50 mL/Hr) IV Continuous <Continuous>  dextrose 50% Injectable 25 milliLiter(s) IV Push every 15 minutes  dextrose 50% Injectable 50 milliLiter(s) IV Push every 15 minutes  famotidine    Tablet 20 milliGRAM(s) Oral two times a day  glucagon  Injectable 1 milliGRAM(s) IntraMuscular once  heparin   Injectable 3000 Unit(s) SubCutaneous every 12 hours  insulin lispro (ADMELOG) corrective regimen sliding scale   SubCutaneous three times a day before meals  insulin lispro (ADMELOG) corrective regimen sliding scale   SubCutaneous at bedtime  meperidine     Injectable 25 milliGRAM(s) IV Push once  metoprolol tartrate 12.5 milliGRAM(s) Oral every 12 hours  polyethylene glycol 3350 17 Gram(s) Oral daily  senna 2 Tablet(s) Oral at bedtime  sodium chloride 0.9%. 1000 milliLiter(s) (20 mL/Hr) IV Continuous <Continuous>    MEDICATIONS  (PRN):  dextrose Oral Gel 15 Gram(s) Oral once PRN Blood Glucose LESS THAN 70 milliGRAM(s)/deciliter  fentaNYL    Injectable 25 MICROGram(s) IV Push every 4 hours PRN Severe Pain (7 - 10)  ketorolac   Injectable 15 milliGRAM(s) IV Push every 4 hours PRN Severe Pain (7 - 10)  ondansetron Injectable 4 milliGRAM(s) IV Push every 4 hours PRN Nausea and/or Vomiting  oxyCODONE    IR 10 milliGRAM(s) Oral every 4 hours PRN Severe Pain (7 - 10)  oxyCODONE    IR 5 milliGRAM(s) Oral every 4 hours PRN Moderate Pain (4 - 6)    Home Medications:  Aspir 81 oral delayed release tablet: 1 tab(s) orally once a day (17 May 2024 14:10)  Lipitor 20 mg oral tablet: 1 tab(s) orally once a day (17 May 2024 14:10)  losartan 50 mg oral tablet: 1 tab(s) orally once a day (17 May 2024 14:10)  Metoprolol Tartrate 25 mg oral tablet: 1 tab(s) orally 2 times a day (17 May 2024 14:10)      Pharmacologic DVT Prophylaxis [X] YES, [] NO: Contraindication:  SCD's: YES b/l    GI Prophylaxis: pepcid    Post-Op Beta-Blockers: [X] Yes, [] No: contraindication:  Post-Op Aspirin:  [X] Yes, [] No: contraindication:  Post-Op Statin:  [X] Yes, [] No: contraindication:    Ambulation/Activity Status: ambulate as tolerated    Assessment/Plan:  63y Male status-post CABG x4 POD #3  - Case and plan discussed with CTU Intensivist and CT Surgeon - Dr. Rodriguez/Tamika/José/Raghav  - Continue CTU supportive care and ongoing plan of care as per continuing CTU rounds.   - Continue DVT/GI prophylaxis  - Incentive Spirometry 10 times an hour  - Continue to advance physical activity as tolerated and continue PT/OT as directed  1. CAD s/p CABG: Continue ASA, statin, BB, pain control as needed  2. HTN: cont to monitor, start low dose bb   3. Post-op A. Fib ppx: monitor electrolytes, bb started   4. COPD/Hypoxia: cont nebs, wean o2 as tolerated, encourage incentive spirometry, lasix for noncardiogenic fluid overload   5. DM/Glucose Control: insulin sliding scale   - d/c [facility] Disposition: [facility] sent for rehab, awaiting authorization

## 2024-05-25 LAB
ALBUMIN SERPL ELPH-MCNC: 3.5 G/DL — SIGNIFICANT CHANGE UP (ref 3.5–5.2)
ALP SERPL-CCNC: 67 U/L — SIGNIFICANT CHANGE UP (ref 30–115)
ALT FLD-CCNC: 26 U/L — SIGNIFICANT CHANGE UP (ref 0–41)
ANION GAP SERPL CALC-SCNC: 10 MMOL/L — SIGNIFICANT CHANGE UP (ref 7–14)
ANION GAP SERPL CALC-SCNC: 11 MMOL/L — SIGNIFICANT CHANGE UP (ref 7–14)
AST SERPL-CCNC: 30 U/L — SIGNIFICANT CHANGE UP (ref 0–41)
BASOPHILS # BLD AUTO: 0.01 K/UL — SIGNIFICANT CHANGE UP (ref 0–0.2)
BASOPHILS NFR BLD AUTO: 0.1 % — SIGNIFICANT CHANGE UP (ref 0–1)
BILIRUB SERPL-MCNC: 0.5 MG/DL — SIGNIFICANT CHANGE UP (ref 0.2–1.2)
BUN SERPL-MCNC: 17 MG/DL — SIGNIFICANT CHANGE UP (ref 10–20)
BUN SERPL-MCNC: 21 MG/DL — HIGH (ref 10–20)
CALCIUM SERPL-MCNC: 8.5 MG/DL — SIGNIFICANT CHANGE UP (ref 8.4–10.4)
CALCIUM SERPL-MCNC: 8.5 MG/DL — SIGNIFICANT CHANGE UP (ref 8.4–10.5)
CHLORIDE SERPL-SCNC: 104 MMOL/L — SIGNIFICANT CHANGE UP (ref 98–110)
CHLORIDE SERPL-SCNC: 104 MMOL/L — SIGNIFICANT CHANGE UP (ref 98–110)
CO2 SERPL-SCNC: 24 MMOL/L — SIGNIFICANT CHANGE UP (ref 17–32)
CO2 SERPL-SCNC: 25 MMOL/L — SIGNIFICANT CHANGE UP (ref 17–32)
CREAT SERPL-MCNC: 1 MG/DL — SIGNIFICANT CHANGE UP (ref 0.7–1.5)
CREAT SERPL-MCNC: 1.2 MG/DL — SIGNIFICANT CHANGE UP (ref 0.7–1.5)
EGFR: 68 ML/MIN/1.73M2 — SIGNIFICANT CHANGE UP
EGFR: 85 ML/MIN/1.73M2 — SIGNIFICANT CHANGE UP
EOSINOPHIL # BLD AUTO: 0.15 K/UL — SIGNIFICANT CHANGE UP (ref 0–0.7)
EOSINOPHIL NFR BLD AUTO: 1.7 % — SIGNIFICANT CHANGE UP (ref 0–8)
GLUCOSE BLDC GLUCOMTR-MCNC: 102 MG/DL — HIGH (ref 70–99)
GLUCOSE BLDC GLUCOMTR-MCNC: 148 MG/DL — HIGH (ref 70–99)
GLUCOSE SERPL-MCNC: 114 MG/DL — HIGH (ref 70–99)
GLUCOSE SERPL-MCNC: 139 MG/DL — HIGH (ref 70–99)
HCT VFR BLD CALC: 34.7 % — LOW (ref 42–52)
HGB BLD-MCNC: 11.4 G/DL — LOW (ref 14–18)
IMM GRANULOCYTES NFR BLD AUTO: 0.3 % — SIGNIFICANT CHANGE UP (ref 0.1–0.3)
LYMPHOCYTES # BLD AUTO: 1.34 K/UL — SIGNIFICANT CHANGE UP (ref 1.2–3.4)
LYMPHOCYTES # BLD AUTO: 15.5 % — LOW (ref 20.5–51.1)
MAGNESIUM SERPL-MCNC: 2.1 MG/DL — SIGNIFICANT CHANGE UP (ref 1.8–2.4)
MCHC RBC-ENTMCNC: 28.5 PG — SIGNIFICANT CHANGE UP (ref 27–31)
MCHC RBC-ENTMCNC: 32.9 G/DL — SIGNIFICANT CHANGE UP (ref 32–37)
MCV RBC AUTO: 86.8 FL — SIGNIFICANT CHANGE UP (ref 80–94)
MONOCYTES # BLD AUTO: 0.81 K/UL — HIGH (ref 0.1–0.6)
MONOCYTES NFR BLD AUTO: 9.4 % — HIGH (ref 1.7–9.3)
NEUTROPHILS # BLD AUTO: 6.3 K/UL — SIGNIFICANT CHANGE UP (ref 1.4–6.5)
NEUTROPHILS NFR BLD AUTO: 73 % — SIGNIFICANT CHANGE UP (ref 42.2–75.2)
NRBC # BLD: 0 /100 WBCS — SIGNIFICANT CHANGE UP (ref 0–0)
PLATELET # BLD AUTO: 157 K/UL — SIGNIFICANT CHANGE UP (ref 130–400)
PMV BLD: 10.3 FL — SIGNIFICANT CHANGE UP (ref 7.4–10.4)
POTASSIUM SERPL-MCNC: 4 MMOL/L — SIGNIFICANT CHANGE UP (ref 3.5–5)
POTASSIUM SERPL-MCNC: 4.1 MMOL/L — SIGNIFICANT CHANGE UP (ref 3.5–5)
POTASSIUM SERPL-SCNC: 4 MMOL/L — SIGNIFICANT CHANGE UP (ref 3.5–5)
POTASSIUM SERPL-SCNC: 4.1 MMOL/L — SIGNIFICANT CHANGE UP (ref 3.5–5)
PROT SERPL-MCNC: 5.3 G/DL — LOW (ref 6–8)
RBC # BLD: 4 M/UL — LOW (ref 4.7–6.1)
RBC # FLD: 13.2 % — SIGNIFICANT CHANGE UP (ref 11.5–14.5)
SODIUM SERPL-SCNC: 139 MMOL/L — SIGNIFICANT CHANGE UP (ref 135–146)
SODIUM SERPL-SCNC: 139 MMOL/L — SIGNIFICANT CHANGE UP (ref 135–146)
WBC # BLD: 8.64 K/UL — SIGNIFICANT CHANGE UP (ref 4.8–10.8)
WBC # FLD AUTO: 8.64 K/UL — SIGNIFICANT CHANGE UP (ref 4.8–10.8)

## 2024-05-25 PROCEDURE — 99233 SBSQ HOSP IP/OBS HIGH 50: CPT

## 2024-05-25 PROCEDURE — 93010 ELECTROCARDIOGRAM REPORT: CPT

## 2024-05-25 PROCEDURE — 71046 X-RAY EXAM CHEST 2 VIEWS: CPT | Mod: 26

## 2024-05-25 RX ORDER — ACETAMINOPHEN 500 MG
1000 TABLET ORAL ONCE
Refills: 0 | Status: COMPLETED | OUTPATIENT
Start: 2024-05-27 | End: 2024-05-26

## 2024-05-25 RX ORDER — ACETAMINOPHEN 500 MG
1000 TABLET ORAL ONCE
Refills: 0 | Status: COMPLETED | OUTPATIENT
Start: 2024-05-26 | End: 2024-05-26

## 2024-05-25 RX ORDER — POTASSIUM CHLORIDE 20 MEQ
20 PACKET (EA) ORAL
Refills: 0 | Status: COMPLETED | OUTPATIENT
Start: 2024-05-25 | End: 2024-05-25

## 2024-05-25 RX ORDER — POTASSIUM CHLORIDE 20 MEQ
40 PACKET (EA) ORAL ONCE
Refills: 0 | Status: COMPLETED | OUTPATIENT
Start: 2024-05-25 | End: 2024-05-25

## 2024-05-25 RX ORDER — FUROSEMIDE 40 MG
20 TABLET ORAL EVERY 12 HOURS
Refills: 0 | Status: DISCONTINUED | OUTPATIENT
Start: 2024-05-25 | End: 2024-05-27

## 2024-05-25 RX ORDER — ACETAMINOPHEN 500 MG
1000 TABLET ORAL ONCE
Refills: 0 | Status: COMPLETED | OUTPATIENT
Start: 2024-05-27 | End: 2024-05-27

## 2024-05-25 RX ADMIN — Medication 12.5 MILLIGRAM(S): at 22:25

## 2024-05-25 RX ADMIN — Medication 20 MILLIEQUIVALENT(S): at 07:05

## 2024-05-25 RX ADMIN — CHLORHEXIDINE GLUCONATE 1 APPLICATION(S): 213 SOLUTION TOPICAL at 07:20

## 2024-05-25 RX ADMIN — ATORVASTATIN CALCIUM 40 MILLIGRAM(S): 80 TABLET, FILM COATED ORAL at 22:25

## 2024-05-25 RX ADMIN — Medication 12.5 MILLIGRAM(S): at 06:06

## 2024-05-25 RX ADMIN — Medication 400 MILLIGRAM(S): at 06:06

## 2024-05-25 RX ADMIN — FAMOTIDINE 20 MILLIGRAM(S): 10 INJECTION INTRAVENOUS at 17:14

## 2024-05-25 RX ADMIN — Medication 20 MILLIGRAM(S): at 07:38

## 2024-05-25 RX ADMIN — Medication 1000 MILLIGRAM(S): at 17:27

## 2024-05-25 RX ADMIN — Medication 20 MILLIEQUIVALENT(S): at 11:28

## 2024-05-25 RX ADMIN — Medication 400 MILLIGRAM(S): at 11:28

## 2024-05-25 RX ADMIN — Medication 1000 MILLIGRAM(S): at 00:45

## 2024-05-25 RX ADMIN — Medication 400 MILLIGRAM(S): at 00:38

## 2024-05-25 RX ADMIN — HEPARIN SODIUM 3000 UNIT(S): 5000 INJECTION INTRAVENOUS; SUBCUTANEOUS at 06:06

## 2024-05-25 RX ADMIN — Medication 12.5 MILLIGRAM(S): at 15:46

## 2024-05-25 RX ADMIN — HEPARIN SODIUM 3000 UNIT(S): 5000 INJECTION INTRAVENOUS; SUBCUTANEOUS at 17:15

## 2024-05-25 RX ADMIN — FAMOTIDINE 20 MILLIGRAM(S): 10 INJECTION INTRAVENOUS at 06:06

## 2024-05-25 RX ADMIN — Medication 1000 MILLIGRAM(S): at 07:20

## 2024-05-25 RX ADMIN — Medication 20 MILLIGRAM(S): at 17:15

## 2024-05-25 RX ADMIN — Medication 325 MILLIGRAM(S): at 11:28

## 2024-05-25 RX ADMIN — Medication 40 MILLIEQUIVALENT(S): at 17:50

## 2024-05-25 RX ADMIN — Medication 1000 MILLIGRAM(S): at 11:15

## 2024-05-25 RX ADMIN — Medication 400 MILLIGRAM(S): at 17:15

## 2024-05-25 RX ADMIN — Medication 20 MILLIEQUIVALENT(S): at 06:06

## 2024-05-25 NOTE — PROGRESS NOTE ADULT - SUBJECTIVE AND OBJECTIVE BOX
AVIVA HOFFMAN  MRN#: [id number]  Subjective:  Patient was seen and evalauted on AM rounds offerring no specific compliants at this time.    OBJECTIVE:  ICU Vital Signs Last 24 Hrs  T(C): 36.2 (25 May 2024 08:00), Max: 37.1 (24 May 2024 20:00)  T(F): 97.2 (25 May 2024 08:00), Max: 98.8 (24 May 2024 20:00)  HR: 78 (25 May 2024 08:00) (76 - 93)  BP: 150/81 (25 May 2024 06:00) (92/60 - 150/81)  BP(mean): 106 (25 May 2024 06:00) (72 - 106)  ABP: --  ABP(mean): --  RR: 27 (25 May 2024 08:00) (21 - 41)  SpO2: 98% (25 May 2024 08:00) (94% - 98%)    O2 Parameters below as of 25 May 2024 08:00  Patient On (Oxygen Delivery Method): room air             @ 07:  -   @ 07:00  --------------------------------------------------------  IN: 2107 mL / OUT: 1746 mL / NET: 361 mL    05- @ 07:01  -   @ 10:25  --------------------------------------------------------  IN: 240 mL / OUT: 380 mL / NET: -140 mL      CAPILLARY BLOOD GLUCOSE      POCT Blood Glucose.: 102 mg/dL (25 May 2024 07:19)      PHYSICAL EXAM:Daily     Daily Weight in k.3 (25 May 2024 06:00)  General: WN/WD NAD    HEENT:     + NCAT  + EOMI  - Conjuctival edema   - Icterus   - Thrush   - ETT  - NGT/OGT    Neck:         + FROM    - JVD     - Nodes     - Masses    + Mid-line trachea   - Tracheostomy    Chest:         - Sternal click  - Sternal drainage  + Pacing wires  - Chest tubes  - SubQ emphysema    Lungs:          + CTA   - Rhonchi    - Rales    - Wheezing     - Decreased BS   - Dullness R L    Cardiac:       + S1 + S2    + RRR   - Irregular   - S3  - S4    - Murmurs   - Rub   - Hamman’s sign     Abdomen:    + BS     + Soft    + Non-tender     - Distended    - Organomegaly  - PEG    Extremities:   - Cyanosis U/L   - Clubbing  U/L  - LE/UE Edema   + Capillary refill    + Pulses     Neuro:        + Awake   +  Alert   - Confused   - Lethargic   - Sedated   - Generalized Weakness    Skin:        - Rashes    - Erythema   + Normal incisions   + IV sites intact  - Sacral decubitus    HOSPITAL MEDICATIONS:  MEDICATIONS  (STANDING):  acetaminophen   IVPB .. 1000 milliGRAM(s) IV Intermittent once  acetaminophen   IVPB .. 1000 milliGRAM(s) IV Intermittent once  aspirin enteric coated 325 milliGRAM(s) Oral daily  atorvastatin 40 milliGRAM(s) Oral at bedtime  bisacodyl Suppository 10 milliGRAM(s) Rectal once  chlorhexidine 2% Cloths 1 Application(s) Topical daily  dextrose 10% Bolus 125 milliLiter(s) IV Bolus once  dextrose 5%. 1000 milliLiter(s) (50 mL/Hr) IV Continuous <Continuous>  dextrose 5%. 1000 milliLiter(s) (100 mL/Hr) IV Continuous <Continuous>  dextrose 50% Injectable 50 milliLiter(s) IV Push every 15 minutes  dextrose 50% Injectable 25 milliLiter(s) IV Push every 15 minutes  famotidine    Tablet 20 milliGRAM(s) Oral two times a day  furosemide   Injectable 20 milliGRAM(s) IV Push every 12 hours  glucagon  Injectable 1 milliGRAM(s) IntraMuscular once  heparin   Injectable 3000 Unit(s) SubCutaneous every 12 hours  insulin lispro (ADMELOG) corrective regimen sliding scale   SubCutaneous three times a day before meals  insulin lispro (ADMELOG) corrective regimen sliding scale   SubCutaneous at bedtime  meperidine     Injectable 25 milliGRAM(s) IV Push once  metoprolol tartrate 12.5 milliGRAM(s) Oral every 8 hours  polyethylene glycol 3350 17 Gram(s) Oral daily  potassium chloride    Tablet ER 20 milliEquivalent(s) Oral daily  senna 2 Tablet(s) Oral at bedtime    MEDICATIONS  (PRN):  dextrose Oral Gel 15 Gram(s) Oral once PRN Blood Glucose LESS THAN 70 milliGRAM(s)/deciliter  fentaNYL    Injectable 25 MICROGram(s) IV Push every 4 hours PRN Severe Pain (7 - 10)  ketorolac   Injectable 15 milliGRAM(s) IV Push every 4 hours PRN Severe Pain (7 - 10)  ondansetron Injectable 4 milliGRAM(s) IV Push every 4 hours PRN Nausea and/or Vomiting  oxyCODONE    IR 10 milliGRAM(s) Oral every 4 hours PRN Severe Pain (7 - 10)  oxyCODONE    IR 5 milliGRAM(s) Oral every 4 hours PRN Moderate Pain (4 - 6)      LABS:                        11.4   8.64  )-----------( 157      ( 25 May 2024 02:28 )             34.7    05-    139  |  104  |  17  ----------------------------<  114<H>  4.0   |  25  |  1.0    Ca    8.5      25 May 2024 02:28  Mg     2.1         TPro  5.3<L>  /  Alb  3.5  /  TBili  0.5  /  DBili  x   /  AST  30  /  ALT  26  /  AlkPhos  67  05-25     LIVER FUNCTIONS - ( 25 May 2024 02:28 )  Alb: 3.5 g/dL / Pro: 5.3 g/dL / ALK PHOS: 67 U/L / ALT: 26 U/L / AST: 30 U/L / GGT: x           Urinalysis Basic - ( 25 May 2024 02:28 )    Color: x / Appearance: x / SG: x / pH: x  Gluc: 114 mg/dL / Ketone: x  / Bili: x / Urobili: x   Blood: x / Protein: x / Nitrite: x   Leuk Esterase: x / RBC: x / WBC x   Sq Epi: x / Non Sq Epi: x / Bacteria: x        RADIOLOGY:  X Reviewed and interpreted by me: L-base opacity, bilateral pleural effusions    CARDIOPULMONARY DYSFUNCTION  - Respiratory status required supplemental oxygen & the following of continuous pulse oximetry for support & to prevent decompensation  - Continued early mobilization as tolerated  - Addressed analgesic regimen to optimize function    PREVENTION-PROPHYLAXIS  - ASA continued for graft occlusion-thromboembolism prophylaxis  - Lipitor was also started for long term graft patency  - Heparin continued for VTE prophylaxis in addition to Venodyne boots  - Pepcid maintained for GI bleeding prophylaxis  - Lopressor continued for atrial fibrillation prophylaxis  - Metabolic stability & infection prophylaxis required review and adjustment of regular Insulin sliding scale and gylcemic regimen while following serial glucose levels to help achieve & maintain euglycemia  - Reviewed & addressed surgical site infection prophylaxis regimen

## 2024-05-25 NOTE — PROGRESS NOTE ADULT - SUBJECTIVE AND OBJECTIVE BOX
OPERATIVE PROCEDURE(s): CABG x4                POD #  4                     SURGEON(s): MIGUEL Kumar      SUBJECTIVE ASSESSMENT:63yMale patient seen and examined at bedside. Complains of mild inicisional soreness    Vital Signs Last 24 Hrs  T(F): 98.6 (25 May 2024 05:00), Max: 98.8 (24 May 2024 20:00)  HR: 86 (25 May 2024 05:00) (76 - 93)  BP: 134/79 (25 May 2024 05:00) (92/60 - 134/79)  BP(mean): 101 (25 May 2024 05:00) (72 - 102)  RR: 26 (25 May 2024 05:00) (19 - 41)  SpO2: 95% (25 May 2024 05:00) (94% - 98%)      I&O's Detail    24 May 2024 07:01  -  25 May 2024 07:00  --------------------------------------------------------  IN:    IV PiggyBack: 400 mL    Oral Fluid: 1707 mL  Total IN: 2107 mL    OUT:    Voided (mL): 1745 mL  Total OUT: 1745 mL        Net: I&O's Detail    23 May 2024 07:01  -  24 May 2024 07:00  --------------------------------------------------------  Total NET: -13 mL      24 May 2024 07:01  -  25 May 2024 07:00  --------------------------------------------------------  Total NET: 362 mL        CAPILLARY BLOOD GLUCOSE      POCT Blood Glucose.: 105 mg/dL (24 May 2024 21:52)  POCT Blood Glucose.: 148 mg/dL (24 May 2024 15:35)  POCT Blood Glucose.: 102 mg/dL (24 May 2024 11:40)    A1C with Estimated Average Glucose Result: 6.0 % (05-18-24 @ 08:08)      Physical Exam:  General: NAD; A&Ox3  Neuro: pupils equal and reactive, speech clear, no overt sensory or motor deficits  Cardiac: S1/S2, RRR, no murmur, no rubs  Lungs: unlabored shallow respirations, decreased in bilateral bases  Abdomen: Soft/NT/ND; positive bowel sounds x 4  Sternum: Intact, no click, incision healing well with no drainage  Incisions: Incisions clean/dry/intact  Extremities: Mild edema b/l lower extremities; good capillary refill; no cyanosis; palpable 1+ pedal pulses b/l    Central Venous Catheter: Yes: critical illness, intravenous access  Day #  Zafar Catheter: Yes: critical illness; monitor strict i/o's                    Day #  EPICARDIAL WIRES:  YES                                                                         Day #  BOWEL MOVEMENT:  [] YES [] NO, If No, Timing since last BM Day #  CHEST TUBE(MS/Left/Right):  [] YES [] NO, If yes -  AIR LEAKS:  YES/NO        LABS:                        11.4<L>  8.64  )-----------( 157      ( 25 May 2024 02:28 )             34.7<L>                        11.9<L>  10.36 )-----------( 123<L>    ( 24 May 2024 02:52 )             35.1<L>    05-25    139  |  104  |  17  ----------------------------<  114<H>  4.0   |  25  |  1.0  05-24    138  |  103  |  19  ----------------------------<  168<H>  4.3   |  25  |  1.2    Ca    8.5      25 May 2024 02:28  Mg     2.1     05-25    TPro  5.3<L> [6.0 - 8.0]  /  Alb  3.5 [3.5 - 5.2]  /  TBili  0.5 [0.2 - 1.2]  /  DBili  x   /  AST  30 [0 - 41]  /  ALT  26 [0 - 41]  /  AlkPhos  67 [30 - 115]  05-25      Urinalysis Basic - ( 25 May 2024 02:28 )    Color: x / Appearance: x / SG: x / pH: x  Gluc: 114 mg/dL / Ketone: x  / Bili: x / Urobili: x   Blood: x / Protein: x / Nitrite: x   Leuk Esterase: x / RBC: x / WBC x   Sq Epi: x / Non Sq Epi: x / Bacteria: x        RADIOLOGY & ADDITIONAL TESTS:  CXR:   < from: Xray Chest 1 View- PORTABLE-Routine (05.24.24 @ 06:42) >  INTERPRETATION:  Clinical History / Reason for exam: Post cardiac surgery.    Comparison : Chest radiograph dated May 23, 2024.    Technique/Positioning: Portable frontal.    Findings:    Support devices: Right IJ introducer sheath in stable position. Overlying   EKG leads.    Cardiac/mediastinum/hilum: Stable.    Lung parenchyma/Pleura: Unchanged small left basilar opacity/atelectasis.   No pneumothorax.    Skeleton/soft tissues: Stable.    Impression:    1. Unchanged small bibasilar opacities/atelectasis.        EKG:  < from: 12 Lead ECG (05.24.24 @ 07:40) >    Ventricular Rate 83 BPM    Atrial Rate 83 BPM    P-R Interval 130 ms    QRS Duration 102 ms    Q-T Interval 350 ms    QTC Calculation(Bazett) 411 ms    P Axis 44 degrees    R Axis 32 degrees    T Axis -41 degrees    Diagnosis Line Normal sinus rhythm  Possible Inferior infarct , age undetermined  T wave abnormality, consider anterolateral ischemia  Abnormal ECG      Allergies    No Known Allergies    Intolerances      MEDICATIONS  (STANDING):  acetaminophen   IVPB .. 1000 milliGRAM(s) IV Intermittent once  acetaminophen   IVPB .. 1000 milliGRAM(s) IV Intermittent once  aspirin enteric coated 325 milliGRAM(s) Oral daily  atorvastatin 40 milliGRAM(s) Oral at bedtime  bisacodyl Suppository 10 milliGRAM(s) Rectal once  chlorhexidine 2% Cloths 1 Application(s) Topical daily  dextrose 10% Bolus 125 milliLiter(s) IV Bolus once  dextrose 5%. 1000 milliLiter(s) (50 mL/Hr) IV Continuous <Continuous>  dextrose 5%. 1000 milliLiter(s) (100 mL/Hr) IV Continuous <Continuous>  dextrose 50% Injectable 50 milliLiter(s) IV Push every 15 minutes  dextrose 50% Injectable 25 milliLiter(s) IV Push every 15 minutes  famotidine    Tablet 20 milliGRAM(s) Oral two times a day  furosemide   Injectable 20 milliGRAM(s) IV Push every 24 hours  glucagon  Injectable 1 milliGRAM(s) IntraMuscular once  heparin   Injectable 3000 Unit(s) SubCutaneous every 12 hours  insulin lispro (ADMELOG) corrective regimen sliding scale   SubCutaneous three times a day before meals  insulin lispro (ADMELOG) corrective regimen sliding scale   SubCutaneous at bedtime  meperidine     Injectable 25 milliGRAM(s) IV Push once  metoprolol tartrate 12.5 milliGRAM(s) Oral every 8 hours  polyethylene glycol 3350 17 Gram(s) Oral daily  potassium chloride    Tablet ER 20 milliEquivalent(s) Oral daily  potassium chloride    Tablet ER 20 milliEquivalent(s) Oral every 2 hours  senna 2 Tablet(s) Oral at bedtime    MEDICATIONS  (PRN):  dextrose Oral Gel 15 Gram(s) Oral once PRN Blood Glucose LESS THAN 70 milliGRAM(s)/deciliter  fentaNYL    Injectable 25 MICROGram(s) IV Push every 4 hours PRN Severe Pain (7 - 10)  ketorolac   Injectable 15 milliGRAM(s) IV Push every 4 hours PRN Severe Pain (7 - 10)  ondansetron Injectable 4 milliGRAM(s) IV Push every 4 hours PRN Nausea and/or Vomiting  oxyCODONE    IR 10 milliGRAM(s) Oral every 4 hours PRN Severe Pain (7 - 10)  oxyCODONE    IR 5 milliGRAM(s) Oral every 4 hours PRN Moderate Pain (4 - 6)    Home Medications:  Aspir 81 oral delayed release tablet: 1 tab(s) orally once a day (17 May 2024 14:10)  Lipitor 20 mg oral tablet: 1 tab(s) orally once a day (17 May 2024 14:10)  losartan 50 mg oral tablet: 1 tab(s) orally once a day (17 May 2024 14:10)  Metoprolol Tartrate 25 mg oral tablet: 1 tab(s) orally 2 times a day (17 May 2024 14:10)      Pharmacologic DVT Prophylaxis [X] YES, [] NO: Contraindication:  SCD's: YES b/l    GI Prophylaxis: pepcid    Post-Op Beta-Blockers: [X] Yes, [] No: contraindication:  Post-Op Aspirin:  [X] Yes, [] No: contraindication:  Post-Op Statin:  [X] Yes, [] No: contraindication:    Ambulation/Activity Status: ambulate as tolerated    Assessment/Plan:  63y Male status-post CABG x4 POD #4  - Case and plan discussed with CTU Intensivist and CT Surgeon - Dr. Rodriguez/Tamika/José/Raghav  - Continue CTU supportive care and ongoing plan of care as per continuing CTU rounds.   - Continue DVT/GI prophylaxis  - Incentive Spirometry 10 times an hour  - Continue to advance physical activity as tolerated and continue PT/OT as directed  1. CAD s/p CABG: Continue ASA, statin, BB, pain control as needed  2. HTN: cont to monitor, start low dose bb   3. Post-op A. Fib ppx: monitor electrolytes, bb started   4. COPD/Hypoxia: cont nebs, wean o2 as tolerated, encourage incentive spirometry, lasix for noncardiogenic fluid overload   5. DM/Glucose Control: insulin sliding scale     Social Service Disposition: [facility] sent for rehab, awaiting authorization      Social Service Disposition:     OPERATIVE PROCEDURE(s): CABG x4                POD #  4                     SURGEON(s): MIGUEL Kumar      SUBJECTIVE ASSESSMENT:63yMale patient seen and examined at bedside. Complains of mild inicisional soreness    Vital Signs Last 24 Hrs  T(F): 98.6 (25 May 2024 05:00), Max: 98.8 (24 May 2024 20:00)  HR: 86 (25 May 2024 05:00) (76 - 93)  BP: 134/79 (25 May 2024 05:00) (92/60 - 134/79)  BP(mean): 101 (25 May 2024 05:00) (72 - 102)  RR: 26 (25 May 2024 05:00) (19 - 41)  SpO2: 95% (25 May 2024 05:00) (94% - 98%)      I&O's Detail    24 May 2024 07:01  -  25 May 2024 07:00  --------------------------------------------------------  IN:    IV PiggyBack: 400 mL    Oral Fluid: 1707 mL  Total IN: 2107 mL    OUT:    Voided (mL): 1745 mL  Total OUT: 1745 mL        Net: I&O's Detail    23 May 2024 07:01  -  24 May 2024 07:00  --------------------------------------------------------  Total NET: -13 mL      24 May 2024 07:01  -  25 May 2024 07:00  --------------------------------------------------------  Total NET: 362 mL        CAPILLARY BLOOD GLUCOSE      POCT Blood Glucose.: 105 mg/dL (24 May 2024 21:52)  POCT Blood Glucose.: 148 mg/dL (24 May 2024 15:35)  POCT Blood Glucose.: 102 mg/dL (24 May 2024 11:40)    A1C with Estimated Average Glucose Result: 6.0 % (05-18-24 @ 08:08)      Physical Exam:  General: NAD; A&Ox3  Neuro: pupils equal and reactive, speech clear, no overt sensory or motor deficits  Cardiac: S1/S2, RRR, no murmur, no rubs  Lungs: unlabored shallow respirations, decreased in bilateral bases  Abdomen: Soft/NT/ND; positive bowel sounds x 4  Sternum: Intact, no click, incision healing well with no drainage  Incisions: Incisions clean/dry/intact  Extremities: Mild edema b/l lower extremities; good capillary refill; no cyanosis; palpable 1+ pedal pulses b/l    Central Venous Catheter: Yes: critical illness, intravenous access  Day #  Zafar Catheter: Yes: critical illness; monitor strict i/o's                    Day #  EPICARDIAL WIRES:  YES                                                                         Day #  BOWEL MOVEMENT:  [] YES [] NO, If No, Timing since last BM Day #  CHEST TUBE(MS/Left/Right):  [] YES [] NO, If yes -  AIR LEAKS:  YES/NO        LABS:                        11.4<L>  8.64  )-----------( 157      ( 25 May 2024 02:28 )             34.7<L>                        11.9<L>  10.36 )-----------( 123<L>    ( 24 May 2024 02:52 )             35.1<L>    05-25    139  |  104  |  17  ----------------------------<  114<H>  4.0   |  25  |  1.0  05-24    138  |  103  |  19  ----------------------------<  168<H>  4.3   |  25  |  1.2    Ca    8.5      25 May 2024 02:28  Mg     2.1     05-25    TPro  5.3<L> [6.0 - 8.0]  /  Alb  3.5 [3.5 - 5.2]  /  TBili  0.5 [0.2 - 1.2]  /  DBili  x   /  AST  30 [0 - 41]  /  ALT  26 [0 - 41]  /  AlkPhos  67 [30 - 115]  05-25      Urinalysis Basic - ( 25 May 2024 02:28 )    Color: x / Appearance: x / SG: x / pH: x  Gluc: 114 mg/dL / Ketone: x  / Bili: x / Urobili: x   Blood: x / Protein: x / Nitrite: x   Leuk Esterase: x / RBC: x / WBC x   Sq Epi: x / Non Sq Epi: x / Bacteria: x        RADIOLOGY & ADDITIONAL TESTS:  CXR:   < from: Xray Chest 1 View- PORTABLE-Routine (05.24.24 @ 06:42) >  INTERPRETATION:  Clinical History / Reason for exam: Post cardiac surgery.    Comparison : Chest radiograph dated May 23, 2024.    Technique/Positioning: Portable frontal.    Findings:    Support devices: Right IJ introducer sheath in stable position. Overlying   EKG leads.    Cardiac/mediastinum/hilum: Stable.    Lung parenchyma/Pleura: Unchanged small left basilar opacity/atelectasis.   No pneumothorax.    Skeleton/soft tissues: Stable.    Impression:    1. Unchanged small bibasilar opacities/atelectasis.        EKG:  < from: 12 Lead ECG (05.24.24 @ 07:40) >    Ventricular Rate 83 BPM    Atrial Rate 83 BPM    P-R Interval 130 ms    QRS Duration 102 ms    Q-T Interval 350 ms    QTC Calculation(Bazett) 411 ms    P Axis 44 degrees    R Axis 32 degrees    T Axis -41 degrees    Diagnosis Line Normal sinus rhythm  Possible Inferior infarct , age undetermined  T wave abnormality, consider anterolateral ischemia  Abnormal ECG      Allergies    No Known Allergies    Intolerances      MEDICATIONS  (STANDING):  acetaminophen   IVPB .. 1000 milliGRAM(s) IV Intermittent once  acetaminophen   IVPB .. 1000 milliGRAM(s) IV Intermittent once  aspirin enteric coated 325 milliGRAM(s) Oral daily  atorvastatin 40 milliGRAM(s) Oral at bedtime  bisacodyl Suppository 10 milliGRAM(s) Rectal once  chlorhexidine 2% Cloths 1 Application(s) Topical daily  dextrose 10% Bolus 125 milliLiter(s) IV Bolus once  dextrose 5%. 1000 milliLiter(s) (50 mL/Hr) IV Continuous <Continuous>  dextrose 5%. 1000 milliLiter(s) (100 mL/Hr) IV Continuous <Continuous>  dextrose 50% Injectable 50 milliLiter(s) IV Push every 15 minutes  dextrose 50% Injectable 25 milliLiter(s) IV Push every 15 minutes  famotidine    Tablet 20 milliGRAM(s) Oral two times a day  furosemide   Injectable 20 milliGRAM(s) IV Push every 24 hours  glucagon  Injectable 1 milliGRAM(s) IntraMuscular once  heparin   Injectable 3000 Unit(s) SubCutaneous every 12 hours  insulin lispro (ADMELOG) corrective regimen sliding scale   SubCutaneous three times a day before meals  insulin lispro (ADMELOG) corrective regimen sliding scale   SubCutaneous at bedtime  meperidine     Injectable 25 milliGRAM(s) IV Push once  metoprolol tartrate 12.5 milliGRAM(s) Oral every 8 hours  polyethylene glycol 3350 17 Gram(s) Oral daily  potassium chloride    Tablet ER 20 milliEquivalent(s) Oral daily  potassium chloride    Tablet ER 20 milliEquivalent(s) Oral every 2 hours  senna 2 Tablet(s) Oral at bedtime    MEDICATIONS  (PRN):  dextrose Oral Gel 15 Gram(s) Oral once PRN Blood Glucose LESS THAN 70 milliGRAM(s)/deciliter  fentaNYL    Injectable 25 MICROGram(s) IV Push every 4 hours PRN Severe Pain (7 - 10)  ketorolac   Injectable 15 milliGRAM(s) IV Push every 4 hours PRN Severe Pain (7 - 10)  ondansetron Injectable 4 milliGRAM(s) IV Push every 4 hours PRN Nausea and/or Vomiting  oxyCODONE    IR 10 milliGRAM(s) Oral every 4 hours PRN Severe Pain (7 - 10)  oxyCODONE    IR 5 milliGRAM(s) Oral every 4 hours PRN Moderate Pain (4 - 6)    Home Medications:  Aspir 81 oral delayed release tablet: 1 tab(s) orally once a day (17 May 2024 14:10)  Lipitor 20 mg oral tablet: 1 tab(s) orally once a day (17 May 2024 14:10)  losartan 50 mg oral tablet: 1 tab(s) orally once a day (17 May 2024 14:10)  Metoprolol Tartrate 25 mg oral tablet: 1 tab(s) orally 2 times a day (17 May 2024 14:10)      Pharmacologic DVT Prophylaxis [X] YES, [] NO: Contraindication:  SCD's: YES b/l    GI Prophylaxis: pepcid    Post-Op Beta-Blockers: [X] Yes, [] No: contraindication:  Post-Op Aspirin:  [X] Yes, [] No: contraindication:  Post-Op Statin:  [X] Yes, [] No: contraindication:    Ambulation/Activity Status: ambulate as tolerated    Assessment/Plan:  63y Male status-post CABG x4 POD #4  - Case and plan discussed with CTU Intensivist and CT Surgeon - Dr. Rodriguez/Tamika/José/Raghav  - Continue CTU supportive care and ongoing plan of care as per continuing CTU rounds.   - Continue DVT/GI prophylaxis  - Incentive Spirometry 10 times an hour  - Continue to advance physical activity as tolerated and continue PT/OT as directed  1. CAD s/p CABG: Continue ASA, statin, BB, pain control as needed  2. HTN: cont to monitor, start low dose bb   3. Post-op A. Fib ppx: monitor electrolytes, bb started   4. COPD/Hypoxia: cont nebs, wean o2 as tolerated, encourage incentive spirometry, lasix for noncardiogenic fluid overload   5. DM/Glucose Control: insulin sliding scale     Social Service Disposition: [facility] sent for rehab, awaiting authorization OPERATIVE PROCEDURE(s): CABG x4                POD #  4                     SURGEON(s): MIGUEL Kumar      SUBJECTIVE ASSESSMENT:63yMale patient seen and examined at bedside. Complains of incisional pain    Vital Signs Last 24 Hrs  T(F): 98.6 (25 May 2024 05:00), Max: 98.8 (24 May 2024 20:00)  HR: 86 (25 May 2024 05:00) (76 - 93)  BP: 134/79 (25 May 2024 05:00) (92/60 - 134/79)  BP(mean): 101 (25 May 2024 05:00) (72 - 102)  RR: 26 (25 May 2024 05:00) (19 - 41)  SpO2: 95% (25 May 2024 05:00) (94% - 98%)      I&O's Detail    24 May 2024 07:01  -  25 May 2024 07:00  --------------------------------------------------------  IN:    IV PiggyBack: 400 mL    Oral Fluid: 1707 mL  Total IN: 2107 mL    OUT:    Voided (mL): 1745 mL  Total OUT: 1745 mL        Net: I&O's Detail    23 May 2024 07:01  -  24 May 2024 07:00  --------------------------------------------------------  Total NET: -13 mL      24 May 2024 07:01  -  25 May 2024 07:00  --------------------------------------------------------  Total NET: 362 mL        CAPILLARY BLOOD GLUCOSE      POCT Blood Glucose.: 105 mg/dL (24 May 2024 21:52)  POCT Blood Glucose.: 148 mg/dL (24 May 2024 15:35)  POCT Blood Glucose.: 102 mg/dL (24 May 2024 11:40)    A1C with Estimated Average Glucose Result: 6.0 % (05-18-24 @ 08:08)      Physical Exam:  General: NAD; A&Ox3  Neuro: pupils equal and reactive, speech clear, no overt sensory or motor deficits  Cardiac: S1/S2, RRR, no murmur, no rubs  Lungs: unlabored shallow respirations, decreased in bilateral bases  Abdomen: Soft/NT/ND; positive bowel sounds x 4  Sternum: Intact, no click, incision healing well with no drainage  Incisions: Incisions clean/dry/intact  Extremities: Mild edema b/l lower extremities; good capillary refill; no cyanosis; palpable 1+ pedal pulses b/l    Central Venous Catheter: Yes: critical illness, intravenous access  Day # 4  EPICARDIAL WIRES:  YES                                                                         Day #4  BOWEL MOVEMENT:  [X] YES [] NO, If No, Timing since last BM Day #        LABS:                        11.4<L>  8.64  )-----------( 157      ( 25 May 2024 02:28 )             34.7<L>                        11.9<L>  10.36 )-----------( 123<L>    ( 24 May 2024 02:52 )             35.1<L>    05-25    139  |  104  |  17  ----------------------------<  114<H>  4.0   |  25  |  1.0  05-24    138  |  103  |  19  ----------------------------<  168<H>  4.3   |  25  |  1.2    Ca    8.5      25 May 2024 02:28  Mg     2.1     05-25    TPro  5.3<L> [6.0 - 8.0]  /  Alb  3.5 [3.5 - 5.2]  /  TBili  0.5 [0.2 - 1.2]  /  DBili  x   /  AST  30 [0 - 41]  /  ALT  26 [0 - 41]  /  AlkPhos  67 [30 - 115]  05-25      Urinalysis Basic - ( 25 May 2024 02:28 )    Color: x / Appearance: x / SG: x / pH: x  Gluc: 114 mg/dL / Ketone: x  / Bili: x / Urobili: x   Blood: x / Protein: x / Nitrite: x   Leuk Esterase: x / RBC: x / WBC x   Sq Epi: x / Non Sq Epi: x / Bacteria: x        RADIOLOGY & ADDITIONAL TESTS:  CXR:   < from: Xray Chest 1 View- PORTABLE-Routine (05.24.24 @ 06:42) >  INTERPRETATION:  Clinical History / Reason for exam: Post cardiac surgery.    Comparison : Chest radiograph dated May 23, 2024.    Technique/Positioning: Portable frontal.    Findings:    Support devices: Right IJ introducer sheath in stable position. Overlying   EKG leads.    Cardiac/mediastinum/hilum: Stable.    Lung parenchyma/Pleura: Unchanged small left basilar opacity/atelectasis.   No pneumothorax.    Skeleton/soft tissues: Stable.    Impression:    1. Unchanged small bibasilar opacities/atelectasis.        EKG:  < from: 12 Lead ECG (05.24.24 @ 07:40) >    Ventricular Rate 83 BPM    Atrial Rate 83 BPM    P-R Interval 130 ms    QRS Duration 102 ms    Q-T Interval 350 ms    QTC Calculation(Bazett) 411 ms    P Axis 44 degrees    R Axis 32 degrees    T Axis -41 degrees    Diagnosis Line Normal sinus rhythm  Possible Inferior infarct , age undetermined  T wave abnormality, consider anterolateral ischemia  Abnormal ECG      Allergies    No Known Allergies    Intolerances      MEDICATIONS  (STANDING):  acetaminophen   IVPB .. 1000 milliGRAM(s) IV Intermittent once  acetaminophen   IVPB .. 1000 milliGRAM(s) IV Intermittent once  aspirin enteric coated 325 milliGRAM(s) Oral daily  atorvastatin 40 milliGRAM(s) Oral at bedtime  bisacodyl Suppository 10 milliGRAM(s) Rectal once  chlorhexidine 2% Cloths 1 Application(s) Topical daily  dextrose 10% Bolus 125 milliLiter(s) IV Bolus once  dextrose 5%. 1000 milliLiter(s) (50 mL/Hr) IV Continuous <Continuous>  dextrose 5%. 1000 milliLiter(s) (100 mL/Hr) IV Continuous <Continuous>  dextrose 50% Injectable 50 milliLiter(s) IV Push every 15 minutes  dextrose 50% Injectable 25 milliLiter(s) IV Push every 15 minutes  famotidine    Tablet 20 milliGRAM(s) Oral two times a day  furosemide   Injectable 20 milliGRAM(s) IV Push every 24 hours  glucagon  Injectable 1 milliGRAM(s) IntraMuscular once  heparin   Injectable 3000 Unit(s) SubCutaneous every 12 hours  insulin lispro (ADMELOG) corrective regimen sliding scale   SubCutaneous three times a day before meals  insulin lispro (ADMELOG) corrective regimen sliding scale   SubCutaneous at bedtime  meperidine     Injectable 25 milliGRAM(s) IV Push once  metoprolol tartrate 12.5 milliGRAM(s) Oral every 8 hours  polyethylene glycol 3350 17 Gram(s) Oral daily  potassium chloride    Tablet ER 20 milliEquivalent(s) Oral daily  potassium chloride    Tablet ER 20 milliEquivalent(s) Oral every 2 hours  senna 2 Tablet(s) Oral at bedtime    MEDICATIONS  (PRN):  dextrose Oral Gel 15 Gram(s) Oral once PRN Blood Glucose LESS THAN 70 milliGRAM(s)/deciliter  fentaNYL    Injectable 25 MICROGram(s) IV Push every 4 hours PRN Severe Pain (7 - 10)  ketorolac   Injectable 15 milliGRAM(s) IV Push every 4 hours PRN Severe Pain (7 - 10)  ondansetron Injectable 4 milliGRAM(s) IV Push every 4 hours PRN Nausea and/or Vomiting  oxyCODONE    IR 10 milliGRAM(s) Oral every 4 hours PRN Severe Pain (7 - 10)  oxyCODONE    IR 5 milliGRAM(s) Oral every 4 hours PRN Moderate Pain (4 - 6)    Home Medications:  Aspir 81 oral delayed release tablet: 1 tab(s) orally once a day (17 May 2024 14:10)  Lipitor 20 mg oral tablet: 1 tab(s) orally once a day (17 May 2024 14:10)  losartan 50 mg oral tablet: 1 tab(s) orally once a day (17 May 2024 14:10)  Metoprolol Tartrate 25 mg oral tablet: 1 tab(s) orally 2 times a day (17 May 2024 14:10)      Pharmacologic DVT Prophylaxis [X] YES, [] NO: Contraindication:  SCD's: YES b/l    GI Prophylaxis: pepcid    Post-Op Beta-Blockers: [X] Yes, [] No: contraindication:  Post-Op Aspirin:  [X] Yes, [] No: contraindication:  Post-Op Statin:  [X] Yes, [] No: contraindication:    Ambulation/Activity Status: ambulate as tolerated    Assessment/Plan:  63y Male status-post CABG x4 POD #4  - Case and plan discussed with CTU Intensivist and CT Surgeon - Dr. Rodriguez/Tamika/José/Raghav  - Continue CTU supportive care and ongoing plan of care as per continuing CTU rounds.   - Continue DVT/GI prophylaxis  - Incentive Spirometry 10 times an hour  - Continue to advance physical activity as tolerated and continue PT/OT as directed  1. CAD s/p CABG: Continue ASA, statin, BB, pain control as needed. Reassess starting Plavix with Dr. Kumar  2. HTN: cont to monitor, start low dose bb   3. Post-op A. Fib ppx: monitor electrolytes, bb started   4. COPD/Hypoxia: cont nebs, wean o2 as tolerated, encourage incentive spirometry, lasix increased 20mg BID, for noncardiogenic fluid overload   5. DM/Glucose Control: insulin sliding scale  - PA/Lat completed,     Social Service Disposition: [facility] sent for rehab, awaiting authorization

## 2024-05-26 LAB
ALBUMIN SERPL ELPH-MCNC: 3.5 G/DL — SIGNIFICANT CHANGE UP (ref 3.5–5.2)
ALP SERPL-CCNC: 65 U/L — SIGNIFICANT CHANGE UP (ref 30–115)
ALT FLD-CCNC: 28 U/L — SIGNIFICANT CHANGE UP (ref 0–41)
ANION GAP SERPL CALC-SCNC: 10 MMOL/L — SIGNIFICANT CHANGE UP (ref 7–14)
ANION GAP SERPL CALC-SCNC: 11 MMOL/L — SIGNIFICANT CHANGE UP (ref 7–14)
AST SERPL-CCNC: 29 U/L — SIGNIFICANT CHANGE UP (ref 0–41)
BASOPHILS # BLD AUTO: 0.02 K/UL — SIGNIFICANT CHANGE UP (ref 0–0.2)
BASOPHILS NFR BLD AUTO: 0.3 % — SIGNIFICANT CHANGE UP (ref 0–1)
BILIRUB SERPL-MCNC: 0.5 MG/DL — SIGNIFICANT CHANGE UP (ref 0.2–1.2)
BUN SERPL-MCNC: 20 MG/DL — SIGNIFICANT CHANGE UP (ref 10–20)
BUN SERPL-MCNC: 23 MG/DL — HIGH (ref 10–20)
CALCIUM SERPL-MCNC: 8.6 MG/DL — SIGNIFICANT CHANGE UP (ref 8.4–10.4)
CALCIUM SERPL-MCNC: 9.1 MG/DL — SIGNIFICANT CHANGE UP (ref 8.4–10.5)
CHLORIDE SERPL-SCNC: 102 MMOL/L — SIGNIFICANT CHANGE UP (ref 98–110)
CHLORIDE SERPL-SCNC: 104 MMOL/L — SIGNIFICANT CHANGE UP (ref 98–110)
CO2 SERPL-SCNC: 23 MMOL/L — SIGNIFICANT CHANGE UP (ref 17–32)
CO2 SERPL-SCNC: 24 MMOL/L — SIGNIFICANT CHANGE UP (ref 17–32)
CREAT SERPL-MCNC: 1.1 MG/DL — SIGNIFICANT CHANGE UP (ref 0.7–1.5)
CREAT SERPL-MCNC: 1.1 MG/DL — SIGNIFICANT CHANGE UP (ref 0.7–1.5)
EGFR: 75 ML/MIN/1.73M2 — SIGNIFICANT CHANGE UP
EGFR: 75 ML/MIN/1.73M2 — SIGNIFICANT CHANGE UP
EOSINOPHIL # BLD AUTO: 0.2 K/UL — SIGNIFICANT CHANGE UP (ref 0–0.7)
EOSINOPHIL NFR BLD AUTO: 2.6 % — SIGNIFICANT CHANGE UP (ref 0–8)
GLUCOSE SERPL-MCNC: 101 MG/DL — HIGH (ref 70–99)
GLUCOSE SERPL-MCNC: 124 MG/DL — HIGH (ref 70–99)
HCT VFR BLD CALC: 35.5 % — LOW (ref 42–52)
HGB BLD-MCNC: 11.9 G/DL — LOW (ref 14–18)
IMM GRANULOCYTES NFR BLD AUTO: 0.7 % — HIGH (ref 0.1–0.3)
LYMPHOCYTES # BLD AUTO: 2.05 K/UL — SIGNIFICANT CHANGE UP (ref 1.2–3.4)
LYMPHOCYTES # BLD AUTO: 26.7 % — SIGNIFICANT CHANGE UP (ref 20.5–51.1)
MAGNESIUM SERPL-MCNC: 1.9 MG/DL — SIGNIFICANT CHANGE UP (ref 1.8–2.4)
MCHC RBC-ENTMCNC: 28.5 PG — SIGNIFICANT CHANGE UP (ref 27–31)
MCHC RBC-ENTMCNC: 33.5 G/DL — SIGNIFICANT CHANGE UP (ref 32–37)
MCV RBC AUTO: 85.1 FL — SIGNIFICANT CHANGE UP (ref 80–94)
MONOCYTES # BLD AUTO: 0.92 K/UL — HIGH (ref 0.1–0.6)
MONOCYTES NFR BLD AUTO: 12 % — HIGH (ref 1.7–9.3)
NEUTROPHILS # BLD AUTO: 4.43 K/UL — SIGNIFICANT CHANGE UP (ref 1.4–6.5)
NEUTROPHILS NFR BLD AUTO: 57.7 % — SIGNIFICANT CHANGE UP (ref 42.2–75.2)
NRBC # BLD: 0 /100 WBCS — SIGNIFICANT CHANGE UP (ref 0–0)
PLATELET # BLD AUTO: 202 K/UL — SIGNIFICANT CHANGE UP (ref 130–400)
PMV BLD: 10 FL — SIGNIFICANT CHANGE UP (ref 7.4–10.4)
POTASSIUM SERPL-MCNC: 4 MMOL/L — SIGNIFICANT CHANGE UP (ref 3.5–5)
POTASSIUM SERPL-MCNC: 4.5 MMOL/L — SIGNIFICANT CHANGE UP (ref 3.5–5)
POTASSIUM SERPL-SCNC: 4 MMOL/L — SIGNIFICANT CHANGE UP (ref 3.5–5)
POTASSIUM SERPL-SCNC: 4.5 MMOL/L — SIGNIFICANT CHANGE UP (ref 3.5–5)
PROT SERPL-MCNC: 5.6 G/DL — LOW (ref 6–8)
RBC # BLD: 4.17 M/UL — LOW (ref 4.7–6.1)
RBC # FLD: 13.2 % — SIGNIFICANT CHANGE UP (ref 11.5–14.5)
SODIUM SERPL-SCNC: 136 MMOL/L — SIGNIFICANT CHANGE UP (ref 135–146)
SODIUM SERPL-SCNC: 138 MMOL/L — SIGNIFICANT CHANGE UP (ref 135–146)
WBC # BLD: 7.67 K/UL — SIGNIFICANT CHANGE UP (ref 4.8–10.8)
WBC # FLD AUTO: 7.67 K/UL — SIGNIFICANT CHANGE UP (ref 4.8–10.8)

## 2024-05-26 PROCEDURE — 71045 X-RAY EXAM CHEST 1 VIEW: CPT | Mod: 26

## 2024-05-26 PROCEDURE — 99232 SBSQ HOSP IP/OBS MODERATE 35: CPT

## 2024-05-26 PROCEDURE — 93010 ELECTROCARDIOGRAM REPORT: CPT

## 2024-05-26 RX ORDER — ACETAMINOPHEN 500 MG
1000 TABLET ORAL ONCE
Refills: 0 | Status: COMPLETED | OUTPATIENT
Start: 2024-05-27 | End: 2024-05-27

## 2024-05-26 RX ORDER — MAGNESIUM SULFATE 500 MG/ML
2 VIAL (ML) INJECTION ONCE
Refills: 0 | Status: COMPLETED | OUTPATIENT
Start: 2024-05-26 | End: 2024-05-26

## 2024-05-26 RX ORDER — ACETAMINOPHEN 500 MG
1000 TABLET ORAL ONCE
Refills: 0 | Status: COMPLETED | OUTPATIENT
Start: 2024-05-28 | End: 2024-05-28

## 2024-05-26 RX ORDER — POTASSIUM CHLORIDE 20 MEQ
40 PACKET (EA) ORAL ONCE
Refills: 0 | Status: COMPLETED | OUTPATIENT
Start: 2024-05-26 | End: 2024-05-26

## 2024-05-26 RX ORDER — ACETAMINOPHEN 500 MG
1000 TABLET ORAL ONCE
Refills: 0 | Status: COMPLETED | OUTPATIENT
Start: 2024-05-28 | End: 2024-05-27

## 2024-05-26 RX ADMIN — Medication 25 GRAM(S): at 06:38

## 2024-05-26 RX ADMIN — Medication 12.5 MILLIGRAM(S): at 06:38

## 2024-05-26 RX ADMIN — Medication 12.5 MILLIGRAM(S): at 14:13

## 2024-05-26 RX ADMIN — Medication 400 MILLIGRAM(S): at 11:54

## 2024-05-26 RX ADMIN — Medication 20 MILLIEQUIVALENT(S): at 11:54

## 2024-05-26 RX ADMIN — ATORVASTATIN CALCIUM 40 MILLIGRAM(S): 80 TABLET, FILM COATED ORAL at 21:25

## 2024-05-26 RX ADMIN — FAMOTIDINE 20 MILLIGRAM(S): 10 INJECTION INTRAVENOUS at 17:25

## 2024-05-26 RX ADMIN — HEPARIN SODIUM 3000 UNIT(S): 5000 INJECTION INTRAVENOUS; SUBCUTANEOUS at 06:39

## 2024-05-26 RX ADMIN — Medication 40 MILLIEQUIVALENT(S): at 06:38

## 2024-05-26 RX ADMIN — Medication 400 MILLIGRAM(S): at 17:25

## 2024-05-26 RX ADMIN — Medication 20 MILLIGRAM(S): at 06:40

## 2024-05-26 RX ADMIN — FAMOTIDINE 20 MILLIGRAM(S): 10 INJECTION INTRAVENOUS at 06:38

## 2024-05-26 RX ADMIN — HEPARIN SODIUM 3000 UNIT(S): 5000 INJECTION INTRAVENOUS; SUBCUTANEOUS at 17:25

## 2024-05-26 RX ADMIN — Medication 400 MILLIGRAM(S): at 06:38

## 2024-05-26 RX ADMIN — Medication 1000 MILLIGRAM(S): at 07:19

## 2024-05-26 RX ADMIN — Medication 12.5 MILLIGRAM(S): at 21:25

## 2024-05-26 RX ADMIN — Medication 1000 MILLIGRAM(S): at 14:10

## 2024-05-26 RX ADMIN — Medication 400 MILLIGRAM(S): at 23:01

## 2024-05-26 RX ADMIN — Medication 400 MILLIGRAM(S): at 00:14

## 2024-05-26 RX ADMIN — CHLORHEXIDINE GLUCONATE 1 APPLICATION(S): 213 SOLUTION TOPICAL at 06:40

## 2024-05-26 RX ADMIN — Medication 20 MILLIGRAM(S): at 17:25

## 2024-05-26 RX ADMIN — Medication 325 MILLIGRAM(S): at 11:53

## 2024-05-26 RX ADMIN — Medication 15 MILLIGRAM(S): at 23:01

## 2024-05-26 NOTE — PROGRESS NOTE ADULT - SUBJECTIVE AND OBJECTIVE BOX
OPERATIVE PROCEDURE(s): CABG x4                POD #5                       SURGEON(s): MIGUEL Kumar      SUBJECTIVE ASSESSMENT:63yMale patient seen and examined at bedside. No complaints at this time    Vital Signs Last 24 Hrs  T(F): 96.4 (26 May 2024 04:00), Max: 97.2 (25 May 2024 08:00)  HR: 87 (26 May 2024 06:00) (71 - 93)  BP: 132/75 (26 May 2024 06:00) (88/67 - 139/88)  BP(mean): 97 (26 May 2024 06:00) (73 - 104)  RR: 27 (26 May 2024 06:00) (14 - 43)  SpO2: 97% (26 May 2024 05:00) (96% - 98%)      I&O's Detail    24 May 2024 07:01  -  25 May 2024 07:00  --------------------------------------------------------  IN:    IV PiggyBack: 400 mL    Oral Fluid: 1707 mL  Total IN: 2107 mL    OUT:    Voided (mL): 1746 mL  Total OUT: 1746 mL        Net: I&O's Detail    23 May 2024 07:01  -  24 May 2024 07:00  --------------------------------------------------------  Total NET: -13 mL      24 May 2024 07:01  -  25 May 2024 07:00  --------------------------------------------------------  Total NET: 361 mL        CAPILLARY BLOOD GLUCOSE      POCT Blood Glucose.: 148 mg/dL (25 May 2024 10:59)  POCT Blood Glucose.: 102 mg/dL (25 May 2024 07:19)    A1C with Estimated Average Glucose Result: 6.0 % (05-18-24 @ 08:08)      Physical Exam:  General: NAD; A&Ox3  Neuro: pupils equal and reactive, speech clear, no overt sensory or motor deficits  Cardiac: S1/S2, RRR, no murmur, no rubs  Lungs: unlabored shallow respirations, decreased in bilateral bases  Abdomen: Soft/NT/ND; positive bowel sounds x 4  Sternum: Intact, no click, incision healing well with no drainage  Incisions: Incisions clean/dry/intact  Extremities: Mild edema b/l lower extremities; good capillary refill; no cyanosis; palpable 1+ pedal pulses b/l    Central Venous Catheter: Yes: critical illness, intravenous access  Day #5  EPICARDIAL WIRES:  YES                                                                         Day # 5  BOWEL MOVEMENT:  [X] YES [] NO, If No, Timing since last BM Day #        LABS:                        11.9<L>  7.67  )-----------( 202      ( 26 May 2024 02:00 )             35.5<L>                        11.4<L>  8.64  )-----------( 157      ( 25 May 2024 02:28 )             34.7<L>    05-26    138  |  104  |  20  ----------------------------<  101<H>  4.0   |  23  |  1.1  05-25    139  |  104  |  21<H>  ----------------------------<  139<H>  4.1   |  24  |  1.2    Ca    8.6      26 May 2024 02:00  Mg     1.9     05-26    TPro  5.6<L> [6.0 - 8.0]  /  Alb  3.5 [3.5 - 5.2]  /  TBili  0.5 [0.2 - 1.2]  /  DBili  x   /  AST  29 [0 - 41]  /  ALT  28 [0 - 41]  /  AlkPhos  65 [30 - 115]  05-26      Urinalysis Basic - ( 26 May 2024 02:00 )    Color: x / Appearance: x / SG: x / pH: x  Gluc: 101 mg/dL / Ketone: x  / Bili: x / Urobili: x   Blood: x / Protein: x / Nitrite: x   Leuk Esterase: x / RBC: x / WBC x   Sq Epi: x / Non Sq Epi: x / Bacteria: x        RADIOLOGY & ADDITIONAL TESTS:  CXR:   < from: Xray Chest 2 Views PA/Lat (05.25.24 @ 08:45) >  INTERPRETATION:  Clinical History / Reason for exam: Shortness of breath    Comparison : Chest radiograph May 24, 2024.    Technique/Positioning: PA and lateral.    Findings:    Support devices: Stable position right internal jugular catheter sheath..    Cardiac/mediastinum/hilum: Left thyroid lobe enlargement. Cardiomegaly,   status post median sternotomy, CABG.    Lung parenchyma/Pleura: Right pleural effusion. Left basilar   opacity/pleural effusion..    Skeleton/soft tissues: Stable.    Impression:    Right pleural effusion, new. Left basilar opacity/pleural effusion,   increased. Stable cardiomegaly and left thyroid lobe enlargement. .        EKG:  < from: 12 Lead ECG (05.24.24 @ 07:40) >    Ventricular Rate 83 BPM    Atrial Rate 83 BPM    P-R Interval 130 ms    QRS Duration 102 ms    Q-T Interval 350 ms    QTC Calculation(Bazett) 411 ms    P Axis 44 degrees    R Axis 32 degrees    T Axis -41 degrees    Diagnosis Line Normal sinus rhythm  Possible Inferior infarct , age undetermined  T wave abnormality, consider anterolateral ischemia  Abnormal ECG        Allergies    No Known Allergies    Intolerances      MEDICATIONS  (STANDING):  acetaminophen   IVPB .. 1000 milliGRAM(s) IV Intermittent once  acetaminophen   IVPB .. 1000 milliGRAM(s) IV Intermittent once  aspirin enteric coated 325 milliGRAM(s) Oral daily  atorvastatin 40 milliGRAM(s) Oral at bedtime  chlorhexidine 2% Cloths 1 Application(s) Topical daily  dextrose 10% Bolus 125 milliLiter(s) IV Bolus once  dextrose 5%. 1000 milliLiter(s) (100 mL/Hr) IV Continuous <Continuous>  dextrose 5%. 1000 milliLiter(s) (50 mL/Hr) IV Continuous <Continuous>  famotidine    Tablet 20 milliGRAM(s) Oral two times a day  furosemide   Injectable 20 milliGRAM(s) IV Push every 12 hours  glucagon  Injectable 1 milliGRAM(s) IntraMuscular once  heparin   Injectable 3000 Unit(s) SubCutaneous every 12 hours  metoprolol tartrate 12.5 milliGRAM(s) Oral every 8 hours  potassium chloride    Tablet ER 20 milliEquivalent(s) Oral daily    MEDICATIONS  (PRN):  fentaNYL    Injectable 25 MICROGram(s) IV Push every 4 hours PRN Severe Pain (7 - 10)  ketorolac   Injectable 15 milliGRAM(s) IV Push every 4 hours PRN Severe Pain (7 - 10)  ondansetron Injectable 4 milliGRAM(s) IV Push every 4 hours PRN Nausea and/or Vomiting  oxyCODONE    IR 5 milliGRAM(s) Oral every 4 hours PRN Moderate Pain (4 - 6)  oxyCODONE    IR 10 milliGRAM(s) Oral every 4 hours PRN Severe Pain (7 - 10)    Home Medications:  Aspir 81 oral delayed release tablet: 1 tab(s) orally once a day (17 May 2024 14:10)  Lipitor 20 mg oral tablet: 1 tab(s) orally once a day (17 May 2024 14:10)  losartan 50 mg oral tablet: 1 tab(s) orally once a day (17 May 2024 14:10)  Metoprolol Tartrate 25 mg oral tablet: 1 tab(s) orally 2 times a day (17 May 2024 14:10)      Pharmacologic DVT Prophylaxis [X] YES, [] NO: Contraindication:  SCD's: YES b/l    GI Prophylaxis: pepcid    Post-Op Beta-Blockers: [X] Yes, [] No: contraindication:  Post-Op Aspirin:  [X] Yes, [] No: contraindication:  Post-Op Statin:  [X] Yes, [] No: contraindication:    Ambulation/Activity Status: ambulate as tolerated    Assessment/Plan:  63y Male status-post CABG x4 POD #5  - Case and plan discussed with CTU Intensivist and CT Surgeon - Dr. Rodriguez/Tamika/José/Raghav  - Continue CTU supportive care and ongoing plan of care as per continuing CTU rounds.   - Continue DVT/GI prophylaxis  - Incentive Spirometry 10 times an hour  - Continue to advance physical activity as tolerated and continue PT/OT as directed  1. CAD s/p CABG: Continue ASA, statin, BB, pain control as needed. Reassess starting Plavix with Dr. Kumar  2. HTN: cont to monitor, start low dose bb   3. Post-op A. Fib ppx: monitor electrolytes, bb started   4. COPD/Hypoxia: cont nebs, wean o2 as tolerated, encourage incentive spirometry, lasix increased 20mg BID, for noncardiogenic fluid overload   5. DM/Glucose Control: insulin sliding scale     Social Service Disposition: [facility] sent for rehab, awaiting authorization   OPERATIVE PROCEDURE(s): CABG x4                POD #5                       SURGEON(s): MIGUEL Kumar      SUBJECTIVE ASSESSMENT:63yMale patient seen and examined at bedside. No complaints at this time    Vital Signs Last 24 Hrs  T(F): 96.4 (26 May 2024 04:00), Max: 97.2 (25 May 2024 08:00)  HR: 87 (26 May 2024 06:00) (71 - 93)  BP: 132/75 (26 May 2024 06:00) (88/67 - 139/88)  BP(mean): 97 (26 May 2024 06:00) (73 - 104)  RR: 27 (26 May 2024 06:00) (14 - 43)  SpO2: 97% (26 May 2024 05:00) (96% - 98%)      I&O's Detail    24 May 2024 07:01  -  25 May 2024 07:00  --------------------------------------------------------  IN:    IV PiggyBack: 400 mL    Oral Fluid: 1707 mL  Total IN: 2107 mL    OUT:    Voided (mL): 1746 mL  Total OUT: 1746 mL        Net: I&O's Detail    23 May 2024 07:01  -  24 May 2024 07:00  --------------------------------------------------------  Total NET: -13 mL      24 May 2024 07:01  -  25 May 2024 07:00  --------------------------------------------------------  Total NET: 361 mL        CAPILLARY BLOOD GLUCOSE      POCT Blood Glucose.: 148 mg/dL (25 May 2024 10:59)  POCT Blood Glucose.: 102 mg/dL (25 May 2024 07:19)    A1C with Estimated Average Glucose Result: 6.0 % (05-18-24 @ 08:08)      Physical Exam:  General: NAD; A&Ox3  Neuro: pupils equal and reactive, speech clear, no overt sensory or motor deficits  Cardiac: S1/S2, RRR, no murmur, no rubs  Lungs: unlabored shallow respirations, decreased in bilateral bases  Abdomen: Soft/NT/ND; positive bowel sounds x 4  Sternum: Intact, no click, incision healing well with no drainage  Incisions: Incisions clean/dry/intact  Extremities: Mild edema b/l lower extremities; good capillary refill; no cyanosis; palpable 1+ pedal pulses b/l    Central Venous Catheter: Yes: critical illness, intravenous access  Day #5  EPICARDIAL WIRES:  YES                                                                         Day # 5  BOWEL MOVEMENT:  [X] YES [] NO, If No, Timing since last BM Day #        LABS:                        11.9<L>  7.67  )-----------( 202      ( 26 May 2024 02:00 )             35.5<L>                        11.4<L>  8.64  )-----------( 157      ( 25 May 2024 02:28 )             34.7<L>    05-26    138  |  104  |  20  ----------------------------<  101<H>  4.0   |  23  |  1.1  05-25    139  |  104  |  21<H>  ----------------------------<  139<H>  4.1   |  24  |  1.2    Ca    8.6      26 May 2024 02:00  Mg     1.9     05-26    TPro  5.6<L> [6.0 - 8.0]  /  Alb  3.5 [3.5 - 5.2]  /  TBili  0.5 [0.2 - 1.2]  /  DBili  x   /  AST  29 [0 - 41]  /  ALT  28 [0 - 41]  /  AlkPhos  65 [30 - 115]  05-26      Urinalysis Basic - ( 26 May 2024 02:00 )    Color: x / Appearance: x / SG: x / pH: x  Gluc: 101 mg/dL / Ketone: x  / Bili: x / Urobili: x   Blood: x / Protein: x / Nitrite: x   Leuk Esterase: x / RBC: x / WBC x   Sq Epi: x / Non Sq Epi: x / Bacteria: x        RADIOLOGY & ADDITIONAL TESTS:  CXR:   < from: Xray Chest 2 Views PA/Lat (05.25.24 @ 08:45) >  INTERPRETATION:  Clinical History / Reason for exam: Shortness of breath    Comparison : Chest radiograph May 24, 2024.    Technique/Positioning: PA and lateral.    Findings:    Support devices: Stable position right internal jugular catheter sheath..    Cardiac/mediastinum/hilum: Left thyroid lobe enlargement. Cardiomegaly,   status post median sternotomy, CABG.    Lung parenchyma/Pleura: Right pleural effusion. Left basilar   opacity/pleural effusion..    Skeleton/soft tissues: Stable.    Impression:    Right pleural effusion, new. Left basilar opacity/pleural effusion,   increased. Stable cardiomegaly and left thyroid lobe enlargement. .        EKG:  < from: 12 Lead ECG (05.24.24 @ 07:40) >    Ventricular Rate 83 BPM    Atrial Rate 83 BPM    P-R Interval 130 ms    QRS Duration 102 ms    Q-T Interval 350 ms    QTC Calculation(Bazett) 411 ms    P Axis 44 degrees    R Axis 32 degrees    T Axis -41 degrees    Diagnosis Line Normal sinus rhythm  Possible Inferior infarct , age undetermined  T wave abnormality, consider anterolateral ischemia  Abnormal ECG        Allergies    No Known Allergies    Intolerances      MEDICATIONS  (STANDING):  acetaminophen   IVPB .. 1000 milliGRAM(s) IV Intermittent once  acetaminophen   IVPB .. 1000 milliGRAM(s) IV Intermittent once  aspirin enteric coated 325 milliGRAM(s) Oral daily  atorvastatin 40 milliGRAM(s) Oral at bedtime  chlorhexidine 2% Cloths 1 Application(s) Topical daily  dextrose 10% Bolus 125 milliLiter(s) IV Bolus once  dextrose 5%. 1000 milliLiter(s) (100 mL/Hr) IV Continuous <Continuous>  dextrose 5%. 1000 milliLiter(s) (50 mL/Hr) IV Continuous <Continuous>  famotidine    Tablet 20 milliGRAM(s) Oral two times a day  furosemide   Injectable 20 milliGRAM(s) IV Push every 12 hours  glucagon  Injectable 1 milliGRAM(s) IntraMuscular once  heparin   Injectable 3000 Unit(s) SubCutaneous every 12 hours  metoprolol tartrate 12.5 milliGRAM(s) Oral every 8 hours  potassium chloride    Tablet ER 20 milliEquivalent(s) Oral daily    MEDICATIONS  (PRN):  fentaNYL    Injectable 25 MICROGram(s) IV Push every 4 hours PRN Severe Pain (7 - 10)  ketorolac   Injectable 15 milliGRAM(s) IV Push every 4 hours PRN Severe Pain (7 - 10)  ondansetron Injectable 4 milliGRAM(s) IV Push every 4 hours PRN Nausea and/or Vomiting  oxyCODONE    IR 5 milliGRAM(s) Oral every 4 hours PRN Moderate Pain (4 - 6)  oxyCODONE    IR 10 milliGRAM(s) Oral every 4 hours PRN Severe Pain (7 - 10)    Home Medications:  Aspir 81 oral delayed release tablet: 1 tab(s) orally once a day (17 May 2024 14:10)  Lipitor 20 mg oral tablet: 1 tab(s) orally once a day (17 May 2024 14:10)  losartan 50 mg oral tablet: 1 tab(s) orally once a day (17 May 2024 14:10)  Metoprolol Tartrate 25 mg oral tablet: 1 tab(s) orally 2 times a day (17 May 2024 14:10)      Pharmacologic DVT Prophylaxis [X] YES, [] NO: Contraindication:  SCD's: YES b/l    GI Prophylaxis: pepcid    Post-Op Beta-Blockers: [X] Yes, [] No: contraindication:  Post-Op Aspirin:  [X] Yes, [] No: contraindication:  Post-Op Statin:  [X] Yes, [] No: contraindication:    Ambulation/Activity Status: ambulate as tolerated    Assessment/Plan:  63y Male status-post CABG x4 POD #5  - Case and plan discussed with CTU Intensivist and CT Surgeon - Dr. Rodriguez/Tamika/José/Raghav  - Continue CTU supportive care and ongoing plan of care as per continuing CTU rounds.   - Continue DVT/GI prophylaxis  - Incentive Spirometry 10 times an hour  - Continue to advance physical activity as tolerated and continue PT/OT as directed  1. CAD s/p CABG: Continue ASA, statin, BB, pain control as needed. Reassess starting Plavix with Dr. Kumar  2. HTN: cont to monitor, start low dose bb   3. Post-op A. Fib ppx: monitor electrolytes, bb started   4. COPD/Hypoxia: cont nebs, wean o2 as tolerated, encourage incentive spirometry, lasix BID  5. DM/Glucose Control: insulin sliding scale     Social Service Disposition: [facility] sent for rehab, awaiting authorization

## 2024-05-26 NOTE — PROGRESS NOTE ADULT - SUBJECTIVE AND OBJECTIVE BOX
Over Night Events:  no major events      ROS:  See HPI    PHYSICAL EXAM    ICU Vital Signs Last 24 Hrs  T(C): 35.8 (26 May 2024 04:00), Max: 36.1 (25 May 2024 20:00)  T(F): 96.4 (26 May 2024 04:00), Max: 97 (25 May 2024 20:00)  HR: 76 (26 May 2024 07:00) (71 - 93)  BP: 103/68 (26 May 2024 07:00) (88/67 - 139/88)  BP(mean): 80 (26 May 2024 07:00) (73 - 104)  RR: 22 (26 May 2024 07:00) (14 - 43)  SpO2: 97% (26 May 2024 07:00) (96% - 98%)    O2 Parameters below as of 26 May 2024 06:00  Patient On (Oxygen Delivery Method): room air            General: NARD  HEENT: JUDIT             Lymph Nodes: No cervical LN   Lungs: Bilateral BS  Cardiovascular: Regular   Abdomen: Soft, Positive BS  Extremities: LE edema  Skin: Warm  Neurological: Non focal, AAAOx3       05-25-24 @ 07:01  -  05-26-24 @ 07:00  --------------------------------------------------------  IN:    IV PiggyBack: 300 mL    Oral Fluid: 1320 mL  Total IN: 1620 mL    OUT:    Voided (mL): 1740 mL  Total OUT: 1740 mL    Total NET: -120 mL          LABS:                          11.9   7.67  )-----------( 202      ( 26 May 2024 02:00 )             35.5                                               05-26    138  |  104  |  20  ----------------------------<  101<H>  4.0   |  23  |  1.1    Ca    8.6      26 May 2024 02:00  Mg     1.9     05-26    TPro  5.6<L>  /  Alb  3.5  /  TBili  0.5  /  DBili  x   /  AST  29  /  ALT  28  /  AlkPhos  65  05-26                                             Urinalysis Basic - ( 26 May 2024 02:00 )    Color: x / Appearance: x / SG: x / pH: x  Gluc: 101 mg/dL / Ketone: x  / Bili: x / Urobili: x   Blood: x / Protein: x / Nitrite: x   Leuk Esterase: x / RBC: x / WBC x   Sq Epi: x / Non Sq Epi: x / Bacteria: x                                                  LIVER FUNCTIONS - ( 26 May 2024 02:00 )  Alb: 3.5 g/dL / Pro: 5.6 g/dL / ALK PHOS: 65 U/L / ALT: 28 U/L / AST: 29 U/L / GGT: x                                                                                                                                       MEDICATIONS  (STANDING):  acetaminophen   IVPB .. 1000 milliGRAM(s) IV Intermittent once  acetaminophen   IVPB .. 1000 milliGRAM(s) IV Intermittent once  aspirin enteric coated 325 milliGRAM(s) Oral daily  atorvastatin 40 milliGRAM(s) Oral at bedtime  chlorhexidine 2% Cloths 1 Application(s) Topical daily  dextrose 10% Bolus 125 milliLiter(s) IV Bolus once  dextrose 5%. 1000 milliLiter(s) (50 mL/Hr) IV Continuous <Continuous>  dextrose 5%. 1000 milliLiter(s) (100 mL/Hr) IV Continuous <Continuous>  famotidine    Tablet 20 milliGRAM(s) Oral two times a day  furosemide   Injectable 20 milliGRAM(s) IV Push every 12 hours  glucagon  Injectable 1 milliGRAM(s) IntraMuscular once  heparin   Injectable 3000 Unit(s) SubCutaneous every 12 hours  metoprolol tartrate 12.5 milliGRAM(s) Oral every 8 hours  potassium chloride    Tablet ER 20 milliEquivalent(s) Oral daily    MEDICATIONS  (PRN):  fentaNYL    Injectable 25 MICROGram(s) IV Push every 4 hours PRN Severe Pain (7 - 10)  ketorolac   Injectable 15 milliGRAM(s) IV Push every 4 hours PRN Severe Pain (7 - 10)  ondansetron Injectable 4 milliGRAM(s) IV Push every 4 hours PRN Nausea and/or Vomiting  oxyCODONE    IR 5 milliGRAM(s) Oral every 4 hours PRN Moderate Pain (4 - 6)  oxyCODONE    IR 10 milliGRAM(s) Oral every 4 hours PRN Severe Pain (7 - 10)      Xrays:  improved left side opacity                                                                              ECHO

## 2024-05-27 LAB
ALBUMIN SERPL ELPH-MCNC: 3.6 G/DL — SIGNIFICANT CHANGE UP (ref 3.5–5.2)
ALP SERPL-CCNC: 71 U/L — SIGNIFICANT CHANGE UP (ref 30–115)
ALT FLD-CCNC: 52 U/L — HIGH (ref 0–41)
ANION GAP SERPL CALC-SCNC: 12 MMOL/L — SIGNIFICANT CHANGE UP (ref 7–14)
ANION GAP SERPL CALC-SCNC: 13 MMOL/L — SIGNIFICANT CHANGE UP (ref 7–14)
AST SERPL-CCNC: 42 U/L — HIGH (ref 0–41)
BASOPHILS # BLD AUTO: 0.03 K/UL — SIGNIFICANT CHANGE UP (ref 0–0.2)
BASOPHILS NFR BLD AUTO: 0.4 % — SIGNIFICANT CHANGE UP (ref 0–1)
BILIRUB SERPL-MCNC: 0.5 MG/DL — SIGNIFICANT CHANGE UP (ref 0.2–1.2)
BUN SERPL-MCNC: 23 MG/DL — HIGH (ref 10–20)
BUN SERPL-MCNC: 28 MG/DL — HIGH (ref 10–20)
CALCIUM SERPL-MCNC: 8.8 MG/DL — SIGNIFICANT CHANGE UP (ref 8.4–10.5)
CALCIUM SERPL-MCNC: 9.3 MG/DL — SIGNIFICANT CHANGE UP (ref 8.4–10.5)
CHLORIDE SERPL-SCNC: 103 MMOL/L — SIGNIFICANT CHANGE UP (ref 98–110)
CHLORIDE SERPL-SCNC: 97 MMOL/L — LOW (ref 98–110)
CO2 SERPL-SCNC: 23 MMOL/L — SIGNIFICANT CHANGE UP (ref 17–32)
CO2 SERPL-SCNC: 24 MMOL/L — SIGNIFICANT CHANGE UP (ref 17–32)
CREAT SERPL-MCNC: 1.1 MG/DL — SIGNIFICANT CHANGE UP (ref 0.7–1.5)
CREAT SERPL-MCNC: 1.3 MG/DL — SIGNIFICANT CHANGE UP (ref 0.7–1.5)
EGFR: 62 ML/MIN/1.73M2 — SIGNIFICANT CHANGE UP
EGFR: 75 ML/MIN/1.73M2 — SIGNIFICANT CHANGE UP
EOSINOPHIL # BLD AUTO: 0.16 K/UL — SIGNIFICANT CHANGE UP (ref 0–0.7)
EOSINOPHIL NFR BLD AUTO: 1.9 % — SIGNIFICANT CHANGE UP (ref 0–8)
GLUCOSE SERPL-MCNC: 104 MG/DL — HIGH (ref 70–99)
GLUCOSE SERPL-MCNC: 143 MG/DL — HIGH (ref 70–99)
HCT VFR BLD CALC: 37.4 % — LOW (ref 42–52)
HGB BLD-MCNC: 12.2 G/DL — LOW (ref 14–18)
IMM GRANULOCYTES NFR BLD AUTO: 0.9 % — HIGH (ref 0.1–0.3)
LYMPHOCYTES # BLD AUTO: 2.15 K/UL — SIGNIFICANT CHANGE UP (ref 1.2–3.4)
LYMPHOCYTES # BLD AUTO: 25.4 % — SIGNIFICANT CHANGE UP (ref 20.5–51.1)
MAGNESIUM SERPL-MCNC: 2.2 MG/DL — SIGNIFICANT CHANGE UP (ref 1.8–2.4)
MCHC RBC-ENTMCNC: 28.3 PG — SIGNIFICANT CHANGE UP (ref 27–31)
MCHC RBC-ENTMCNC: 32.6 G/DL — SIGNIFICANT CHANGE UP (ref 32–37)
MCV RBC AUTO: 86.8 FL — SIGNIFICANT CHANGE UP (ref 80–94)
MONOCYTES # BLD AUTO: 0.99 K/UL — HIGH (ref 0.1–0.6)
MONOCYTES NFR BLD AUTO: 11.7 % — HIGH (ref 1.7–9.3)
NEUTROPHILS # BLD AUTO: 5.05 K/UL — SIGNIFICANT CHANGE UP (ref 1.4–6.5)
NEUTROPHILS NFR BLD AUTO: 59.7 % — SIGNIFICANT CHANGE UP (ref 42.2–75.2)
NRBC # BLD: 0 /100 WBCS — SIGNIFICANT CHANGE UP (ref 0–0)
PLATELET # BLD AUTO: 218 K/UL — SIGNIFICANT CHANGE UP (ref 130–400)
PMV BLD: 9.5 FL — SIGNIFICANT CHANGE UP (ref 7.4–10.4)
POTASSIUM SERPL-MCNC: 4.2 MMOL/L — SIGNIFICANT CHANGE UP (ref 3.5–5)
POTASSIUM SERPL-MCNC: 4.8 MMOL/L — SIGNIFICANT CHANGE UP (ref 3.5–5)
POTASSIUM SERPL-SCNC: 4.2 MMOL/L — SIGNIFICANT CHANGE UP (ref 3.5–5)
POTASSIUM SERPL-SCNC: 4.8 MMOL/L — SIGNIFICANT CHANGE UP (ref 3.5–5)
PROT SERPL-MCNC: 5.7 G/DL — LOW (ref 6–8)
RBC # BLD: 4.31 M/UL — LOW (ref 4.7–6.1)
RBC # FLD: 13.2 % — SIGNIFICANT CHANGE UP (ref 11.5–14.5)
SODIUM SERPL-SCNC: 133 MMOL/L — LOW (ref 135–146)
SODIUM SERPL-SCNC: 139 MMOL/L — SIGNIFICANT CHANGE UP (ref 135–146)
WBC # BLD: 8.46 K/UL — SIGNIFICANT CHANGE UP (ref 4.8–10.8)
WBC # FLD AUTO: 8.46 K/UL — SIGNIFICANT CHANGE UP (ref 4.8–10.8)

## 2024-05-27 PROCEDURE — 93010 ELECTROCARDIOGRAM REPORT: CPT

## 2024-05-27 PROCEDURE — 71045 X-RAY EXAM CHEST 1 VIEW: CPT | Mod: 26

## 2024-05-27 PROCEDURE — 99233 SBSQ HOSP IP/OBS HIGH 50: CPT

## 2024-05-27 RX ORDER — CLOPIDOGREL BISULFATE 75 MG/1
75 TABLET, FILM COATED ORAL DAILY
Refills: 0 | Status: DISCONTINUED | OUTPATIENT
Start: 2024-05-27 | End: 2024-05-29

## 2024-05-27 RX ORDER — FUROSEMIDE 40 MG
20 TABLET ORAL DAILY
Refills: 0 | Status: DISCONTINUED | OUTPATIENT
Start: 2024-05-27 | End: 2024-05-29

## 2024-05-27 RX ORDER — ASPIRIN/CALCIUM CARB/MAGNESIUM 324 MG
81 TABLET ORAL DAILY
Refills: 0 | Status: DISCONTINUED | OUTPATIENT
Start: 2024-05-27 | End: 2024-05-29

## 2024-05-27 RX ORDER — POTASSIUM CHLORIDE 20 MEQ
20 PACKET (EA) ORAL
Refills: 0 | Status: COMPLETED | OUTPATIENT
Start: 2024-05-27 | End: 2024-05-27

## 2024-05-27 RX ADMIN — Medication 400 MILLIGRAM(S): at 05:05

## 2024-05-27 RX ADMIN — Medication 400 MILLIGRAM(S): at 17:12

## 2024-05-27 RX ADMIN — Medication 325 MILLIGRAM(S): at 11:51

## 2024-05-27 RX ADMIN — Medication 15 MILLIGRAM(S): at 00:01

## 2024-05-27 RX ADMIN — Medication 1000 MILLIGRAM(S): at 18:00

## 2024-05-27 RX ADMIN — Medication 12.5 MILLIGRAM(S): at 13:09

## 2024-05-27 RX ADMIN — Medication 12.5 MILLIGRAM(S): at 20:57

## 2024-05-27 RX ADMIN — ATORVASTATIN CALCIUM 40 MILLIGRAM(S): 80 TABLET, FILM COATED ORAL at 20:58

## 2024-05-27 RX ADMIN — Medication 1000 MILLIGRAM(S): at 00:01

## 2024-05-27 RX ADMIN — CLOPIDOGREL BISULFATE 75 MILLIGRAM(S): 75 TABLET, FILM COATED ORAL at 15:21

## 2024-05-27 RX ADMIN — Medication 20 MILLIEQUIVALENT(S): at 08:20

## 2024-05-27 RX ADMIN — Medication 20 MILLIGRAM(S): at 11:57

## 2024-05-27 RX ADMIN — FAMOTIDINE 20 MILLIGRAM(S): 10 INJECTION INTRAVENOUS at 05:07

## 2024-05-27 RX ADMIN — Medication 20 MILLIGRAM(S): at 05:06

## 2024-05-27 RX ADMIN — Medication 20 MILLIEQUIVALENT(S): at 05:43

## 2024-05-27 RX ADMIN — Medication 20 MILLIEQUIVALENT(S): at 11:51

## 2024-05-27 RX ADMIN — Medication 400 MILLIGRAM(S): at 23:19

## 2024-05-27 RX ADMIN — HEPARIN SODIUM 3000 UNIT(S): 5000 INJECTION INTRAVENOUS; SUBCUTANEOUS at 17:13

## 2024-05-27 RX ADMIN — Medication 400 MILLIGRAM(S): at 11:51

## 2024-05-27 RX ADMIN — Medication 1000 MILLIGRAM(S): at 12:15

## 2024-05-27 RX ADMIN — FAMOTIDINE 20 MILLIGRAM(S): 10 INJECTION INTRAVENOUS at 17:13

## 2024-05-27 RX ADMIN — Medication 12.5 MILLIGRAM(S): at 05:06

## 2024-05-27 RX ADMIN — CHLORHEXIDINE GLUCONATE 1 APPLICATION(S): 213 SOLUTION TOPICAL at 05:42

## 2024-05-27 RX ADMIN — HEPARIN SODIUM 3000 UNIT(S): 5000 INJECTION INTRAVENOUS; SUBCUTANEOUS at 05:06

## 2024-05-27 NOTE — PROGRESS NOTE ADULT - SUBJECTIVE AND OBJECTIVE BOX
OPERATIVE PROCEDURE(s): CABG x4                POD #5                       SURGEON(s): MIGUEL Kumar      SUBJECTIVE ASSESSMENT:63yMale patient seen and examined at bedside. No complaints at this time    Vital Signs Last 24 Hrs  T(F): 98.6 (27 May 2024 04:00), Max: 98.6 (27 May 2024 04:00)  HR: 76 (27 May 2024 07:00) (70 - 96)  BP: 115/72 (27 May 2024 07:00) (93/60 - 140/79)  BP(mean): 92 (27 May 2024 06:00) (69 - 100)  RR: 17 (27 May 2024 07:00) (15 - 36)  SpO2: 93% (27 May 2024 06:00) (93% - 99%)      I&O's Detail    26 May 2024 07:01  -  27 May 2024 07:00  --------------------------------------------------------  IN:    IV PiggyBack: 200 mL    Oral Fluid: 1100 mL  Total IN: 1300 mL    OUT:    Voided (mL): 2680 mL  Total OUT: 2680 mL        Net: I&O's Detail    25 May 2024 07:01  -  26 May 2024 07:00  --------------------------------------------------------  Total NET: -120 mL      26 May 2024 07:01  -  27 May 2024 07:00  --------------------------------------------------------  Total NET: -1380 mL        CAPILLARY BLOOD GLUCOSE        A1C with Estimated Average Glucose Result: 6.0 % (05-18-24 @ 08:08)      Physical Exam:  General: NAD; A&Ox3  Neuro: pupils equal and reactive, speech clear, no overt sensory or motor deficits  Cardiac: S1/S2, RRR, no murmur, no rubs  Lungs: unlabored shallow respirations, decreased in bilateral bases  Abdomen: Soft/NT/ND; positive bowel sounds x 4  Sternum: Intact, no click, incision healing well with no drainage  Incisions: Incisions clean/dry/intact  Extremities: Mild edema b/l lower extremities; good capillary refill; no cyanosis; palpable 1+ pedal pulses b/l    Central Venous Catheter: Yes: critical illness, intravenous access  Day # 6  BOWEL MOVEMENT:  [X] YES [] NO, If No, Timing since last BM Day #      LABS:                        12.2<L>  8.46  )-----------( 218      ( 27 May 2024 01:49 )             37.4<L>                        11.9<L>  7.67  )-----------( 202      ( 26 May 2024 02:00 )             35.5<L>    05-27    139  |  103  |  23<H>  ----------------------------<  104<H>  4.2   |  24  |  1.1  05-26    136  |  102  |  23<H>  ----------------------------<  124<H>  4.5   |  24  |  1.1    Ca    8.8      27 May 2024 01:49  Mg     2.2     05-27    TPro  5.7<L> [6.0 - 8.0]  /  Alb  3.6 [3.5 - 5.2]  /  TBili  0.5 [0.2 - 1.2]  /  DBili  x   /  AST  42<H> [0 - 41]  /  ALT  52<H> [0 - 41]  /  AlkPhos  71 [30 - 115]  05-27      Urinalysis Basic - ( 27 May 2024 01:49 )    Color: x / Appearance: x / SG: x / pH: x  Gluc: 104 mg/dL / Ketone: x  / Bili: x / Urobili: x   Blood: x / Protein: x / Nitrite: x   Leuk Esterase: x / RBC: x / WBC x   Sq Epi: x / Non Sq Epi: x / Bacteria: x        RADIOLOGY & ADDITIONAL TESTS:  CXR:   < from: Xray Chest 1 View- PORTABLE-Routine (Xray Chest 1 View- PORTABLE-Routine in AM.) (05.26.24 @ 05:49) >  INTERPRETATION:  Clinical History / Reason for exam: Dyspnea    Comparison : Chest radiograph 5/22/2024 and 5/25/2024.    Technique/Positioning: Frontal.    Findings:    Support devices: None.    Cardiac/mediastinum/hilum: Deviation of trachea to the right suggesting a   superior mediastinal mass perhaps related to thyroid gland.    Lung parenchyma/Pleura: Within normal limits.    Skeleton/soft tissues: Unremarkable.    Impression:    No radiographic evidence of acute cardiopulmonary disease.        EKG:  < from: 12 Lead ECG (05.24.24 @ 07:40) >    Ventricular Rate 83 BPM    Atrial Rate 83 BPM    P-R Interval 130 ms    QRS Duration 102 ms    Q-T Interval 350 ms    QTC Calculation(Bazett) 411 ms    P Axis 44 degrees    R Axis 32 degrees    T Axis -41 degrees    Diagnosis Line Normal sinus rhythm  Possible Inferior infarct , age undetermined  T wave abnormality, consider anterolateral ischemia  Abnormal ECG        Allergies    No Known Allergies    Intolerances      MEDICATIONS  (STANDING):  acetaminophen   IVPB .. 1000 milliGRAM(s) IV Intermittent once  acetaminophen   IVPB .. 1000 milliGRAM(s) IV Intermittent once  aspirin enteric coated 325 milliGRAM(s) Oral daily  atorvastatin 40 milliGRAM(s) Oral at bedtime  chlorhexidine 2% Cloths 1 Application(s) Topical daily  dextrose 10% Bolus 125 milliLiter(s) IV Bolus once  dextrose 5%. 1000 milliLiter(s) (100 mL/Hr) IV Continuous <Continuous>  dextrose 5%. 1000 milliLiter(s) (50 mL/Hr) IV Continuous <Continuous>  famotidine    Tablet 20 milliGRAM(s) Oral two times a day  furosemide   Injectable 20 milliGRAM(s) IV Push every 12 hours  glucagon  Injectable 1 milliGRAM(s) IntraMuscular once  heparin   Injectable 3000 Unit(s) SubCutaneous every 12 hours  metoprolol tartrate 12.5 milliGRAM(s) Oral every 8 hours  potassium chloride    Tablet ER 20 milliEquivalent(s) Oral daily  potassium chloride    Tablet ER 20 milliEquivalent(s) Oral every 2 hours    MEDICATIONS  (PRN):  fentaNYL    Injectable 25 MICROGram(s) IV Push every 4 hours PRN Severe Pain (7 - 10)  ondansetron Injectable 4 milliGRAM(s) IV Push every 4 hours PRN Nausea and/or Vomiting  oxyCODONE    IR 5 milliGRAM(s) Oral every 4 hours PRN Moderate Pain (4 - 6)  oxyCODONE    IR 10 milliGRAM(s) Oral every 4 hours PRN Severe Pain (7 - 10)    Home Medications:  Aspir 81 oral delayed release tablet: 1 tab(s) orally once a day (17 May 2024 14:10)  Lipitor 20 mg oral tablet: 1 tab(s) orally once a day (17 May 2024 14:10)  losartan 50 mg oral tablet: 1 tab(s) orally once a day (17 May 2024 14:10)  Metoprolol Tartrate 25 mg oral tablet: 1 tab(s) orally 2 times a day (17 May 2024 14:10)      Pharmacologic DVT Prophylaxis [X] YES, [] NO: Contraindication:  SCD's: YES b/l    GI Prophylaxis: pepcid    Post-Op Beta-Blockers: [X] Yes, [] No: contraindication:  Post-Op Aspirin:  [X] Yes, [] No: contraindication:  Post-Op Statin:  [X] Yes, [] No: contraindication:    Ambulation/Activity Status: ambulate as tolerated    Assessment/Plan:  63y Male status-post CABG x4 POD#6  - Case and plan discussed with CTU Intensivist and CT Surgeon - Dr. Rodriguez/Tamika/José/Raghav  - Continue CTU supportive care and ongoing plan of care as per continuing CTU rounds.   - Continue DVT/GI prophylaxis  - Incentive Spirometry 10 times an hour  - Continue to advance physical activity as tolerated and continue PT/OT as directed  1. CAD s/p CABG: Continue ASA, statin, BB, pain control as needed.   2. HTN: cont to monitor, start low dose bb   3. Post-op A. Fib ppx: monitor electrolytes, bb started   4. COPD/Hypoxia: cont nebs, wean o2 as tolerated, encourage incentive spirometry, lasix BID  5. DM/Glucose Control: insulin sliding scale     Social Service Disposition: Eliane sent to [facility], awaiting authorization    Social Service Disposition:     OPERATIVE PROCEDURE(s): CABG x4                POD #5                       SURGEON(s): MIGUEL Kumar      SUBJECTIVE ASSESSMENT:63yMale patient seen and examined at bedside. No complaints at this time    Vital Signs Last 24 Hrs  T(F): 98.6 (27 May 2024 04:00), Max: 98.6 (27 May 2024 04:00)  HR: 76 (27 May 2024 07:00) (70 - 96)  BP: 115/72 (27 May 2024 07:00) (93/60 - 140/79)  BP(mean): 92 (27 May 2024 06:00) (69 - 100)  RR: 17 (27 May 2024 07:00) (15 - 36)  SpO2: 93% (27 May 2024 06:00) (93% - 99%)      I&O's Detail    26 May 2024 07:01  -  27 May 2024 07:00  --------------------------------------------------------  IN:    IV PiggyBack: 200 mL    Oral Fluid: 1100 mL  Total IN: 1300 mL    OUT:    Voided (mL): 2680 mL  Total OUT: 2680 mL        Net: I&O's Detail    25 May 2024 07:01  -  26 May 2024 07:00  --------------------------------------------------------  Total NET: -120 mL      26 May 2024 07:01  -  27 May 2024 07:00  --------------------------------------------------------  Total NET: -1380 mL        CAPILLARY BLOOD GLUCOSE        A1C with Estimated Average Glucose Result: 6.0 % (05-18-24 @ 08:08)      Physical Exam:  General: NAD; A&Ox3  Neuro: pupils equal and reactive, speech clear, no overt sensory or motor deficits  Cardiac: S1/S2, RRR, no murmur, no rubs  Lungs: unlabored shallow respirations, decreased in bilateral bases  Abdomen: Soft/NT/ND; positive bowel sounds x 4  Sternum: Intact, no click, incision healing well with no drainage  Incisions: Incisions clean/dry/intact  Extremities: Mild edema b/l lower extremities; good capillary refill; no cyanosis; palpable 1+ pedal pulses b/l    Central Venous Catheter: Yes: critical illness, intravenous access  Day # 6  BOWEL MOVEMENT:  [X] YES [] NO, If No, Timing since last BM Day #      LABS:                        12.2<L>  8.46  )-----------( 218      ( 27 May 2024 01:49 )             37.4<L>                        11.9<L>  7.67  )-----------( 202      ( 26 May 2024 02:00 )             35.5<L>    05-27    139  |  103  |  23<H>  ----------------------------<  104<H>  4.2   |  24  |  1.1  05-26    136  |  102  |  23<H>  ----------------------------<  124<H>  4.5   |  24  |  1.1    Ca    8.8      27 May 2024 01:49  Mg     2.2     05-27    TPro  5.7<L> [6.0 - 8.0]  /  Alb  3.6 [3.5 - 5.2]  /  TBili  0.5 [0.2 - 1.2]  /  DBili  x   /  AST  42<H> [0 - 41]  /  ALT  52<H> [0 - 41]  /  AlkPhos  71 [30 - 115]  05-27      Urinalysis Basic - ( 27 May 2024 01:49 )    Color: x / Appearance: x / SG: x / pH: x  Gluc: 104 mg/dL / Ketone: x  / Bili: x / Urobili: x   Blood: x / Protein: x / Nitrite: x   Leuk Esterase: x / RBC: x / WBC x   Sq Epi: x / Non Sq Epi: x / Bacteria: x        RADIOLOGY & ADDITIONAL TESTS:  CXR:   < from: Xray Chest 1 View- PORTABLE-Routine (Xray Chest 1 View- PORTABLE-Routine in AM.) (05.26.24 @ 05:49) >  INTERPRETATION:  Clinical History / Reason for exam: Dyspnea    Comparison : Chest radiograph 5/22/2024 and 5/25/2024.    Technique/Positioning: Frontal.    Findings:    Support devices: None.    Cardiac/mediastinum/hilum: Deviation of trachea to the right suggesting a   superior mediastinal mass perhaps related to thyroid gland.    Lung parenchyma/Pleura: Within normal limits.    Skeleton/soft tissues: Unremarkable.    Impression:    No radiographic evidence of acute cardiopulmonary disease.        EKG:  < from: 12 Lead ECG (05.24.24 @ 07:40) >    Ventricular Rate 83 BPM    Atrial Rate 83 BPM    P-R Interval 130 ms    QRS Duration 102 ms    Q-T Interval 350 ms    QTC Calculation(Bazett) 411 ms    P Axis 44 degrees    R Axis 32 degrees    T Axis -41 degrees    Diagnosis Line Normal sinus rhythm  Possible Inferior infarct , age undetermined  T wave abnormality, consider anterolateral ischemia  Abnormal ECG        Allergies    No Known Allergies    Intolerances      MEDICATIONS  (STANDING):  acetaminophen   IVPB .. 1000 milliGRAM(s) IV Intermittent once  acetaminophen   IVPB .. 1000 milliGRAM(s) IV Intermittent once  aspirin enteric coated 325 milliGRAM(s) Oral daily  atorvastatin 40 milliGRAM(s) Oral at bedtime  chlorhexidine 2% Cloths 1 Application(s) Topical daily  dextrose 10% Bolus 125 milliLiter(s) IV Bolus once  dextrose 5%. 1000 milliLiter(s) (100 mL/Hr) IV Continuous <Continuous>  dextrose 5%. 1000 milliLiter(s) (50 mL/Hr) IV Continuous <Continuous>  famotidine    Tablet 20 milliGRAM(s) Oral two times a day  furosemide   Injectable 20 milliGRAM(s) IV Push every 12 hours  glucagon  Injectable 1 milliGRAM(s) IntraMuscular once  heparin   Injectable 3000 Unit(s) SubCutaneous every 12 hours  metoprolol tartrate 12.5 milliGRAM(s) Oral every 8 hours  potassium chloride    Tablet ER 20 milliEquivalent(s) Oral daily  potassium chloride    Tablet ER 20 milliEquivalent(s) Oral every 2 hours    MEDICATIONS  (PRN):  fentaNYL    Injectable 25 MICROGram(s) IV Push every 4 hours PRN Severe Pain (7 - 10)  ondansetron Injectable 4 milliGRAM(s) IV Push every 4 hours PRN Nausea and/or Vomiting  oxyCODONE    IR 5 milliGRAM(s) Oral every 4 hours PRN Moderate Pain (4 - 6)  oxyCODONE    IR 10 milliGRAM(s) Oral every 4 hours PRN Severe Pain (7 - 10)    Home Medications:  Aspir 81 oral delayed release tablet: 1 tab(s) orally once a day (17 May 2024 14:10)  Lipitor 20 mg oral tablet: 1 tab(s) orally once a day (17 May 2024 14:10)  losartan 50 mg oral tablet: 1 tab(s) orally once a day (17 May 2024 14:10)  Metoprolol Tartrate 25 mg oral tablet: 1 tab(s) orally 2 times a day (17 May 2024 14:10)      Pharmacologic DVT Prophylaxis [X] YES, [] NO: Contraindication:  SCD's: YES b/l    GI Prophylaxis: pepcid    Post-Op Beta-Blockers: [X] Yes, [] No: contraindication:  Post-Op Aspirin:  [X] Yes, [] No: contraindication:  Post-Op Statin:  [X] Yes, [] No: contraindication:    Ambulation/Activity Status: ambulate as tolerated    Assessment/Plan:  63y Male status-post CABG x4 POD#6  - Case and plan discussed with CTU Intensivist and CT Surgeon - Dr. Rodriguez/Tamika/José/Raghav  - Continue CTU supportive care and ongoing plan of care as per continuing CTU rounds.   - Continue DVT/GI prophylaxis  - Incentive Spirometry 10 times an hour  - Continue to advance physical activity as tolerated and continue PT/OT as directed  1. CAD s/p CABG: Continue ASA, statin, BB, pain control as needed.   2. HTN: currently normotensive, cont BB   3. Post-op A. Fib ppx: monitor electrolytes, BB 12.5 TID  4. COPD/Hypoxia: cont nebs, wean o2 as tolerated, encourage incentive spirometry, lasix BID  5. DM/Glucose Control: insulin sliding scale  - d/c cordis, hold heparin in AM for wire removal    Social Service Disposition: Eliane sent to [facility], awaiting authorization   OPERATIVE PROCEDURE(s): CABG x4                POD #6                       SURGEON(s): MIGUEL Kumar      SUBJECTIVE ASSESSMENT:63yMale patient seen and examined at bedside. No complaints at this time    Vital Signs Last 24 Hrs  T(F): 98.6 (27 May 2024 04:00), Max: 98.6 (27 May 2024 04:00)  HR: 76 (27 May 2024 07:00) (70 - 96)  BP: 115/72 (27 May 2024 07:00) (93/60 - 140/79)  BP(mean): 92 (27 May 2024 06:00) (69 - 100)  RR: 17 (27 May 2024 07:00) (15 - 36)  SpO2: 93% (27 May 2024 06:00) (93% - 99%)      I&O's Detail    26 May 2024 07:01  -  27 May 2024 07:00  --------------------------------------------------------  IN:    IV PiggyBack: 200 mL    Oral Fluid: 1100 mL  Total IN: 1300 mL    OUT:    Voided (mL): 2680 mL  Total OUT: 2680 mL        Net: I&O's Detail    25 May 2024 07:01  -  26 May 2024 07:00  --------------------------------------------------------  Total NET: -120 mL      26 May 2024 07:01  -  27 May 2024 07:00  --------------------------------------------------------  Total NET: -1380 mL        CAPILLARY BLOOD GLUCOSE        A1C with Estimated Average Glucose Result: 6.0 % (05-18-24 @ 08:08)      Physical Exam:  General: NAD; A&Ox3  Neuro: pupils equal and reactive, speech clear, no overt sensory or motor deficits  Cardiac: S1/S2, RRR, no murmur, no rubs  Lungs: unlabored shallow respirations, decreased in bilateral bases  Abdomen: Soft/NT/ND; positive bowel sounds x 4  Sternum: Intact, no click, incision healing well with no drainage  Incisions: Incisions clean/dry/intact  Extremities: Mild edema b/l lower extremities; good capillary refill; no cyanosis; palpable 1+ pedal pulses b/l    Central Venous Catheter: Yes: critical illness, intravenous access  Day # 6  BOWEL MOVEMENT:  [X] YES [] NO, If No, Timing since last BM Day #      LABS:                        12.2<L>  8.46  )-----------( 218      ( 27 May 2024 01:49 )             37.4<L>                        11.9<L>  7.67  )-----------( 202      ( 26 May 2024 02:00 )             35.5<L>    05-27    139  |  103  |  23<H>  ----------------------------<  104<H>  4.2   |  24  |  1.1  05-26    136  |  102  |  23<H>  ----------------------------<  124<H>  4.5   |  24  |  1.1    Ca    8.8      27 May 2024 01:49  Mg     2.2     05-27    TPro  5.7<L> [6.0 - 8.0]  /  Alb  3.6 [3.5 - 5.2]  /  TBili  0.5 [0.2 - 1.2]  /  DBili  x   /  AST  42<H> [0 - 41]  /  ALT  52<H> [0 - 41]  /  AlkPhos  71 [30 - 115]  05-27      Urinalysis Basic - ( 27 May 2024 01:49 )    Color: x / Appearance: x / SG: x / pH: x  Gluc: 104 mg/dL / Ketone: x  / Bili: x / Urobili: x   Blood: x / Protein: x / Nitrite: x   Leuk Esterase: x / RBC: x / WBC x   Sq Epi: x / Non Sq Epi: x / Bacteria: x        RADIOLOGY & ADDITIONAL TESTS:  CXR:   < from: Xray Chest 1 View- PORTABLE-Routine (Xray Chest 1 View- PORTABLE-Routine in AM.) (05.26.24 @ 05:49) >  INTERPRETATION:  Clinical History / Reason for exam: Dyspnea    Comparison : Chest radiograph 5/22/2024 and 5/25/2024.    Technique/Positioning: Frontal.    Findings:    Support devices: None.    Cardiac/mediastinum/hilum: Deviation of trachea to the right suggesting a   superior mediastinal mass perhaps related to thyroid gland.    Lung parenchyma/Pleura: Within normal limits.    Skeleton/soft tissues: Unremarkable.    Impression:    No radiographic evidence of acute cardiopulmonary disease.        EKG:  < from: 12 Lead ECG (05.24.24 @ 07:40) >    Ventricular Rate 83 BPM    Atrial Rate 83 BPM    P-R Interval 130 ms    QRS Duration 102 ms    Q-T Interval 350 ms    QTC Calculation(Bazett) 411 ms    P Axis 44 degrees    R Axis 32 degrees    T Axis -41 degrees    Diagnosis Line Normal sinus rhythm  Possible Inferior infarct , age undetermined  T wave abnormality, consider anterolateral ischemia  Abnormal ECG        Allergies    No Known Allergies    Intolerances      MEDICATIONS  (STANDING):  acetaminophen   IVPB .. 1000 milliGRAM(s) IV Intermittent once  acetaminophen   IVPB .. 1000 milliGRAM(s) IV Intermittent once  aspirin enteric coated 325 milliGRAM(s) Oral daily  atorvastatin 40 milliGRAM(s) Oral at bedtime  chlorhexidine 2% Cloths 1 Application(s) Topical daily  dextrose 10% Bolus 125 milliLiter(s) IV Bolus once  dextrose 5%. 1000 milliLiter(s) (100 mL/Hr) IV Continuous <Continuous>  dextrose 5%. 1000 milliLiter(s) (50 mL/Hr) IV Continuous <Continuous>  famotidine    Tablet 20 milliGRAM(s) Oral two times a day  furosemide   Injectable 20 milliGRAM(s) IV Push every 12 hours  glucagon  Injectable 1 milliGRAM(s) IntraMuscular once  heparin   Injectable 3000 Unit(s) SubCutaneous every 12 hours  metoprolol tartrate 12.5 milliGRAM(s) Oral every 8 hours  potassium chloride    Tablet ER 20 milliEquivalent(s) Oral daily  potassium chloride    Tablet ER 20 milliEquivalent(s) Oral every 2 hours    MEDICATIONS  (PRN):  fentaNYL    Injectable 25 MICROGram(s) IV Push every 4 hours PRN Severe Pain (7 - 10)  ondansetron Injectable 4 milliGRAM(s) IV Push every 4 hours PRN Nausea and/or Vomiting  oxyCODONE    IR 5 milliGRAM(s) Oral every 4 hours PRN Moderate Pain (4 - 6)  oxyCODONE    IR 10 milliGRAM(s) Oral every 4 hours PRN Severe Pain (7 - 10)    Home Medications:  Aspir 81 oral delayed release tablet: 1 tab(s) orally once a day (17 May 2024 14:10)  Lipitor 20 mg oral tablet: 1 tab(s) orally once a day (17 May 2024 14:10)  losartan 50 mg oral tablet: 1 tab(s) orally once a day (17 May 2024 14:10)  Metoprolol Tartrate 25 mg oral tablet: 1 tab(s) orally 2 times a day (17 May 2024 14:10)      Pharmacologic DVT Prophylaxis [X] YES, [] NO: Contraindication:  SCD's: YES b/l    GI Prophylaxis: pepcid    Post-Op Beta-Blockers: [X] Yes, [] No: contraindication:  Post-Op Aspirin:  [X] Yes, [] No: contraindication:  Post-Op Statin:  [X] Yes, [] No: contraindication:    Ambulation/Activity Status: ambulate as tolerated    Assessment/Plan:  63y Male status-post CABG x4 POD#6  - Case and plan discussed with CTU Intensivist and CT Surgeon - Dr. Rodriguez/Tamika/José/Raghav  - Continue CTU supportive care and ongoing plan of care as per continuing CTU rounds.   - Continue DVT/GI prophylaxis  - Incentive Spirometry 10 times an hour  - Continue to advance physical activity as tolerated and continue PT/OT as directed  1. CAD s/p CABG: Continue ASA, statin, BB, pain control as needed.   2. HTN: currently normotensive, cont BB   3. Post-op A. Fib ppx: monitor electrolytes, BB 12.5 TID  4. COPD/Hypoxia: cont nebs, wean o2 as tolerated, encourage incentive spirometry, lasix BID  5. DM/Glucose Control: insulin sliding scale  - d/c cordis, hold heparin in AM for wire removal    Social Service Disposition: Eliane sent to [facility], awaiting authorization

## 2024-05-27 NOTE — PROGRESS NOTE ADULT - SUBJECTIVE AND OBJECTIVE BOX
AVIVA HOFFMAN  MRN#: [id number]  Subjective:  Patient was seen and evalauted on AM rounds offerring no specific compliants at this time.    OBJECTIVE:  ICU Vital Signs Last 24 Hrs  T(C): 37 (27 May 2024 04:00), Max: 37 (27 May 2024 04:00)  T(F): 98.6 (27 May 2024 04:00), Max: 98.6 (27 May 2024 04:00)  HR: 76 (27 May 2024 11:00) (73 - 96)  BP: 98/62 (27 May 2024 11:00) (95/63 - 140/79)  BP(mean): 74 (27 May 2024 11:00) (74 - 100)  ABP: --  ABP(mean): --  RR: 22 (27 May 2024 11:00) (16 - 36)  SpO2: 96% (27 May 2024 11:00) (93% - 99%)    O2 Parameters below as of 27 May 2024 11:00  Patient On (Oxygen Delivery Method): room air             @ 07: @ 07:00  --------------------------------------------------------  IN: 1300 mL / OUT: 2680 mL / NET: -1380 mL     @ 07:  -   @ 11:50  --------------------------------------------------------  IN: 250 mL / OUT: 0 mL / NET: 250 mL      CAPILLARY BLOOD GLUCOSE          PHYSICAL EXAM:Daily     Daily Weight in k.9 (27 May 2024 06:00)  General: WN/WD NAD    HEENT:     + NCAT  + EOMI  - Conjuctival edema   - Icterus   - Thrush   - ETT  - NGT/OGT    Neck:         + FROM    - JVD     - Nodes     - Masses    + Mid-line trachea   - Tracheostomy    Chest:         - Sternal click  - Sternal drainage  + Pacing wires  - Chest tubes  - SubQ emphysema    Lungs:          + CTA   - Rhonchi    - Rales    - Wheezing     - Decreased BS   - Dullness R L    Cardiac:       + S1 + S2    + RRR   - Irregular   - S3  - S4    - Murmurs   - Rub   - Hamman’s sign     Abdomen:    + BS     + Soft    + Non-tender     - Distended    - Organomegaly  - PEG    Extremities:   - Cyanosis U/L   - Clubbing  U/L  - LE/UE Edema   + Capillary refill    + Pulses     Neuro:        + Awake   +  Alert   - Confused   - Lethargic   - Sedated   - Generalized Weakness    Skin:        - Rashes    - Erythema   + Normal incisions   + IV sites intact  - Sacral decubitus    HOSPITAL MEDICATIONS:  MEDICATIONS  (STANDING):  acetaminophen   IVPB .. 1000 milliGRAM(s) IV Intermittent once  acetaminophen   IVPB .. 1000 milliGRAM(s) IV Intermittent once  aspirin enteric coated 325 milliGRAM(s) Oral daily  atorvastatin 40 milliGRAM(s) Oral at bedtime  chlorhexidine 2% Cloths 1 Application(s) Topical daily  dextrose 10% Bolus 125 milliLiter(s) IV Bolus once  dextrose 5%. 1000 milliLiter(s) (100 mL/Hr) IV Continuous <Continuous>  dextrose 5%. 1000 milliLiter(s) (50 mL/Hr) IV Continuous <Continuous>  famotidine    Tablet 20 milliGRAM(s) Oral two times a day  furosemide   Injectable 20 milliGRAM(s) IV Push daily  glucagon  Injectable 1 milliGRAM(s) IntraMuscular once  heparin   Injectable 3000 Unit(s) SubCutaneous every 12 hours  metoprolol tartrate 12.5 milliGRAM(s) Oral every 8 hours  potassium chloride    Tablet ER 20 milliEquivalent(s) Oral daily    MEDICATIONS  (PRN):  fentaNYL    Injectable 25 MICROGram(s) IV Push every 4 hours PRN Severe Pain (7 - 10)  ondansetron Injectable 4 milliGRAM(s) IV Push every 4 hours PRN Nausea and/or Vomiting  oxyCODONE    IR 10 milliGRAM(s) Oral every 4 hours PRN Severe Pain (7 - 10)  oxyCODONE    IR 5 milliGRAM(s) Oral every 4 hours PRN Moderate Pain (4 - 6)      LABS:                        12.2   8.46  )-----------( 218      ( 27 May 2024 01:49 )             37.4    05-27    139  |  103  |  23<H>  ----------------------------<  104<H>  4.2   |  24  |  1.1    Ca    8.8      27 May 2024 01:49  Mg     2.2         TPro  5.7<L>  /  Alb  3.6  /  TBili  0.5  /  DBili  x   /  AST  42<H>  /  ALT  52<H>  /  AlkPhos  71       LIVER FUNCTIONS - ( 27 May 2024 01:49 )  Alb: 3.6 g/dL / Pro: 5.7 g/dL / ALK PHOS: 71 U/L / ALT: 52 U/L / AST: 42 U/L / GGT: x           Urinalysis Basic - ( 27 May 2024 01:49 )    Color: x / Appearance: x / SG: x / pH: x  Gluc: 104 mg/dL / Ketone: x  / Bili: x / Urobili: x   Blood: x / Protein: x / Nitrite: x   Leuk Esterase: x / RBC: x / WBC x   Sq Epi: x / Non Sq Epi: x / Bacteria: x        RADIOLOGY:  X Reviewed and interpreted by me: no focal opacities    CARDIOPULMONARY DYSFUNCTION  - Respiratory status required supplemental oxygen & the following of continuous pulse oximetry for support & to prevent decompensation  - Continued early mobilization as tolerated  - Addressed analgesic regimen to optimize function    PREVENTION-PROPHYLAXIS  - ASA continued for graft occlusion-thromboembolism prophylaxis  - Lipitor was also started for long term graft patency  - Heparin continued for VTE prophylaxis in addition to Venodyne boots  - Pepcid maintained for GI bleeding prophylaxis  - Lopressor continued for atrial fibrillation prophylaxis  - Metabolic stability & infection prophylaxis required review and adjustment of regular Insulin sliding scale and gylcemic regimen while following serial glucose levels to help achieve & maintain euglycemia  - Reviewed & addressed surgical site infection prophylaxis regimen

## 2024-05-28 LAB
ALBUMIN SERPL ELPH-MCNC: 3.7 G/DL — SIGNIFICANT CHANGE UP (ref 3.5–5.2)
ALP SERPL-CCNC: 77 U/L — SIGNIFICANT CHANGE UP (ref 30–115)
ALT FLD-CCNC: 60 U/L — HIGH (ref 0–41)
ANION GAP SERPL CALC-SCNC: 13 MMOL/L — SIGNIFICANT CHANGE UP (ref 7–14)
AST SERPL-CCNC: 40 U/L — SIGNIFICANT CHANGE UP (ref 0–41)
BASOPHILS # BLD AUTO: 0.02 K/UL — SIGNIFICANT CHANGE UP (ref 0–0.2)
BASOPHILS NFR BLD AUTO: 0.2 % — SIGNIFICANT CHANGE UP (ref 0–1)
BILIRUB SERPL-MCNC: 0.4 MG/DL — SIGNIFICANT CHANGE UP (ref 0.2–1.2)
BUN SERPL-MCNC: 26 MG/DL — HIGH (ref 10–20)
CALCIUM SERPL-MCNC: 9 MG/DL — SIGNIFICANT CHANGE UP (ref 8.4–10.5)
CHLORIDE SERPL-SCNC: 101 MMOL/L — SIGNIFICANT CHANGE UP (ref 98–110)
CO2 SERPL-SCNC: 22 MMOL/L — SIGNIFICANT CHANGE UP (ref 17–32)
CREAT SERPL-MCNC: 1.2 MG/DL — SIGNIFICANT CHANGE UP (ref 0.7–1.5)
EGFR: 68 ML/MIN/1.73M2 — SIGNIFICANT CHANGE UP
EOSINOPHIL # BLD AUTO: 0.24 K/UL — SIGNIFICANT CHANGE UP (ref 0–0.7)
EOSINOPHIL NFR BLD AUTO: 2.2 % — SIGNIFICANT CHANGE UP (ref 0–8)
GLUCOSE SERPL-MCNC: 151 MG/DL — HIGH (ref 70–99)
HCT VFR BLD CALC: 37.9 % — LOW (ref 42–52)
HGB BLD-MCNC: 12.7 G/DL — LOW (ref 14–18)
IMM GRANULOCYTES NFR BLD AUTO: 1.3 % — HIGH (ref 0.1–0.3)
LYMPHOCYTES # BLD AUTO: 18.8 % — LOW (ref 20.5–51.1)
LYMPHOCYTES # BLD AUTO: 2.05 K/UL — SIGNIFICANT CHANGE UP (ref 1.2–3.4)
MAGNESIUM SERPL-MCNC: 2.1 MG/DL — SIGNIFICANT CHANGE UP (ref 1.8–2.4)
MCHC RBC-ENTMCNC: 28.3 PG — SIGNIFICANT CHANGE UP (ref 27–31)
MCHC RBC-ENTMCNC: 33.5 G/DL — SIGNIFICANT CHANGE UP (ref 32–37)
MCV RBC AUTO: 84.4 FL — SIGNIFICANT CHANGE UP (ref 80–94)
MONOCYTES # BLD AUTO: 0.84 K/UL — HIGH (ref 0.1–0.6)
MONOCYTES NFR BLD AUTO: 7.7 % — SIGNIFICANT CHANGE UP (ref 1.7–9.3)
NEUTROPHILS # BLD AUTO: 7.63 K/UL — HIGH (ref 1.4–6.5)
NEUTROPHILS NFR BLD AUTO: 69.8 % — SIGNIFICANT CHANGE UP (ref 42.2–75.2)
NRBC # BLD: 0 /100 WBCS — SIGNIFICANT CHANGE UP (ref 0–0)
PLATELET # BLD AUTO: 241 K/UL — SIGNIFICANT CHANGE UP (ref 130–400)
PMV BLD: 9.3 FL — SIGNIFICANT CHANGE UP (ref 7.4–10.4)
POTASSIUM SERPL-MCNC: 4.1 MMOL/L — SIGNIFICANT CHANGE UP (ref 3.5–5)
POTASSIUM SERPL-SCNC: 4.1 MMOL/L — SIGNIFICANT CHANGE UP (ref 3.5–5)
PROT SERPL-MCNC: 5.8 G/DL — LOW (ref 6–8)
RBC # BLD: 4.49 M/UL — LOW (ref 4.7–6.1)
RBC # FLD: 13.2 % — SIGNIFICANT CHANGE UP (ref 11.5–14.5)
SODIUM SERPL-SCNC: 136 MMOL/L — SIGNIFICANT CHANGE UP (ref 135–146)
WBC # BLD: 10.92 K/UL — HIGH (ref 4.8–10.8)
WBC # FLD AUTO: 10.92 K/UL — HIGH (ref 4.8–10.8)

## 2024-05-28 PROCEDURE — 71045 X-RAY EXAM CHEST 1 VIEW: CPT | Mod: 26,59

## 2024-05-28 PROCEDURE — 93010 ELECTROCARDIOGRAM REPORT: CPT

## 2024-05-28 PROCEDURE — 99232 SBSQ HOSP IP/OBS MODERATE 35: CPT

## 2024-05-28 PROCEDURE — 71046 X-RAY EXAM CHEST 2 VIEWS: CPT | Mod: 26

## 2024-05-28 RX ORDER — POTASSIUM CHLORIDE 20 MEQ
40 PACKET (EA) ORAL ONCE
Refills: 0 | Status: COMPLETED | OUTPATIENT
Start: 2024-05-28 | End: 2024-05-28

## 2024-05-28 RX ADMIN — FAMOTIDINE 20 MILLIGRAM(S): 10 INJECTION INTRAVENOUS at 17:50

## 2024-05-28 RX ADMIN — FAMOTIDINE 20 MILLIGRAM(S): 10 INJECTION INTRAVENOUS at 05:12

## 2024-05-28 RX ADMIN — ATORVASTATIN CALCIUM 40 MILLIGRAM(S): 80 TABLET, FILM COATED ORAL at 21:50

## 2024-05-28 RX ADMIN — Medication 1000 MILLIGRAM(S): at 00:00

## 2024-05-28 RX ADMIN — CHLORHEXIDINE GLUCONATE 1 APPLICATION(S): 213 SOLUTION TOPICAL at 05:12

## 2024-05-28 RX ADMIN — Medication 12.5 MILLIGRAM(S): at 14:14

## 2024-05-28 RX ADMIN — Medication 81 MILLIGRAM(S): at 11:06

## 2024-05-28 RX ADMIN — Medication 20 MILLIGRAM(S): at 05:13

## 2024-05-28 RX ADMIN — Medication 12.5 MILLIGRAM(S): at 05:13

## 2024-05-28 RX ADMIN — Medication 1000 MILLIGRAM(S): at 06:05

## 2024-05-28 RX ADMIN — HEPARIN SODIUM 3000 UNIT(S): 5000 INJECTION INTRAVENOUS; SUBCUTANEOUS at 17:50

## 2024-05-28 RX ADMIN — CLOPIDOGREL BISULFATE 75 MILLIGRAM(S): 75 TABLET, FILM COATED ORAL at 11:06

## 2024-05-28 RX ADMIN — Medication 12.5 MILLIGRAM(S): at 21:50

## 2024-05-28 RX ADMIN — Medication 20 MILLIEQUIVALENT(S): at 11:06

## 2024-05-28 RX ADMIN — Medication 40 MILLIEQUIVALENT(S): at 06:03

## 2024-05-28 RX ADMIN — Medication 400 MILLIGRAM(S): at 05:13

## 2024-05-28 NOTE — PROGRESS NOTE ADULT - SUBJECTIVE AND OBJECTIVE BOX
OPERATIVE PROCEDURE(s):                POD #                       63yMale  SURGEON(s): MIGUEL Kumar  SUBJECTIVE ASSESSMENT: pt seen and examined. no acute complaints   Vital Signs Last 24 Hrs  T(F): 98.2 (28 May 2024 08:00), Max: 98.6 (27 May 2024 23:00)  HR: 77 (28 May 2024 08:00) (71 - 87)  BP: 101/67 (28 May 2024 08:00) (93/62 - 127/81)  BP(mean): 81 (28 May 2024 08:00) (72 - 99)  RR: 26 (28 May 2024 08:00) (18 - 42)  SpO2: 98% (28 May 2024 08:00) (95% - 99%)      I&O's Detail    27 May 2024 07:01  -  28 May 2024 07:00  --------------------------------------------------------  IN:    Oral Fluid: 1170 mL  Total IN: 1170 mL    OUT:    Voided (mL): 500 mL  Total OUT: 500 mL        Net:   I&O's Detail    26 May 2024 07:01  -  27 May 2024 07:00  --------------------------------------------------------  Total NET: -1380 mL      27 May 2024 07:01  -  28 May 2024 07:00  --------------------------------------------------------  Total NET: 670 mL        CAPILLARY BLOOD GLUCOSE        Physical Exam:  General: NAD; A&Ox3/Patient is intubated and sedated  Cardiac: S1/S2, RRR, no murmur, no rubs  Lungs: unlabored respirations, CTA b/l, no wheeze, no rales, no crackles  Abdomen: Soft/NT/ND; positive bowel sounds x 4  Sternum: Intact, no click, incision healing well with no drainage  Incisions: Incisions clean/dry/intact  Extremities: No edema b/l lower extremities; good capillary refill; no cyanosis; palpable 1+ pedal pulses b/l    Central Venous Catheter: Yes[]  No[] , If Yes indication:           Day #  Zafar Catheter: Yes  [] , No  [] , If yes indication:                      Day #  NGT: Yes [] No [] ,    If Yes Placement:                                     Day #  EPICARDIAL WIRES:  [] YES [] NO                                              Day #  BOWEL MOVEMENT:  [] YES [] NO, If No, Timing since last BM:      Day #  CHEST TUBE(Left/Right):  [] YES [] NO, If yes -  AIR LEAKS:  [] YES [] NO        LABS:                        12.7<L>  10.92<H> )-----------( 241      ( 28 May 2024 01:49 )             37.9<L>                        12.2<L>  8.46  )-----------( 218      ( 27 May 2024 01:49 )             37.4<L>    05-28    136  |  101  |  26<H>  ----------------------------<  151<H>  4.1   |  22  |  1.2  05-27    133<L>  |  97<L>  |  28<H>  ----------------------------<  143<H>  4.8   |  23  |  1.3    Ca    9.0      28 May 2024 01:49  Mg     2.1     05-28    TPro  5.8<L> [6.0 - 8.0]  /  Alb  3.7 [3.5 - 5.2]  /  TBili  0.4 [0.2 - 1.2]  /  DBili  x   /  AST  40 [0 - 41]  /  ALT  60<H> [0 - 41]  /  AlkPhos  77 [30 - 115]  05-28      Urinalysis Basic - ( 28 May 2024 01:49 )    Color: x / Appearance: x / SG: x / pH: x  Gluc: 151 mg/dL / Ketone: x  / Bili: x / Urobili: x   Blood: x / Protein: x / Nitrite: x   Leuk Esterase: x / RBC: x / WBC x   Sq Epi: x / Non Sq Epi: x / Bacteria: x        RADIOLOGY & ADDITIONAL TESTS:  CXR:  EKG:  MEDICATIONS  (STANDING):  aspirin enteric coated 81 milliGRAM(s) Oral daily  atorvastatin 40 milliGRAM(s) Oral at bedtime  chlorhexidine 2% Cloths 1 Application(s) Topical daily  clopidogrel Tablet 75 milliGRAM(s) Oral daily  dextrose 10% Bolus 125 milliLiter(s) IV Bolus once  dextrose 5%. 1000 milliLiter(s) (50 mL/Hr) IV Continuous <Continuous>  dextrose 5%. 1000 milliLiter(s) (100 mL/Hr) IV Continuous <Continuous>  famotidine    Tablet 20 milliGRAM(s) Oral two times a day  furosemide   Injectable 20 milliGRAM(s) IV Push daily  glucagon  Injectable 1 milliGRAM(s) IntraMuscular once  heparin   Injectable 3000 Unit(s) SubCutaneous every 12 hours  metoprolol tartrate 12.5 milliGRAM(s) Oral every 8 hours  potassium chloride    Tablet ER 20 milliEquivalent(s) Oral daily    MEDICATIONS  (PRN):  fentaNYL    Injectable 25 MICROGram(s) IV Push every 4 hours PRN Severe Pain (7 - 10)  ondansetron Injectable 4 milliGRAM(s) IV Push every 4 hours PRN Nausea and/or Vomiting  oxyCODONE    IR 10 milliGRAM(s) Oral every 4 hours PRN Severe Pain (7 - 10)  oxyCODONE    IR 5 milliGRAM(s) Oral every 4 hours PRN Moderate Pain (4 - 6)    HEPARIN:  [] YES [] NO  Dose: XX UNITS/HR UNITS Q8H  LOVENOX:[] YES [] NO  Dose: XX mg Q24H  COUMADIN: []  YES [] NO  Dose: XX mg  Q24H  SCD's: YES b/l  GI Prophylaxis: Protonix [], Pepcid [], None [], (Contra-indication:.....)    Post-Op Beta-Blockers: Yes [], No[], If No, then contraindication:  Post-Op Aspirin: Yes [],  No [], If No, then contraindication:  Post-Op Statin: Yes [], No[], If No, then contraindication:  Allergies    No Known Allergies    Intolerances      Ambulation/Activity Status:    Assessment/Plan:  63y Male status-post .....  - Case and plan discussed with CTU Intensivist and CT Surgeon - Dr. Rodriguez/Tamika/José  - Continue CTU supportive care    - Continue DVT/GI prophylaxis  - Incentive Spirometry 10 times an hour  - Continue to advance physical activity as tolerated and continue PT/OT as directed  1. CAD: Continue ASA, statin, BB  2. HTN:   3. A. Fib:   4. COPD/Hypoxia:   5. DM/Glucose Control:     Social Service Disposition:     OPERATIVE PROCEDURE(s):  CABG x4              POD # 7                    63yMale  SURGEON(s): MIGUEL Kumar  SUBJECTIVE ASSESSMENT: pt seen and examined. no acute complaints   Vital Signs Last 24 Hrs  T(F): 98.2 (28 May 2024 08:00), Max: 98.6 (27 May 2024 23:00)  HR: 77 (28 May 2024 08:00) (71 - 87)  BP: 101/67 (28 May 2024 08:00) (93/62 - 127/81)  BP(mean): 81 (28 May 2024 08:00) (72 - 99)  RR: 26 (28 May 2024 08:00) (18 - 42)  SpO2: 98% (28 May 2024 08:00) (95% - 99%)      I&O's Detail    27 May 2024 07:01  -  28 May 2024 07:00  --------------------------------------------------------  IN:    Oral Fluid: 1170 mL  Total IN: 1170 mL    OUT:    Voided (mL): 500 mL  Total OUT: 500 mL        Net:   I&O's Detail    26 May 2024 07:01  -  27 May 2024 07:00  --------------------------------------------------------  Total NET: -1380 mL      27 May 2024 07:01  -  28 May 2024 07:00  --------------------------------------------------------  Total NET: 670 mL        CAPILLARY BLOOD GLUCOSE        Physical Exam:  General: NAD; A&Ox3  Neuro: pupils equal and reactive, speech clear, no overt sensory or motor deficits  Cardiac: S1/S2, RRR, no murmur, no rubs  Lungs: unlabored shallow respirations, decreased in bilateral bases  Abdomen: Soft/NT/ND; positive bowel sounds x 4  Sternum: Intact, no click, incision healing well with no drainage  Incisions: Incisions clean/dry/intact  Extremities: Mild edema b/l lower extremities; good capillary refill; no cyanosis; palpable 1+ pedal pulses b/l    Central Venous Catheter: Yes: critical illness, intravenous access  Day # 6  BOWEL MOVEMENT:  [X] YES [] NO, If No, Timing since last BM Day #      LABS:                        12.7<L>  10.92<H> )-----------( 241      ( 28 May 2024 01:49 )             37.9<L>                        12.2<L>  8.46  )-----------( 218      ( 27 May 2024 01:49 )             37.4<L>    05-28    136  |  101  |  26<H>  ----------------------------<  151<H>  4.1   |  22  |  1.2  05-27    133<L>  |  97<L>  |  28<H>  ----------------------------<  143<H>  4.8   |  23  |  1.3    Ca    9.0      28 May 2024 01:49  Mg     2.1     05-28    TPro  5.8<L> [6.0 - 8.0]  /  Alb  3.7 [3.5 - 5.2]  /  TBili  0.4 [0.2 - 1.2]  /  DBili  x   /  AST  40 [0 - 41]  /  ALT  60<H> [0 - 41]  /  AlkPhos  77 [30 - 115]  05-28      Urinalysis Basic - ( 28 May 2024 01:49 )    Color: x / Appearance: x / SG: x / pH: x  Gluc: 151 mg/dL / Ketone: x  / Bili: x / Urobili: x   Blood: x / Protein: x / Nitrite: x   Leuk Esterase: x / RBC: x / WBC x   Sq Epi: x / Non Sq Epi: x / Bacteria: x        RADIOLOGY & ADDITIONAL TESTS:  CXR: < from: Xray Chest 1 View- PORTABLE-Routine (Xray Chest 1 View- PORTABLE-Routine in AM.) (05.28.24 @ 06:45) >  IMPRESSION:    No radiographic evidence of acute cardiopulmonary disease.    < end of copied text >    EKG: < from: 12 Lead ECG (05.27.24 @ 07:10) >    Ventricular Rate 73 BPM    Atrial Rate 73 BPM    P-R Interval 114 ms    QRS Duration 100 ms    Q-T Interval 368 ms    QTC Calculation(Bazett) 405 ms    P Axis -20 degrees    R Axis 53 degrees    T Axis 195 degrees    Diagnosis Line Normal sinus rhythm  Inferior infarct , age undetermined  T wave abnormality, consider anterolateral ischemia  Abnormal ECG    < end of copied text >    MEDICATIONS  (STANDING):  aspirin enteric coated 81 milliGRAM(s) Oral daily  atorvastatin 40 milliGRAM(s) Oral at bedtime  chlorhexidine 2% Cloths 1 Application(s) Topical daily  clopidogrel Tablet 75 milliGRAM(s) Oral daily  dextrose 10% Bolus 125 milliLiter(s) IV Bolus once  dextrose 5%. 1000 milliLiter(s) (50 mL/Hr) IV Continuous <Continuous>  dextrose 5%. 1000 milliLiter(s) (100 mL/Hr) IV Continuous <Continuous>  famotidine    Tablet 20 milliGRAM(s) Oral two times a day  furosemide   Injectable 20 milliGRAM(s) IV Push daily  glucagon  Injectable 1 milliGRAM(s) IntraMuscular once  heparin   Injectable 3000 Unit(s) SubCutaneous every 12 hours  metoprolol tartrate 12.5 milliGRAM(s) Oral every 8 hours  potassium chloride    Tablet ER 20 milliEquivalent(s) Oral daily    MEDICATIONS  (PRN):  fentaNYL    Injectable 25 MICROGram(s) IV Push every 4 hours PRN Severe Pain (7 - 10)  ondansetron Injectable 4 milliGRAM(s) IV Push every 4 hours PRN Nausea and/or Vomiting  oxyCODONE    IR 10 milliGRAM(s) Oral every 4 hours PRN Severe Pain (7 - 10)  oxyCODONE    IR 5 milliGRAM(s) Oral every 4 hours PRN Moderate Pain (4 - 6)    HEPARIN:  [x] YES [] NO  Dose: 3000 UNITS Q12H    SCD's: YES b/l  GI Prophylaxis: Protonix [], Pepcid [x], None [], (Contra-indication:.....)    Post-Op Beta-Blockers: Yes [x], No[], If No, then contraindication:  Post-Op Aspirin: Yes [x],  No [], If No, then contraindication:  Post-Op Statin: Yes [x], No[], If No, then contraindication:  Allergies    No Known Allergies    Intolerances      Ambulation/Activity Status: ambulate     Assessment/Plan:  63y Male status-post .....  - Case and plan discussed with CTU Intensivist and CT Surgeon - Dr. Rodriguez/Tamika/José  - Continue CTU supportive care    - Continue DVT/GI prophylaxis  - Incentive Spirometry 10 times an hour  - Continue to advance physical activity as tolerated and continue PT/OT as directed  1. CAD: Continue ASA, statin, BB  2. HTN:   3. A. Fib:   4. COPD/Hypoxia:   5. DM/Glucose Control:     Social Service Disposition:     OPERATIVE PROCEDURE(s):  CABG x4              POD # 7                    63yMale  SURGEON(s): MIGUEL Kumar  SUBJECTIVE ASSESSMENT: pt seen and examined. no acute complaints   Vital Signs Last 24 Hrs  T(F): 98.2 (28 May 2024 08:00), Max: 98.6 (27 May 2024 23:00)  HR: 77 (28 May 2024 08:00) (71 - 87)  BP: 101/67 (28 May 2024 08:00) (93/62 - 127/81)  BP(mean): 81 (28 May 2024 08:00) (72 - 99)  RR: 26 (28 May 2024 08:00) (18 - 42)  SpO2: 98% (28 May 2024 08:00) (95% - 99%)      I&O's Detail    27 May 2024 07:01  -  28 May 2024 07:00  --------------------------------------------------------  IN:    Oral Fluid: 1170 mL  Total IN: 1170 mL    OUT:    Voided (mL): 500 mL  Total OUT: 500 mL        Net:   I&O's Detail    26 May 2024 07:01  -  27 May 2024 07:00  --------------------------------------------------------  Total NET: -1380 mL      27 May 2024 07:01  -  28 May 2024 07:00  --------------------------------------------------------  Total NET: 670 mL        CAPILLARY BLOOD GLUCOSE        Physical Exam:  General: NAD; A&Ox3  Neuro: pupils equal and reactive, speech clear, no overt sensory or motor deficits  Cardiac: S1/S2, RRR, no murmur, no rubs  Lungs: unlabored shallow respirations, decreased in bilateral bases  Abdomen: Soft/NT/ND; positive bowel sounds x 4  Sternum: Intact, no click, incision healing well with no drainage  Incisions: Incisions clean/dry/intact  Extremities: Mild edema b/l lower extremities; good capillary refill; no cyanosis; palpable 1+ pedal pulses b/l    Central Venous Catheter: Yes: critical illness, intravenous access  Day # 6  BOWEL MOVEMENT:  [X] YES [] NO, If No, Timing since last BM Day #      LABS:                        12.7<L>  10.92<H> )-----------( 241      ( 28 May 2024 01:49 )             37.9<L>                        12.2<L>  8.46  )-----------( 218      ( 27 May 2024 01:49 )             37.4<L>    05-28    136  |  101  |  26<H>  ----------------------------<  151<H>  4.1   |  22  |  1.2  05-27    133<L>  |  97<L>  |  28<H>  ----------------------------<  143<H>  4.8   |  23  |  1.3    Ca    9.0      28 May 2024 01:49  Mg     2.1     05-28    TPro  5.8<L> [6.0 - 8.0]  /  Alb  3.7 [3.5 - 5.2]  /  TBili  0.4 [0.2 - 1.2]  /  DBili  x   /  AST  40 [0 - 41]  /  ALT  60<H> [0 - 41]  /  AlkPhos  77 [30 - 115]  05-28      Urinalysis Basic - ( 28 May 2024 01:49 )    Color: x / Appearance: x / SG: x / pH: x  Gluc: 151 mg/dL / Ketone: x  / Bili: x / Urobili: x   Blood: x / Protein: x / Nitrite: x   Leuk Esterase: x / RBC: x / WBC x   Sq Epi: x / Non Sq Epi: x / Bacteria: x        RADIOLOGY & ADDITIONAL TESTS:  CXR: < from: Xray Chest 1 View- PORTABLE-Routine (Xray Chest 1 View- PORTABLE-Routine in AM.) (05.28.24 @ 06:45) >  IMPRESSION:    No radiographic evidence of acute cardiopulmonary disease.    < end of copied text >    EKG: < from: 12 Lead ECG (05.27.24 @ 07:10) >    Ventricular Rate 73 BPM    Atrial Rate 73 BPM    P-R Interval 114 ms    QRS Duration 100 ms    Q-T Interval 368 ms    QTC Calculation(Bazett) 405 ms    P Axis -20 degrees    R Axis 53 degrees    T Axis 195 degrees    Diagnosis Line Normal sinus rhythm  Inferior infarct , age undetermined  T wave abnormality, consider anterolateral ischemia  Abnormal ECG    < end of copied text >    MEDICATIONS  (STANDING):  aspirin enteric coated 81 milliGRAM(s) Oral daily  atorvastatin 40 milliGRAM(s) Oral at bedtime  chlorhexidine 2% Cloths 1 Application(s) Topical daily  clopidogrel Tablet 75 milliGRAM(s) Oral daily  dextrose 10% Bolus 125 milliLiter(s) IV Bolus once  dextrose 5%. 1000 milliLiter(s) (50 mL/Hr) IV Continuous <Continuous>  dextrose 5%. 1000 milliLiter(s) (100 mL/Hr) IV Continuous <Continuous>  famotidine    Tablet 20 milliGRAM(s) Oral two times a day  furosemide   Injectable 20 milliGRAM(s) IV Push daily  glucagon  Injectable 1 milliGRAM(s) IntraMuscular once  heparin   Injectable 3000 Unit(s) SubCutaneous every 12 hours  metoprolol tartrate 12.5 milliGRAM(s) Oral every 8 hours  potassium chloride    Tablet ER 20 milliEquivalent(s) Oral daily    MEDICATIONS  (PRN):  fentaNYL    Injectable 25 MICROGram(s) IV Push every 4 hours PRN Severe Pain (7 - 10)  ondansetron Injectable 4 milliGRAM(s) IV Push every 4 hours PRN Nausea and/or Vomiting  oxyCODONE    IR 10 milliGRAM(s) Oral every 4 hours PRN Severe Pain (7 - 10)  oxyCODONE    IR 5 milliGRAM(s) Oral every 4 hours PRN Moderate Pain (4 - 6)    HEPARIN:  [x] YES [] NO  Dose: 3000 UNITS Q12H    SCD's: YES b/l  GI Prophylaxis: Protonix [], Pepcid [x], None [], (Contra-indication:.....)    Post-Op Beta-Blockers: Yes [x], No[], If No, then contraindication:  Post-Op Aspirin: Yes [x],  No [], If No, then contraindication:  Post-Op Statin: Yes [x], No[], If No, then contraindication:  Allergies    No Known Allergies    Intolerances      Ambulation/Activity Status: ambulate     Assessment/Plan:  63y Male status-post CABG x4 POD#7  - Case and plan discussed with CTU Intensivist and CT Surgeon - Dr. Rodriguez/Tamika/José/Raghav  - Continue CTU supportive care and ongoing plan of care as per continuing CTU rounds.   - Continue DVT/GI prophylaxis  - Incentive Spirometry 10 times an hour  - Continue to advance physical activity as tolerated and continue PT/OT as directed  1. CAD s/p CABG: Continue ASA, plavix statin, BB, pain control as needed.   2. HTN: currently normotensive, cont BB   3. Post-op A. Fib ppx: monitor electrolytes, BB 12.5 TID  4. COPD/Hypoxia: cont nebs, wean o2 as tolerated, encourage incentive spirometry, lasix BID  5. DM/Glucose Control: insulin sliding scale      Social Service Disposition: Eliane sent to [facility], awaiting authorization

## 2024-05-28 NOTE — PROGRESS NOTE ADULT - ASSESSMENT
Assessment/Plan:  CAD-s/p CABG x 4-POD #2  1-BP control-beta-blockers, increase as tolerated  2-serum glucose control-insulin sliding scale correction regimen  3-fluid overload-diuresis  4-HLD-statin  5-acute blood loss anemia-stable, monitor Hb/Hct daily  8-yxdwkpffuneqqnfv-wpecyv, monitor plts daily  7-monitor chest tube output-possible removal today    40 minutes of critical care services provided
Assessment/Plan:  CAD-s/p CABG x 4-POD #6  1-BP control-beta-blockers, increase as tolerated  2-serum glucose control-insulin sliding scale correction regimen  3-fluid overload-diuresis  4-HLD-statin  5-acute blood loss anemia-stable, monitor Hb/Hct daily  4-mwxsmjcvaomocein-srlqie, monitor plts daily  0-ffpcudgedvyfali-uoxsxe, monitor daily    35 minutes of critical care services provided
Assessment/Plan:  CAD-s/p CABG x 4-POD #4  1-BP control-beta-blockers, increase as tolerated  2-serum glucose control-insulin sliding scale correction regimen  3-fluid overload-diuresis  4-HLD-statin  5-acute blood loss anemia-stable, monitor Hb/Hct daily  7-yjdeechjnpezpwce-fhyunj, monitor plts daily    35 minutes of critical care services provided
IMPRESSION:  CAD s/p CABG c4L  fluid overload  acute blood loss anemia(stable)    PLAN:    CNS: pain control with tylenol IV    HEENT:  Oral care    PULMONARY:  HOB @ 45 degrees, chets PT, incentive spirometry    CARDIOVASCULAR: asa/plavix/lipitor/BB/Furosemide IV.  maintain -ve balance  dc wires prior to discharge  GI: GI prophylaxis                                          Feeding po diet    RENAL:  F/u  lytes.  Correct as needed. accurate I/O    INFECTIOUS DISEASE: off abx    HEMATOLOGICAL:  DVT prophylaxis.    ENDOCRINE:  Follow up FS.      MUSCULOSKELETAL: ambulate     CODE STATUS: FULL CODE    DISPOSITION: dc planning   case discussed with ct surgeon
CT surgery attending note    Patient seen and examined on morning rounds as described    Postop day 1  Status post urgent coronary artery bypass grafting surgery  Doing very well  Extubated just a couple of hours postoperatively yesterday evening    Hemodynamically stable  Neurologically intact    Overall he is making excellent progress  The patient, the family and the care team updated regarding the plan and progress
IMPRESSION:  CAD s/p CABG c4L  hypoxia on low flow oxygen  fluid overload  acute blood loss anemia(stable)    PLAN:    CNS: pain control with tylenol IV    HEENT:  Oral care    PULMONARY:  HOB @ 45 degrees, chets PT, incentive spirometry    CARDIOVASCULAR: asa/lipitor/BB/Furosemide IV.  maintain -ve balance    GI: GI prophylaxis                                          Feeding po diet    RENAL:  F/u  lytes.  Correct as needed. accurate I/O    INFECTIOUS DISEASE: off abx    HEMATOLOGICAL:  DVT prophylaxis.    ENDOCRINE:  Follow up FS.      MUSCULOSKELETAL: ambulate     CODE STATUS: FULL CODE    DISPOSITION: Pt requires continued monitoring in the CTU
CT surgery attending note    Patient seen and examined on morning rounds as described above    Postop day 3  Status post urgent coronary artery bypass grafting    Doing very well  Ambulating independently    All chest tubes and Zafar catheter and arterial line have been removed    Hemodynamically stable    Overall the patient is making excellent progress    The patient lives alone and it would be difficult for him to manage on his own and therefore we have requested the  to help us with his disposition.    The patient and his family and the care team updated regarding the plan and progress
CT surgery attending note    Patient seen and examined on rounds as described    Postop day 2  Status post urgent coronary artery bypass grafting surgery    Doing very well  Hemodynamically stable  Ambulating the hallways    Incisions are clean and dry and healing well    Overall the patient is making excellent progress    The patient, the family and the care team updated regarding the plan and progress
IMPRESSION:  CAD s/p CABG c4L  hypoxia on low flow oxygen  fluid overload  acute blood loss anemia(stable)    PLAN:    CNS: pain control with tylenol IV    HEENT:  Oral care    PULMONARY:  HOB @ 45 degrees, chets PT, incentive spirometry    CARDIOVASCULAR: asa/lipitor/BB/Furosemide IV/    GI: GI prophylaxis                                          Feeding po diet    RENAL:  F/u  lytes.  Correct as needed. accurate I/O    INFECTIOUS DISEASE: off abx    HEMATOLOGICAL:  DVT prophylaxis.    ENDOCRINE:  Follow up FS.      MUSCULOSKELETAL: ambulate     CODE STATUS: FULL CODE    DISPOSITION: Pt requires continued monitoring in the CTU    
CT surgery attending note    Patient seen and examined on morning rounds as described above    Postop day 7  Status post urgent coronary artery bypass grafting surgery    Doing very well  Hemodynamically stable  Ambulating the hallways  Can go up and down stairs easily    The only reason the patient is staying in the hospital because we have no place to discharge the patient    He is being followed by the  team for discharge planning over the last few days    He has a sister who feels that she cannot look after the patient by herself and she herself works in the medical environment.    From a cardiac surgery point of view the patient seems to be making an excellent recovery.  His incisions are clean and dry and healing well and the sternum is stable.    The patient, the family and the care team updated regarding the plan and progress.

## 2024-05-28 NOTE — PROGRESS NOTE ADULT - SUBJECTIVE AND OBJECTIVE BOX
Over Night Events:  no major events       ROS:  See HPI    PHYSICAL EXAM    ICU Vital Signs Last 24 Hrs  T(C): 36.8 (28 May 2024 08:00), Max: 37 (27 May 2024 23:00)  T(F): 98.2 (28 May 2024 08:00), Max: 98.6 (27 May 2024 23:00)  HR: 77 (28 May 2024 08:00) (71 - 87)  BP: 101/67 (28 May 2024 08:00) (93/62 - 127/81)  BP(mean): 81 (28 May 2024 08:00) (72 - 99)  RR: 26 (28 May 2024 08:00) (18 - 42)  SpO2: 98% (28 May 2024 08:00) (95% - 99%)    O2 Parameters below as of 28 May 2024 08:00  Patient On (Oxygen Delivery Method): room air            General: NARD  HEENT: JUDIT             Lymph Nodes: No cervical LN   Lungs: Bilateral BS  Cardiovascular: Regular   Abdomen: Soft, Positive BS  Extremities: LE edema   Skin: Warm  Neurological: Non focal       05-27-24 @ 07:01  -  05-28-24 @ 07:00  --------------------------------------------------------  IN:    Oral Fluid: 1170 mL  Total IN: 1170 mL    OUT:    Voided (mL): 500 mL  Total OUT: 500 mL    Total NET: 670 mL          LABS:                          12.7   10.92 )-----------( 241      ( 28 May 2024 01:49 )             37.9                                               05-28    136  |  101  |  26<H>  ----------------------------<  151<H>  4.1   |  22  |  1.2    Ca    9.0      28 May 2024 01:49  Mg     2.1     05-28    TPro  5.8<L>  /  Alb  3.7  /  TBili  0.4  /  DBili  x   /  AST  40  /  ALT  60<H>  /  AlkPhos  77  05-28                                             Urinalysis Basic - ( 28 May 2024 01:49 )    Color: x / Appearance: x / SG: x / pH: x  Gluc: 151 mg/dL / Ketone: x  / Bili: x / Urobili: x   Blood: x / Protein: x / Nitrite: x   Leuk Esterase: x / RBC: x / WBC x   Sq Epi: x / Non Sq Epi: x / Bacteria: x                                                  LIVER FUNCTIONS - ( 28 May 2024 01:49 )  Alb: 3.7 g/dL / Pro: 5.8 g/dL / ALK PHOS: 77 U/L / ALT: 60 U/L / AST: 40 U/L / GGT: x                                                                                                                                       MEDICATIONS  (STANDING):  aspirin enteric coated 81 milliGRAM(s) Oral daily  atorvastatin 40 milliGRAM(s) Oral at bedtime  chlorhexidine 2% Cloths 1 Application(s) Topical daily  clopidogrel Tablet 75 milliGRAM(s) Oral daily  dextrose 10% Bolus 125 milliLiter(s) IV Bolus once  dextrose 5%. 1000 milliLiter(s) (50 mL/Hr) IV Continuous <Continuous>  dextrose 5%. 1000 milliLiter(s) (100 mL/Hr) IV Continuous <Continuous>  famotidine    Tablet 20 milliGRAM(s) Oral two times a day  furosemide   Injectable 20 milliGRAM(s) IV Push daily  glucagon  Injectable 1 milliGRAM(s) IntraMuscular once  heparin   Injectable 3000 Unit(s) SubCutaneous every 12 hours  metoprolol tartrate 12.5 milliGRAM(s) Oral every 8 hours  potassium chloride    Tablet ER 20 milliEquivalent(s) Oral daily    MEDICATIONS  (PRN):  fentaNYL    Injectable 25 MICROGram(s) IV Push every 4 hours PRN Severe Pain (7 - 10)  ondansetron Injectable 4 milliGRAM(s) IV Push every 4 hours PRN Nausea and/or Vomiting  oxyCODONE    IR 5 milliGRAM(s) Oral every 4 hours PRN Moderate Pain (4 - 6)  oxyCODONE    IR 10 milliGRAM(s) Oral every 4 hours PRN Severe Pain (7 - 10)      Xrays:                                                                                     ECHO  no opacities

## 2024-05-29 ENCOUNTER — TRANSCRIPTION ENCOUNTER (OUTPATIENT)
Age: 64
End: 2024-05-29

## 2024-05-29 VITALS
HEART RATE: 85 BPM | OXYGEN SATURATION: 98 % | DIASTOLIC BLOOD PRESSURE: 64 MMHG | RESPIRATION RATE: 39 BRPM | SYSTOLIC BLOOD PRESSURE: 113 MMHG

## 2024-05-29 PROBLEM — N18.9 CHRONIC KIDNEY DISEASE, UNSPECIFIED: Chronic | Status: ACTIVE | Noted: 2024-05-17

## 2024-05-29 PROBLEM — E78.5 HYPERLIPIDEMIA, UNSPECIFIED: Chronic | Status: ACTIVE | Noted: 2024-05-16

## 2024-05-29 PROBLEM — I10 ESSENTIAL (PRIMARY) HYPERTENSION: Chronic | Status: ACTIVE | Noted: 2024-05-16

## 2024-05-29 LAB
ALBUMIN SERPL ELPH-MCNC: 3.7 G/DL — SIGNIFICANT CHANGE UP (ref 3.5–5.2)
ALP SERPL-CCNC: 72 U/L — SIGNIFICANT CHANGE UP (ref 30–115)
ALT FLD-CCNC: 58 U/L — HIGH (ref 0–41)
ANION GAP SERPL CALC-SCNC: 12 MMOL/L — SIGNIFICANT CHANGE UP (ref 7–14)
AST SERPL-CCNC: 33 U/L — SIGNIFICANT CHANGE UP (ref 0–41)
BASOPHILS # BLD AUTO: 0.03 K/UL — SIGNIFICANT CHANGE UP (ref 0–0.2)
BASOPHILS NFR BLD AUTO: 0.3 % — SIGNIFICANT CHANGE UP (ref 0–1)
BILIRUB SERPL-MCNC: 0.6 MG/DL — SIGNIFICANT CHANGE UP (ref 0.2–1.2)
BUN SERPL-MCNC: 22 MG/DL — HIGH (ref 10–20)
CALCIUM SERPL-MCNC: 9.2 MG/DL — SIGNIFICANT CHANGE UP (ref 8.4–10.5)
CHLORIDE SERPL-SCNC: 102 MMOL/L — SIGNIFICANT CHANGE UP (ref 98–110)
CO2 SERPL-SCNC: 23 MMOL/L — SIGNIFICANT CHANGE UP (ref 17–32)
CREAT SERPL-MCNC: 1.1 MG/DL — SIGNIFICANT CHANGE UP (ref 0.7–1.5)
EGFR: 75 ML/MIN/1.73M2 — SIGNIFICANT CHANGE UP
EOSINOPHIL # BLD AUTO: 0.25 K/UL — SIGNIFICANT CHANGE UP (ref 0–0.7)
EOSINOPHIL NFR BLD AUTO: 2.2 % — SIGNIFICANT CHANGE UP (ref 0–8)
GLUCOSE SERPL-MCNC: 114 MG/DL — HIGH (ref 70–99)
HCT VFR BLD CALC: 36.5 % — LOW (ref 42–52)
HGB BLD-MCNC: 12.3 G/DL — LOW (ref 14–18)
IMM GRANULOCYTES NFR BLD AUTO: 1.4 % — HIGH (ref 0.1–0.3)
LYMPHOCYTES # BLD AUTO: 2.4 K/UL — SIGNIFICANT CHANGE UP (ref 1.2–3.4)
LYMPHOCYTES # BLD AUTO: 21.1 % — SIGNIFICANT CHANGE UP (ref 20.5–51.1)
MAGNESIUM SERPL-MCNC: 2.5 MG/DL — HIGH (ref 1.8–2.4)
MCHC RBC-ENTMCNC: 28.6 PG — SIGNIFICANT CHANGE UP (ref 27–31)
MCHC RBC-ENTMCNC: 33.7 G/DL — SIGNIFICANT CHANGE UP (ref 32–37)
MCV RBC AUTO: 84.9 FL — SIGNIFICANT CHANGE UP (ref 80–94)
MONOCYTES # BLD AUTO: 0.83 K/UL — HIGH (ref 0.1–0.6)
MONOCYTES NFR BLD AUTO: 7.3 % — SIGNIFICANT CHANGE UP (ref 1.7–9.3)
NEUTROPHILS # BLD AUTO: 7.68 K/UL — HIGH (ref 1.4–6.5)
NEUTROPHILS NFR BLD AUTO: 67.7 % — SIGNIFICANT CHANGE UP (ref 42.2–75.2)
NRBC # BLD: 0 /100 WBCS — SIGNIFICANT CHANGE UP (ref 0–0)
PLATELET # BLD AUTO: 284 K/UL — SIGNIFICANT CHANGE UP (ref 130–400)
PMV BLD: 9.3 FL — SIGNIFICANT CHANGE UP (ref 7.4–10.4)
POTASSIUM SERPL-MCNC: 4.6 MMOL/L — SIGNIFICANT CHANGE UP (ref 3.5–5)
POTASSIUM SERPL-SCNC: 4.6 MMOL/L — SIGNIFICANT CHANGE UP (ref 3.5–5)
PROT SERPL-MCNC: 5.9 G/DL — LOW (ref 6–8)
RBC # BLD: 4.3 M/UL — LOW (ref 4.7–6.1)
RBC # FLD: 13.4 % — SIGNIFICANT CHANGE UP (ref 11.5–14.5)
SODIUM SERPL-SCNC: 137 MMOL/L — SIGNIFICANT CHANGE UP (ref 135–146)
WBC # BLD: 11.35 K/UL — HIGH (ref 4.8–10.8)
WBC # FLD AUTO: 11.35 K/UL — HIGH (ref 4.8–10.8)

## 2024-05-29 PROCEDURE — 93010 ELECTROCARDIOGRAM REPORT: CPT

## 2024-05-29 PROCEDURE — 71045 X-RAY EXAM CHEST 1 VIEW: CPT | Mod: 26

## 2024-05-29 RX ORDER — ACETAMINOPHEN 500 MG
2 TABLET ORAL
Qty: 0 | Refills: 0 | DISCHARGE
Start: 2024-05-29

## 2024-05-29 RX ORDER — POTASSIUM CHLORIDE 20 MEQ
1 PACKET (EA) ORAL
Qty: 7 | Refills: 0
Start: 2024-05-29 | End: 2024-06-04

## 2024-05-29 RX ORDER — METOPROLOL TARTRATE 50 MG
0.5 TABLET ORAL
Qty: 45 | Refills: 0
Start: 2024-05-29 | End: 2024-06-27

## 2024-05-29 RX ORDER — ASPIRIN/CALCIUM CARB/MAGNESIUM 324 MG
1 TABLET ORAL
Qty: 30 | Refills: 0
Start: 2024-05-29 | End: 2024-06-27

## 2024-05-29 RX ORDER — LOSARTAN POTASSIUM 100 MG/1
1 TABLET, FILM COATED ORAL
Refills: 0 | DISCHARGE

## 2024-05-29 RX ORDER — ASPIRIN/CALCIUM CARB/MAGNESIUM 324 MG
1 TABLET ORAL
Refills: 0 | DISCHARGE

## 2024-05-29 RX ORDER — ACETAMINOPHEN 500 MG
650 TABLET ORAL EVERY 6 HOURS
Refills: 0 | Status: DISCONTINUED | OUTPATIENT
Start: 2024-05-29 | End: 2024-05-29

## 2024-05-29 RX ORDER — FUROSEMIDE 40 MG
1 TABLET ORAL
Qty: 7 | Refills: 0
Start: 2024-05-29 | End: 2024-06-04

## 2024-05-29 RX ORDER — METOPROLOL TARTRATE 100 MG/1
1 TABLET, FILM COATED ORAL
Qty: 45 | Refills: 0 | DISCHARGE
Start: 2024-05-29 | End: 2024-06-27

## 2024-05-29 RX ORDER — METOPROLOL TARTRATE 50 MG
1 TABLET ORAL
Refills: 0 | DISCHARGE

## 2024-05-29 RX ORDER — CLOPIDOGREL BISULFATE 75 MG/1
1 TABLET, FILM COATED ORAL
Qty: 30 | Refills: 0
Start: 2024-05-29 | End: 2024-06-27

## 2024-05-29 RX ADMIN — CHLORHEXIDINE GLUCONATE 1 APPLICATION(S): 213 SOLUTION TOPICAL at 06:16

## 2024-05-29 RX ADMIN — CLOPIDOGREL BISULFATE 75 MILLIGRAM(S): 75 TABLET, FILM COATED ORAL at 11:26

## 2024-05-29 RX ADMIN — Medication 20 MILLIGRAM(S): at 06:16

## 2024-05-29 RX ADMIN — Medication 12.5 MILLIGRAM(S): at 06:17

## 2024-05-29 RX ADMIN — FAMOTIDINE 20 MILLIGRAM(S): 10 INJECTION INTRAVENOUS at 06:16

## 2024-05-29 RX ADMIN — Medication 20 MILLIEQUIVALENT(S): at 11:26

## 2024-05-29 NOTE — DISCHARGE NOTE NURSING/CASE MANAGEMENT/SOCIAL WORK - PATIENT PORTAL LINK FT
You can access the FollowMyHealth Patient Portal offered by [facility] by registering at the following website: [email or web address]. By joining [facility] FollowMyHealth portal, you will also be able to view your health information using other applications (apps) compatible with our system.

## 2024-05-29 NOTE — DISCHARGE NOTE NURSING/CASE MANAGEMENT/SOCIAL WORK - NSDCPEFALRISK_GEN_ALL_CORE
For information on Fall & Injury Prevention, visit: [email or web address] OR  [email or web address] OR  https://www.cdc.gov/steadi/patient.html

## 2024-05-29 NOTE — PROGRESS NOTE ADULT - PROVIDER SPECIALTY LIST ADULT
CT Surgery
Critical Care
CT Surgery
Critical Care

## 2024-05-29 NOTE — PROGRESS NOTE ADULT - SUBJECTIVE AND OBJECTIVE BOX
OPERATIVE PROCEDURE(s): CABG x4               POD #8                       SURGEON(s): MIGUEL Kumar      SUBJECTIVE ASSESSMENT:63yMale patient seen and examined at bedside. No complaints at this time.     Vital Signs Last 24 Hrs  T(F): 98.7 (29 May 2024 04:00), Max: 99.2 (28 May 2024 16:00)  HR: 81 (29 May 2024 06:00) (72 - 90)  BP: 118/79 (29 May 2024 07:00) (95/57 - 127/71)  BP(mean): 93 (29 May 2024 07:00) (71 - 94)  RR: 22 (29 May 2024 07:00) (12 - 31)  SpO2: 97% (29 May 2024 07:00) (95% - 99%)      I&O's Detail    28 May 2024 07:01  -  29 May 2024 07:00  --------------------------------------------------------  IN:    Oral Fluid: 960 mL  Total IN: 960 mL    OUT:    Voided (mL): 850 mL  Total OUT: 850 mL        Net: I&O's Detail    27 May 2024 07:01  -  28 May 2024 07:00  --------------------------------------------------------  Total NET: 670 mL      28 May 2024 07:01  -  29 May 2024 07:00  --------------------------------------------------------  Total NET: 110 mL        CAPILLARY BLOOD GLUCOSE        A1C with Estimated Average Glucose Result: 6.0 % (05-18-24 @ 08:08)      Physical Exam:  General: NAD; A&Ox3  Neuro: pupils equal and reactive, speech clear, no overt sensory or motor deficts  Cardiac: S1/S2, RRR, no murmur, no rubs  Lungs: unlabored respirations, CTA b/l, no wheeze, no rales, no crackles  Abdomen: Soft/NT/ND; positive bowel sounds x 4  Sternum: Intact, no click, incision healing well with no drainage  Incisions: Incisions clean/dry/intact  Extremities: No edema b/l lower extremities; good capillary refill; no cyanosis; palpable 1+ pedal pulses b/l      BOWEL MOVEMENT:  [] YES [] NO, If No, Timing since last BM Day #        LABS:                        12.3<L>  11.35<H> )-----------( 284      ( 29 May 2024 04:05 )             36.5<L>                        12.7<L>  10.92<H> )-----------( 241      ( 28 May 2024 01:49 )             37.9<L>    05-29    137  |  102  |  22<H>  ----------------------------<  114<H>  4.6   |  23  |  1.1  05-28    136  |  101  |  26<H>  ----------------------------<  151<H>  4.1   |  22  |  1.2    Ca    9.2      29 May 2024 04:05  Mg     2.5     05-29    TPro  5.9<L> [6.0 - 8.0]  /  Alb  3.7 [3.5 - 5.2]  /  TBili  0.6 [0.2 - 1.2]  /  DBili  x   /  AST  33 [0 - 41]  /  ALT  58<H> [0 - 41]  /  AlkPhos  72 [30 - 115]  05-29      Urinalysis Basic - ( 29 May 2024 04:05 )    Color: x / Appearance: x / SG: x / pH: x  Gluc: 114 mg/dL / Ketone: x  / Bili: x / Urobili: x   Blood: x / Protein: x / Nitrite: x   Leuk Esterase: x / RBC: x / WBC x   Sq Epi: x / Non Sq Epi: x / Bacteria: x        RADIOLOGY & ADDITIONAL TESTS:  CXR:   < from: Xray Chest 2 Views PA/Lat (05.28.24 @ 07:34) >    INTERPRETATION:  CLINICAL HISTORY: Shortness of Breath.    COMPARISON: Chest radiograph from earlier the same day.    TECHNIQUE: Frontal and lateral chest radiographs. Adequate positioning.    FINDINGS:    Support devices: None.    Cardiac/mediastinum/hilum: Stable.    Lung parenchyma/Pleura: Left basilar opacity/atelectasis.    Skeleton/soft tissues: Stable.      IMPRESSION:    Left basilar opacity/atelectasis.        EKG:  < from: 12 Lead ECG (05.28.24 @ 08:10) >    Ventricular Rate 79 BPM    Atrial Rate 79 BPM    P-R Interval 122 ms    QRS Duration 98 ms    Q-T Interval 368 ms    QTC Calculation(Bazett) 421 ms    P Axis -25 degrees    R Axis 72 degrees    T Axis 162 degrees    Diagnosis Line Normal sinus rhythm  Inferior infarct , age undetermined  T wave abnormality, consider anterolateral ischemia  Abnormal ECG          Allergies    No Known Allergies    Intolerances      MEDICATIONS  (STANDING):  aspirin enteric coated 81 milliGRAM(s) Oral daily  atorvastatin 40 milliGRAM(s) Oral at bedtime  chlorhexidine 2% Cloths 1 Application(s) Topical daily  clopidogrel Tablet 75 milliGRAM(s) Oral daily  dextrose 10% Bolus 125 milliLiter(s) IV Bolus once  dextrose 5%. 1000 milliLiter(s) (100 mL/Hr) IV Continuous <Continuous>  dextrose 5%. 1000 milliLiter(s) (50 mL/Hr) IV Continuous <Continuous>  famotidine    Tablet 20 milliGRAM(s) Oral two times a day  furosemide   Injectable 20 milliGRAM(s) IV Push daily  glucagon  Injectable 1 milliGRAM(s) IntraMuscular once  heparin   Injectable 3000 Unit(s) SubCutaneous every 12 hours  metoprolol tartrate 12.5 milliGRAM(s) Oral every 8 hours  potassium chloride    Tablet ER 20 milliEquivalent(s) Oral daily    MEDICATIONS  (PRN):  ondansetron Injectable 4 milliGRAM(s) IV Push every 4 hours PRN Nausea and/or Vomiting    Home Medications:  Aspir 81 oral delayed release tablet: 1 tab(s) orally once a day (17 May 2024 14:10)  Lipitor 20 mg oral tablet: 1 tab(s) orally once a day (17 May 2024 14:10)  losartan 50 mg oral tablet: 1 tab(s) orally once a day (17 May 2024 14:10)  Metoprolol Tartrate 25 mg oral tablet: 1 tab(s) orally 2 times a day (17 May 2024 14:10)      Pharmacologic DVT Prophylaxis [] YES, [] NO: Contraindication:  SCD's: YES b/l    GI Prophylaxis: pepcid 20mg    Post-Op Beta-Blockers: [X] Yes, [] No: contraindication:  Post-Op Aspirin:  [X] Yes, [] No: contraindication:  Post-Op Statin:  [X] Yes, [] No: contraindication:    Ambulation/Activity Status: ambulate as tolerated    Assessment/Plan:  63y Male status-post CABG x4 POD #8  - Case and plan discussed with CTU Intensivist and CT Surgeon - Dr. Rodriguez/Tamika/José/Raghav  - Continue CTU supportive care and ongoing plan of care as per continuing CTU rounds.   - Continue DVT/GI prophylaxis  - Incentive Spirometry 10 times an hour  - Continue to advance physical activity as tolerated and continue PT/OT as directed  1. CAD s/p CABG: Continue ASA, plavix statin, BB, pain control as needed.   2. HTN: currently normotensive, cont BB   3. Post-op A. Fib ppx: monitor electrolytes, BB 12.5 TID  4. COPD/Hypoxia: cont nebs, wean o2 as tolerated, encourage incentive spirometry, lasix BID  5. DM/Glucose Control: insulin sliding scale      Social Service Disposition: Rehab denied, patient d/c home with home care today    Social Service Disposition:     OPERATIVE PROCEDURE(s): CABG x4               POD #8                       SURGEON(s): MIGUEL Kumar      SUBJECTIVE ASSESSMENT:63yMale patient seen and examined at bedside. No complaints at this time.     Vital Signs Last 24 Hrs  T(F): 98.7 (29 May 2024 04:00), Max: 99.2 (28 May 2024 16:00)  HR: 81 (29 May 2024 06:00) (72 - 90)  BP: 118/79 (29 May 2024 07:00) (95/57 - 127/71)  BP(mean): 93 (29 May 2024 07:00) (71 - 94)  RR: 22 (29 May 2024 07:00) (12 - 31)  SpO2: 97% (29 May 2024 07:00) (95% - 99%)      I&O's Detail    28 May 2024 07:01  -  29 May 2024 07:00  --------------------------------------------------------  IN:    Oral Fluid: 960 mL  Total IN: 960 mL    OUT:    Voided (mL): 850 mL  Total OUT: 850 mL        Net: I&O's Detail    27 May 2024 07:01  -  28 May 2024 07:00  --------------------------------------------------------  Total NET: 670 mL      28 May 2024 07:01  -  29 May 2024 07:00  --------------------------------------------------------  Total NET: 110 mL        CAPILLARY BLOOD GLUCOSE        A1C with Estimated Average Glucose Result: 6.0 % (05-18-24 @ 08:08)      Physical Exam:  General: NAD; A&Ox3  Neuro: pupils equal and reactive, speech clear, no overt sensory or motor deficts  Cardiac: S1/S2, RRR, no murmur, no rubs  Lungs: unlabored respirations, CTA b/l, no wheeze, no rales, no crackles  Abdomen: Soft/NT/ND; positive bowel sounds x 4  Sternum: Intact, no click, incision healing well with no drainage  Incisions: Incisions clean/dry/intact  Extremities: No edema b/l lower extremities; good capillary refill; no cyanosis; palpable 1+ pedal pulses b/l      BOWEL MOVEMENT:  [] YES [] NO, If No, Timing since last BM Day #        LABS:                        12.3<L>  11.35<H> )-----------( 284      ( 29 May 2024 04:05 )             36.5<L>                        12.7<L>  10.92<H> )-----------( 241      ( 28 May 2024 01:49 )             37.9<L>    05-29    137  |  102  |  22<H>  ----------------------------<  114<H>  4.6   |  23  |  1.1  05-28    136  |  101  |  26<H>  ----------------------------<  151<H>  4.1   |  22  |  1.2    Ca    9.2      29 May 2024 04:05  Mg     2.5     05-29    TPro  5.9<L> [6.0 - 8.0]  /  Alb  3.7 [3.5 - 5.2]  /  TBili  0.6 [0.2 - 1.2]  /  DBili  x   /  AST  33 [0 - 41]  /  ALT  58<H> [0 - 41]  /  AlkPhos  72 [30 - 115]  05-29      Urinalysis Basic - ( 29 May 2024 04:05 )    Color: x / Appearance: x / SG: x / pH: x  Gluc: 114 mg/dL / Ketone: x  / Bili: x / Urobili: x   Blood: x / Protein: x / Nitrite: x   Leuk Esterase: x / RBC: x / WBC x   Sq Epi: x / Non Sq Epi: x / Bacteria: x        RADIOLOGY & ADDITIONAL TESTS:  CXR:   < from: Xray Chest 2 Views PA/Lat (05.28.24 @ 07:34) >    INTERPRETATION:  CLINICAL HISTORY: Shortness of Breath.    COMPARISON: Chest radiograph from earlier the same day.    TECHNIQUE: Frontal and lateral chest radiographs. Adequate positioning.    FINDINGS:    Support devices: None.    Cardiac/mediastinum/hilum: Stable.    Lung parenchyma/Pleura: Left basilar opacity/atelectasis.    Skeleton/soft tissues: Stable.      IMPRESSION:    Left basilar opacity/atelectasis.        EKG:  < from: 12 Lead ECG (05.28.24 @ 08:10) >    Ventricular Rate 79 BPM    Atrial Rate 79 BPM    P-R Interval 122 ms    QRS Duration 98 ms    Q-T Interval 368 ms    QTC Calculation(Bazett) 421 ms    P Axis -25 degrees    R Axis 72 degrees    T Axis 162 degrees    Diagnosis Line Normal sinus rhythm  Inferior infarct , age undetermined  T wave abnormality, consider anterolateral ischemia  Abnormal ECG          Allergies    No Known Allergies    Intolerances      MEDICATIONS  (STANDING):  aspirin enteric coated 81 milliGRAM(s) Oral daily  atorvastatin 40 milliGRAM(s) Oral at bedtime  chlorhexidine 2% Cloths 1 Application(s) Topical daily  clopidogrel Tablet 75 milliGRAM(s) Oral daily  famotidine    Tablet 20 milliGRAM(s) Oral two times a day  furosemide   Injectable 20 milliGRAM(s) IV Push daily  glucagon  Injectable 1 milliGRAM(s) IntraMuscular once  heparin   Injectable 3000 Unit(s) SubCutaneous every 12 hours  metoprolol tartrate 12.5 milliGRAM(s) Oral every 8 hours  potassium chloride    Tablet ER 20 milliEquivalent(s) Oral daily    MEDICATIONS  (PRN):  ondansetron Injectable 4 milliGRAM(s) IV Push every 4 hours PRN Nausea and/or Vomiting    Home Medications:  Aspir 81 oral delayed release tablet: 1 tab(s) orally once a day (17 May 2024 14:10)  Lipitor 20 mg oral tablet: 1 tab(s) orally once a day (17 May 2024 14:10)  losartan 50 mg oral tablet: 1 tab(s) orally once a day (17 May 2024 14:10)  Metoprolol Tartrate 25 mg oral tablet: 1 tab(s) orally 2 times a day (17 May 2024 14:10)      Pharmacologic DVT Prophylaxis [] YES, [] NO: Contraindication:  SCD's: YES b/l    GI Prophylaxis: pepcid 20mg    Post-Op Beta-Blockers: [X] Yes, [] No: contraindication:  Post-Op Aspirin:  [X] Yes, [] No: contraindication:  Post-Op Statin:  [X] Yes, [] No: contraindication:    Ambulation/Activity Status: ambulate as tolerated    Assessment/Plan:  63y Male status-post CABG x4 POD #8  - Case and plan discussed with CTU Intensivist and CT Surgeon - Dr. Rodriguez/Tamika/José/Raghav  - Continue CTU supportive care and ongoing plan of care as per continuing CTU rounds.   - Continue DVT/GI prophylaxis  - Incentive Spirometry 10 times an hour  - Continue to advance physical activity as tolerated and continue PT/OT as directed  1. CAD s/p CABG: Continue ASA, plavix statin, BB, pain control as needed.   2. HTN: currently normotensive, cont BB   3. Post-op A. Fib ppx: monitor electrolytes, BB 12.5 TID  4. COPD/Hypoxia: cont nebs, wean o2 as tolerated, encourage incentive spirometry, lasix BID  5. DM/Glucose Control: insulin sliding scale      Social Service Disposition: Rehab denied, patient d/c home with home care today     OPERATIVE PROCEDURE(s): CABG x4               POD #8                       SURGEON(s): MIGUEL Kumar      SUBJECTIVE ASSESSMENT:63yMale patient seen and examined at bedside. No complaints at this time.     Vital Signs Last 24 Hrs  T(F): 98.7 (29 May 2024 04:00), Max: 99.2 (28 May 2024 16:00)  HR: 81 (29 May 2024 06:00) (72 - 90)  BP: 118/79 (29 May 2024 07:00) (95/57 - 127/71)  BP(mean): 93 (29 May 2024 07:00) (71 - 94)  RR: 22 (29 May 2024 07:00) (12 - 31)  SpO2: 97% (29 May 2024 07:00) (95% - 99%)      I&O's Detail    28 May 2024 07:01  -  29 May 2024 07:00  --------------------------------------------------------  IN:    Oral Fluid: 960 mL  Total IN: 960 mL    OUT:    Voided (mL): 850 mL  Total OUT: 850 mL        Net: I&O's Detail    27 May 2024 07:01  -  28 May 2024 07:00  --------------------------------------------------------  Total NET: 670 mL      28 May 2024 07:01  -  29 May 2024 07:00  --------------------------------------------------------  Total NET: 110 mL        CAPILLARY BLOOD GLUCOSE        A1C with Estimated Average Glucose Result: 6.0 % (05-18-24 @ 08:08)      Physical Exam:  General: NAD; A&Ox3  Neuro: pupils equal and reactive, speech clear, no overt sensory or motor deficts  Cardiac: S1/S2, RRR, no murmur, no rubs  Lungs: unlabored respirations, CTA b/l, no wheeze, no rales, no crackles  Abdomen: Soft/NT/ND; positive bowel sounds x 4  Sternum: Intact, no click, incision healing well with no drainage  Incisions: Incisions clean/dry/intact  Extremities: No edema b/l lower extremities; good capillary refill; no cyanosis; palpable 1+ pedal pulses b/l      BOWEL MOVEMENT:  [] YES [] NO, If No, Timing since last BM Day #        LABS:                        12.3<L>  11.35<H> )-----------( 284      ( 29 May 2024 04:05 )             36.5<L>                        12.7<L>  10.92<H> )-----------( 241      ( 28 May 2024 01:49 )             37.9<L>    05-29    137  |  102  |  22<H>  ----------------------------<  114<H>  4.6   |  23  |  1.1  05-28    136  |  101  |  26<H>  ----------------------------<  151<H>  4.1   |  22  |  1.2    Ca    9.2      29 May 2024 04:05  Mg     2.5     05-29    TPro  5.9<L> [6.0 - 8.0]  /  Alb  3.7 [3.5 - 5.2]  /  TBili  0.6 [0.2 - 1.2]  /  DBili  x   /  AST  33 [0 - 41]  /  ALT  58<H> [0 - 41]  /  AlkPhos  72 [30 - 115]  05-29      Urinalysis Basic - ( 29 May 2024 04:05 )    Color: x / Appearance: x / SG: x / pH: x  Gluc: 114 mg/dL / Ketone: x  / Bili: x / Urobili: x   Blood: x / Protein: x / Nitrite: x   Leuk Esterase: x / RBC: x / WBC x   Sq Epi: x / Non Sq Epi: x / Bacteria: x        RADIOLOGY & ADDITIONAL TESTS:  CXR:   < from: Xray Chest 2 Views PA/Lat (05.28.24 @ 07:34) >    INTERPRETATION:  CLINICAL HISTORY: Shortness of Breath.    COMPARISON: Chest radiograph from earlier the same day.    TECHNIQUE: Frontal and lateral chest radiographs. Adequate positioning.    FINDINGS:    Support devices: None.    Cardiac/mediastinum/hilum: Stable.    Lung parenchyma/Pleura: Left basilar opacity/atelectasis.    Skeleton/soft tissues: Stable.      IMPRESSION:    Left basilar opacity/atelectasis.        EKG:  < from: 12 Lead ECG (05.28.24 @ 08:10) >    Ventricular Rate 79 BPM    Atrial Rate 79 BPM    P-R Interval 122 ms    QRS Duration 98 ms    Q-T Interval 368 ms    QTC Calculation(Bazett) 421 ms    P Axis -25 degrees    R Axis 72 degrees    T Axis 162 degrees    Diagnosis Line Normal sinus rhythm  Inferior infarct , age undetermined  T wave abnormality, consider anterolateral ischemia  Abnormal ECG          Allergies    No Known Allergies    Intolerances      MEDICATIONS  (STANDING):  aspirin enteric coated 81 milliGRAM(s) Oral daily  atorvastatin 40 milliGRAM(s) Oral at bedtime  chlorhexidine 2% Cloths 1 Application(s) Topical daily  clopidogrel Tablet 75 milliGRAM(s) Oral daily  famotidine    Tablet 20 milliGRAM(s) Oral two times a day  furosemide   Injectable 20 milliGRAM(s) IV Push daily  glucagon  Injectable 1 milliGRAM(s) IntraMuscular once  heparin   Injectable 3000 Unit(s) SubCutaneous every 12 hours  metoprolol tartrate 12.5 milliGRAM(s) Oral every 8 hours  potassium chloride    Tablet ER 20 milliEquivalent(s) Oral daily    MEDICATIONS  (PRN):  ondansetron Injectable 4 milliGRAM(s) IV Push every 4 hours PRN Nausea and/or Vomiting    Home Medications:  Aspir 81 oral delayed release tablet: 1 tab(s) orally once a day (17 May 2024 14:10)  Lipitor 20 mg oral tablet: 1 tab(s) orally once a day (17 May 2024 14:10)  losartan 50 mg oral tablet: 1 tab(s) orally once a day (17 May 2024 14:10)  Metoprolol Tartrate 25 mg oral tablet: 1 tab(s) orally 2 times a day (17 May 2024 14:10)      Pharmacologic DVT Prophylaxis [] YES, [] NO: Contraindication:  SCD's: YES b/l    GI Prophylaxis: pepcid 20mg    Post-Op Beta-Blockers: [X] Yes, [] No: contraindication:  Post-Op Aspirin:  [X] Yes, [] No: contraindication:  Post-Op Statin:  [X] Yes, [] No: contraindication:    Ambulation/Activity Status: ambulate as tolerated    Assessment/Plan:  63y Male status-post CABG x4 POD #8  - Case and plan discussed with CTU Intensivist and CT Surgeon - Dr. Rodriguez/Tamika/José/Raghav  - Continue CTU supportive care and ongoing plan of care as per continuing CTU rounds.   - Continue DVT/GI prophylaxis  - Incentive Spirometry 10 times an hour  - Continue to advance physical activity as tolerated and continue PT/OT as directed  1. CAD s/p CABG: Continue ASA, plavix statin, BB, pain control as needed.   2. HTN: currently normotensive, cont BB   3. Post-op A. Fib ppx: monitor electrolytes, BB 12.5 TID  4. COPD/Hypoxia: cont nebs, wean o2 as tolerated, encourage incentive spirometry, lasix daily  5. DM/Glucose Control: insulin sliding scale      Social Service Disposition: Rehab denied, patient d/c home with home care today

## 2024-05-30 ENCOUNTER — APPOINTMENT (OUTPATIENT)
Dept: CARE COORDINATION | Facility: HOME HEALTH | Age: 64
End: 2024-05-30
Payer: MEDICAID

## 2024-05-30 VITALS
OXYGEN SATURATION: 98 % | SYSTOLIC BLOOD PRESSURE: 110 MMHG | DIASTOLIC BLOOD PRESSURE: 68 MMHG | RESPIRATION RATE: 12 BRPM | HEART RATE: 88 BPM

## 2024-05-30 PROCEDURE — 99024 POSTOP FOLLOW-UP VISIT: CPT

## 2024-05-30 RX ORDER — ERGOCALCIFEROL 1.25 MG/1
1.25 MG CAPSULE ORAL
Refills: 0 | Status: DISCONTINUED | COMMUNITY
End: 2024-05-30

## 2024-05-30 RX ORDER — LOSARTAN POTASSIUM 50 MG/1
50 TABLET, FILM COATED ORAL DAILY
Refills: 0 | Status: DISCONTINUED | COMMUNITY
End: 2024-05-30

## 2024-05-30 NOTE — PHYSICAL EXAM
[Neck Appearance] : the appearance of the neck was normal [Neck Cervical Mass (___cm)] : no neck mass was observed [Jugular Venous Distention Increased] : there was no jugular-venous distention [Auscultation Breath Sounds / Voice Sounds] : lungs were clear to auscultation bilaterally [Heart Rate And Rhythm] : heart rate was normal and rhythm regular [Heart Sounds] : normal S1 and S2 [Murmurs] : no murmurs [Heart Sounds Gallop] : no gallops [Heart Sounds Pericardial Friction Rub] : no pericardial rub [Chest Visual Inspection Thoracic Asymmetry] : no chest asymmetry [Diminished Respiratory Excursion] : normal chest expansion [FreeTextEntry1] : MSI and CT sites without erythema, drainage or warmth, with edges well approximated. Sternum stable    [2+] : left 2+ [FreeTextEntry2] : SVG harvest site without erythema, warmth or drainage. Resolving soft, NT ecchymosis to thigh area.     [Abnormal Walk] : normal gait [Nail Clubbing] : no clubbing  or cyanosis of the fingernails [Musculoskeletal - Swelling] : no joint swelling seen [Motor Tone] : muscle strength and tone were normal [Skin Color & Pigmentation] : normal skin color and pigmentation [Skin Turgor] : normal skin turgor [] : no rash [Deep Tendon Reflexes (DTR)] : deep tendon reflexes were 2+ and symmetric [Sensation] : the sensory exam was normal to light touch and pinprick [No Focal Deficits] : no focal deficits [Oriented To Time, Place, And Person] : oriented to person, place, and time [Impaired Insight] : insight and judgment were intact [Affect] : the affect was normal

## 2024-05-30 NOTE — HISTORY OF PRESENT ILLNESS
[FreeTextEntry1] : amie Course: Discharge Date 29-May-2024 Admission Date 17-May-2024 13:51 Reason for Admission Coronary artery Disease Hospital Course 63y Male with PMH of HTN, HLD, ? CKD (Cr 1.5 on 5/16/24), presented to his primary care MD for routine check up and found to have an abnormal ECG. He was referred to Dr. Carrillo for cardiac work. Stress ECHO revealed apical, inferior, inferoseptal wall ischemia, and EF 40% with exercise, 66% @ rest. He only exercised for 3 mins and 40 secs, and the test was stopped due to SOB. Subsequent cardiac catheterization this admission revealed advanced multi-vessel CAD, He denies any chest pain, dizziness, n/v, diaphoresis, orthopnea, PND, LE edema, palpitations, syncope. He was admitted post catheterization for medical optimization prior to CABG. On 5/21/24, he underwent surgical myocardial revascularization. Post-operatively, patient had an uneventful hospital course. He was discharged home in stable condition on POD#8 with instructions to follow up with Dr. Kumar on 6/3/2024 @ 3:45pm ____________________________________________________________________ Today patient was seen for IHA s/p discharge from  [facility] . Patient informed they are being followed by [facility] program. Time was spent with the patient reviewing the discharge material including medications, follow up appointments, recovery, concerning symptoms, and how to contact me should they have questions.

## 2024-05-30 NOTE — ASSESSMENT
[FreeTextEntry1] : Pt recovering well at home s/p cabg. Patients resides alone, has supportive friends and neighbors. He is very involved in his care. We reviewed all medications and dosages with pt understanding. Pt has all medications in home and is taking as prescribed.   Patient has a scale for daily weights. Patient is wearing compression stockings. Following DASH diet. Instructed on use of incentive spirometer.   Denies pain at this time. No new symptoms, issues or concerns to report at this time.   Appt schedule reviewed with pt verbalizing understanding.

## 2024-05-30 NOTE — PLAN
[TextEntry] : Pt advised of importance of daily weights. Pt instructed on how to use incentive spirometer every hour, demonstrated proper use. Pt encouraged to ambulate as much as tolerated, avoiding extreme temperatures outdoors. Also advised to cleanse incisions daily with mild soap and water and to avoid lotions, powders, ointments or creams near or on the incision. Low salt, low fat diet encouraged and discussed. Pt advised to avoid heavy lifting or straining.     [facility] team will continue to follow up with pt status.  NP/CCC roles explained with pt understanding, contact information provided. Pt agrees to call with any questions, issues or concerns.  Worsening symptoms reviewed with patient understanding.      FOLLOW UP APPOINTMENTS:  CTS:1 week   CARDIOLOGIST:2 weeks   PCP: Pt encouraged to follow up within one month of discharge

## 2024-06-03 ENCOUNTER — APPOINTMENT (OUTPATIENT)
Dept: CARDIOTHORACIC SURGERY | Facility: CLINIC | Age: 64
End: 2024-06-03
Payer: MEDICAID

## 2024-06-03 VITALS
HEART RATE: 84 BPM | HEIGHT: 68 IN | BODY MASS INDEX: 28.79 KG/M2 | SYSTOLIC BLOOD PRESSURE: 115 MMHG | OXYGEN SATURATION: 98 % | TEMPERATURE: 97 F | DIASTOLIC BLOOD PRESSURE: 78 MMHG | RESPIRATION RATE: 11 BRPM | WEIGHT: 190 LBS

## 2024-06-03 PROCEDURE — 99024 POSTOP FOLLOW-UP VISIT: CPT

## 2024-06-04 DIAGNOSIS — Z91.148 PATIENT'S OTHER NONCOMPLIANCE WITH MEDICATION REGIMEN FOR OTHER REASON: ICD-10-CM

## 2024-06-04 DIAGNOSIS — R09.02 HYPOXEMIA: ICD-10-CM

## 2024-06-04 DIAGNOSIS — R73.03 PREDIABETES: ICD-10-CM

## 2024-06-04 DIAGNOSIS — D62 ACUTE POSTHEMORRHAGIC ANEMIA: ICD-10-CM

## 2024-06-04 DIAGNOSIS — D69.6 THROMBOCYTOPENIA, UNSPECIFIED: ICD-10-CM

## 2024-06-04 DIAGNOSIS — G47.00 INSOMNIA, UNSPECIFIED: ICD-10-CM

## 2024-06-04 DIAGNOSIS — N18.9 CHRONIC KIDNEY DISEASE, UNSPECIFIED: ICD-10-CM

## 2024-06-04 DIAGNOSIS — R74.01 ELEVATION OF LEVELS OF LIVER TRANSAMINASE LEVELS: ICD-10-CM

## 2024-06-04 DIAGNOSIS — E83.42 HYPOMAGNESEMIA: ICD-10-CM

## 2024-06-04 DIAGNOSIS — I42.9 CARDIOMYOPATHY, UNSPECIFIED: ICD-10-CM

## 2024-06-04 DIAGNOSIS — K08.89 OTHER SPECIFIED DISORDERS OF TEETH AND SUPPORTING STRUCTURES: ICD-10-CM

## 2024-06-04 DIAGNOSIS — Z79.82 LONG TERM (CURRENT) USE OF ASPIRIN: ICD-10-CM

## 2024-06-04 DIAGNOSIS — E78.5 HYPERLIPIDEMIA, UNSPECIFIED: ICD-10-CM

## 2024-06-04 DIAGNOSIS — I25.82 CHRONIC TOTAL OCCLUSION OF CORONARY ARTERY: ICD-10-CM

## 2024-06-04 DIAGNOSIS — I25.110 ATHEROSCLEROTIC HEART DISEASE OF NATIVE CORONARY ARTERY WITH UNSTABLE ANGINA PECTORIS: ICD-10-CM

## 2024-06-04 DIAGNOSIS — I12.9 HYPERTENSIVE CHRONIC KIDNEY DISEASE WITH STAGE 1 THROUGH STAGE 4 CHRONIC KIDNEY DISEASE, OR UNSPECIFIED CHRONIC KIDNEY DISEASE: ICD-10-CM

## 2024-06-04 DIAGNOSIS — Z91.199 PATIENT'S NONCOMPLIANCE WITH OTHER MEDICAL TREATMENT AND REGIMEN DUE TO UNSPECIFIED REASON: ICD-10-CM

## 2024-06-04 DIAGNOSIS — K00.0 ANODONTIA: ICD-10-CM

## 2024-06-04 LAB
ALBUMIN SERPL ELPH-MCNC: 4.2 G/DL
ALP BLD-CCNC: 79 U/L
ALT SERPL-CCNC: 37 U/L
ANION GAP SERPL CALC-SCNC: 15 MMOL/L
AST SERPL-CCNC: 24 U/L
BILIRUB SERPL-MCNC: 0.5 MG/DL
BUN SERPL-MCNC: 19 MG/DL
CALCIUM SERPL-MCNC: 9.3 MG/DL
CHLORIDE SERPL-SCNC: 99 MMOL/L
CO2 SERPL-SCNC: 24 MMOL/L
CREAT SERPL-MCNC: 1.2 MG/DL
EGFR: 68 ML/MIN/1.73M2
GLUCOSE SERPL-MCNC: 99 MG/DL
HCT VFR BLD CALC: 39.1 %
HGB BLD-MCNC: 12.6 G/DL
MCHC RBC-ENTMCNC: 27.5 PG
MCHC RBC-ENTMCNC: 32.2 G/DL
MCV RBC AUTO: 85.4 FL
PLATELET # BLD AUTO: 447 K/UL
PMV BLD AUTO: 0 /100 WBCS
PMV BLD: 9.2 FL
POTASSIUM SERPL-SCNC: 4.5 MMOL/L
PROT SERPL-MCNC: 6.5 G/DL
RBC # BLD: 4.58 M/UL
RBC # FLD: 13.1 %
SODIUM SERPL-SCNC: 138 MMOL/L
WBC # FLD AUTO: 11.76 K/UL

## 2024-06-04 NOTE — COUNSELING
[FreeTextEntry1] : AVIVA HOFFMAN is a 63 year M status post CABG. This is a follow up visit with the patient. During the post op visit all cardiac and sternal precautions were reviewed with the patient. Incision care was reviewed. Education regarding nutrition was also reviewed with the patient, to maintain a low salt diet. On arrival patient denies fever, chills nausea, and vomiting. Denies SOB or palpitation. All questions and concerns were addressed. Patient educated on diet and salt restrictions. Medications were reviewed with the patient, and education for cardiac surgery patient  must continue aspirin, and statin post-operatively until re-evaluated by patients cardiologist. Patient was instructed to follow up with their cardiologist as appropriate for long term management.

## 2024-06-04 NOTE — ASSESSMENT
[FreeTextEntry1] : 63y Male with PMH of HTN, HLD, ? CKD (Cr 1.5 on 5/16/24),  presented to his primary care MD for routine check up and found to have an abnormal ECG. He was referred to Dr. Carrillo for cardiac work.  Stress ECHO revealed apical, inferior, inferoseptal wall ischemia, and EF 40% with exercise.   He only exercised for 3 mins and 40 secs, and the test was stopped due to SOB.  Subsequent cardiac catheterization this admission revealed advanced multi-vessel CAD, He denies any chest pain, dizziness, n/v, diaphoresis, orthopnea, PND, LE edema, palpitations, syncope.  He was admitted post catheterization for medical optimization prior to CABG. On 5/21/24, he underwent surgical myocardial revascularization. Post-operatively, patient had an uneventful hospital course.  Medication Reconciliation Status Admission Reconciliation is Completed  Plan: Doing well overall since surgery Offers no complaints, denies any pain Incisions are healing well, sternal precautions reinforced CBC and CMP F/U with CT surgery in 2 weeks F/U with Dr Carrillo in 2 weeks 6.2 Cm Substernal Thyroid noted on preop CT - will refer to Dr Apple for surgery - once recovered Referred to Pulmonologist for BROOKE workup PMD for routine medical care

## 2024-06-04 NOTE — PHYSICAL EXAM
[] : no respiratory distress [Respiration, Rhythm And Depth] : normal respiratory rhythm and effort [Exaggerated Use Of Accessory Muscles For Inspiration] : no accessory muscle use [Apical Impulse] : the apical impulse was normal [Heart Rate And Rhythm] : heart rate was normal and rhythm regular [Heart Sounds] : normal S1 and S2 [Clean] : clean [Dry] : dry [Healing Well] : healing well [No Edema] : no edema [FreeTextEntry1] : Sternum is stable and healing well [Bleeding] : no active bleeding [Foul Odor] : no foul smell [Purulent Drainage] : no purulent drainage [Serosanguinous Drainage] : no serosanguinous drainage [Erythema] : not erythematous [Warm] : not warm [Tender] : not tender [FreeTextEntry5] : Incisions are healing well and there is no edema in the legs

## 2024-06-04 NOTE — REASON FOR VISIT
[de-identified] : CABG [de-identified] : 5/17/2024 [de-identified] : Here for postop F/U.  The patient is a 63-year-old gentleman who recently underwent urgent coronary artery bypass grafting surgery just a couple of days ago and returns for his first postoperative visit.  He did very well in the postoperative.  And was discharged uneventfully.  Unfortunately the patient lives independently and insistent on going home alone and seems to be doing well fortunately.  He denies any chest pain or shortness of breath and says that he is ambulating well and can go up and down stairs and has no incision issues and denies any fever or any other major complaints.  He tells me that his breathing is improved significantly and he seems to be taking his medications correctly.  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

## 2024-06-05 RX ORDER — CETIRIZINE HYDROCHLORIDE 10 MG/1
10 TABLET, COATED ORAL
Qty: 30 | Refills: 3 | Status: ACTIVE | COMMUNITY
Start: 2024-06-05 | End: 1900-01-01

## 2024-06-10 ENCOUNTER — OUTPATIENT (OUTPATIENT)
Dept: OUTPATIENT SERVICES | Facility: HOSPITAL | Age: 64
LOS: 1 days | End: 2024-06-10
Payer: COMMERCIAL

## 2024-06-10 ENCOUNTER — APPOINTMENT (OUTPATIENT)
Dept: CARDIOTHORACIC SURGERY | Facility: CLINIC | Age: 64
End: 2024-06-10
Payer: MEDICAID

## 2024-06-10 ENCOUNTER — RESULT REVIEW (OUTPATIENT)
Age: 64
End: 2024-06-10

## 2024-06-10 VITALS
SYSTOLIC BLOOD PRESSURE: 121 MMHG | TEMPERATURE: 98.4 F | OXYGEN SATURATION: 98 % | HEART RATE: 84 BPM | DIASTOLIC BLOOD PRESSURE: 82 MMHG | BODY MASS INDEX: 26.07 KG/M2 | HEIGHT: 68 IN | WEIGHT: 172 LBS | RESPIRATION RATE: 12 BRPM

## 2024-06-10 DIAGNOSIS — R06.02 SHORTNESS OF BREATH: ICD-10-CM

## 2024-06-10 PROCEDURE — 71046 X-RAY EXAM CHEST 2 VIEWS: CPT

## 2024-06-10 PROCEDURE — 99024 POSTOP FOLLOW-UP VISIT: CPT

## 2024-06-10 PROCEDURE — 71046 X-RAY EXAM CHEST 2 VIEWS: CPT | Mod: 26

## 2024-06-10 RX ORDER — POTASSIUM CHLORIDE 1500 MG/1
20 TABLET, EXTENDED RELEASE ORAL
Refills: 0 | Status: DISCONTINUED | COMMUNITY
Start: 2024-05-30 | End: 2024-06-10

## 2024-06-10 RX ORDER — FUROSEMIDE 20 MG/1
20 TABLET ORAL
Qty: 7 | Refills: 0 | Status: DISCONTINUED | COMMUNITY
Start: 2024-05-29 | End: 2024-06-10

## 2024-06-11 DIAGNOSIS — R06.02 SHORTNESS OF BREATH: ICD-10-CM

## 2024-06-12 RX ORDER — BLOOD-GLUCOSE METER
KIT MISCELLANEOUS
Qty: 1 | Refills: 0 | Status: ACTIVE | COMMUNITY
Start: 2024-06-12 | End: 1900-01-01

## 2024-06-12 NOTE — PHYSICAL EXAM
[] : no respiratory distress [Auscultation Breath Sounds / Voice Sounds] : lungs were clear to auscultation bilaterally [Heart Rate And Rhythm] : heart rate was normal and rhythm regular [Heart Sounds] : normal S1 and S2 [Murmurs] : no murmurs [Heart Sounds Gallop] : no gallops [Heart Sounds Pericardial Friction Rub] : no pericardial rub [Clean] : clean [Dry] : dry [Healing Well] : healing well [No Edema] : no edema [FreeTextEntry1] : Sternum is stable and healing well [FreeTextEntry5] : B/L LE, no significant edema

## 2024-06-12 NOTE — REASON FOR VISIT
[de-identified] : CABG X 3 [de-identified] : 5/21/2024 [de-identified] : 63-year-old gentleman who comes to the office by himself for his postoperative visit following his urgent coronary artery bypass grafting surgery. He did very well in the postoperative.  And was discharged uneventfully  Unfortunately the patient lives alone and insisted on going home to live alone just a couple of days after his open heart surgery and fortunately is doing very well. He says that he is ambulating well on his own and can go up and down stairs and is walking almost a mile a day already. In fact he took public transport to get here to the office for his appointment and insist that he continues with sternal precautions.  He denies any chest pain or shortness of breath, fevers or any issues with his incisions.

## 2024-06-12 NOTE — DISCUSSION/SUMMARY
[Doing Well] : is doing well [Excellent Pain Control] : has excellent pain control [No Sign of Infection] : is showing no signs of infection [FreeTextEntry1] :  Mr. AVIVA HOFFMAN 63 year status post CABG. This is a follow up visit with the patient. During the post op visit all cardiac and sternal precautions were reviewed with the patient. Incision care was reviewed. Education regarding nutrition was also reviewed with the patient, to maintain a low salt diet. On arrival patient denies fever, chills nausea, and vomiting. Denies SOB or palpitation. All questions and concerns were addressed. Medications were reviewed with the patient, and education for cardiac surgery patient must continue aspirin, and statin post-operatively until re-evaluated by patients cardiologist. Patient was instructed to follow up with their cardiologist as appropriate for long term management.

## 2024-06-12 NOTE — ASSESSMENT
[FreeTextEntry1] : Patient states he feels overall well, denies any SOB, palpitations, fever or chills. Pain well controlled with OTC Tylenol PM, also utilizing as a sleep aid.  Labs reviewed and discussed. Steri strips removed and incisions healing well. -Plan Chest Xray ordered F/U CTS x 2 weeks Continue to take his medications regularly Sternal precautions once again reinforced with the patient He has a follow-up appointment with his cardiologist day after tomorrow  Overall from a cardiac surgery point of view the patient is doing very well. F/U Cardio: Dr. Carrillo ( has an appointment with NP Hilary Dao) F/U PMD: Dr. Khalil for continued medical management F/U ENT: Dr. Apple (appointment 6/14/2024)

## 2024-06-13 ENCOUNTER — APPOINTMENT (OUTPATIENT)
Dept: CARDIOLOGY | Facility: CLINIC | Age: 64
End: 2024-06-13
Payer: MEDICAID

## 2024-06-13 VITALS
DIASTOLIC BLOOD PRESSURE: 70 MMHG | WEIGHT: 179 LBS | HEIGHT: 68 IN | HEART RATE: 77 BPM | SYSTOLIC BLOOD PRESSURE: 114 MMHG | BODY MASS INDEX: 27.13 KG/M2

## 2024-06-13 DIAGNOSIS — R73.03 PREDIABETES.: ICD-10-CM

## 2024-06-13 DIAGNOSIS — I10 ESSENTIAL (PRIMARY) HYPERTENSION: ICD-10-CM

## 2024-06-13 DIAGNOSIS — I25.10 ATHEROSCLEROTIC HEART DISEASE OF NATIVE CORONARY ARTERY W/OUT ANGINA PECTORIS: ICD-10-CM

## 2024-06-13 DIAGNOSIS — Z86.79 PERSONAL HISTORY OF OTHER DISEASES OF THE CIRCULATORY SYSTEM: ICD-10-CM

## 2024-06-13 DIAGNOSIS — E78.5 HYPERLIPIDEMIA, UNSPECIFIED: ICD-10-CM

## 2024-06-13 PROCEDURE — 99214 OFFICE O/P EST MOD 30 MIN: CPT

## 2024-06-13 PROCEDURE — 93000 ELECTROCARDIOGRAM COMPLETE: CPT

## 2024-06-13 RX ORDER — MELATONIN 5 MG
5 CAPSULE ORAL
Qty: 1 | Refills: 0 | Status: DISCONTINUED | COMMUNITY
Start: 2024-06-05 | End: 2024-06-13

## 2024-06-13 RX ORDER — ATORVASTATIN CALCIUM 20 MG/1
20 TABLET, FILM COATED ORAL
Qty: 90 | Refills: 0 | Status: ACTIVE | COMMUNITY
Start: 1900-01-01 | End: 1900-01-01

## 2024-06-13 RX ORDER — CLOPIDOGREL BISULFATE 75 MG/1
75 TABLET, FILM COATED ORAL DAILY
Qty: 90 | Refills: 1 | Status: ACTIVE | COMMUNITY
Start: 2024-05-30 | End: 1900-01-01

## 2024-06-13 RX ORDER — METOPROLOL TARTRATE 25 MG/1
25 TABLET, FILM COATED ORAL
Qty: 180 | Refills: 1 | Status: ACTIVE | COMMUNITY
Start: 2024-05-15 | End: 1900-01-01

## 2024-06-14 ENCOUNTER — APPOINTMENT (OUTPATIENT)
Dept: OTOLARYNGOLOGY | Facility: CLINIC | Age: 64
End: 2024-06-14
Payer: MEDICAID

## 2024-06-14 DIAGNOSIS — E04.9 NONTOXIC GOITER, UNSPECIFIED: ICD-10-CM

## 2024-06-14 DIAGNOSIS — J39.8 OTHER SPECIFIED DISEASES OF UPPER RESPIRATORY TRACT: ICD-10-CM

## 2024-06-14 DIAGNOSIS — E04.1 NONTOXIC SINGLE THYROID NODULE: ICD-10-CM

## 2024-06-14 PROCEDURE — 99204 OFFICE O/P NEW MOD 45 MIN: CPT

## 2024-06-14 NOTE — DATA REVIEWED
[de-identified] : CT chest 5/2024 personally reviewed and interpreted demonstrating substernal goiter with dominant left sided nodule

## 2024-06-14 NOTE — ASSESSMENT
[FreeTextEntry1] : FNA of thyroid to be done in 6 weeks or so TFTs at that time Will discuss transcervical thyridectomy

## 2024-06-14 NOTE — HISTORY OF PRESENT ILLNESS
[de-identified] : Patient presents for initial evaluation for thyroid enlargement. Recently had CABG x3. As per Dr. Kumar notes, "6.2 Cm Substernal Thyroid noted on preop CT - will refer to Dr Apple for surgery - once recovered." Had CXR 6/10/24 that revealed left thyroid enlargement/substernal goiter.

## 2024-06-17 ENCOUNTER — APPOINTMENT (OUTPATIENT)
Dept: CARDIOTHORACIC SURGERY | Facility: CLINIC | Age: 64
End: 2024-06-17
Payer: MEDICAID

## 2024-06-17 VITALS
HEIGHT: 66 IN | HEART RATE: 75 BPM | OXYGEN SATURATION: 98 % | SYSTOLIC BLOOD PRESSURE: 115 MMHG | BODY MASS INDEX: 28.93 KG/M2 | WEIGHT: 180 LBS | TEMPERATURE: 98.5 F | DIASTOLIC BLOOD PRESSURE: 70 MMHG

## 2024-06-17 DIAGNOSIS — Z95.1 PRESENCE OF AORTOCORONARY BYPASS GRAFT: ICD-10-CM

## 2024-06-17 PROCEDURE — 99024 POSTOP FOLLOW-UP VISIT: CPT

## 2024-06-18 NOTE — COUNSELING
[Hygeine (Including Daily Shower)] : hygeine (including daily shower) [Importance of Regular Medical Follow-Up] : the importance of regular medical follow-up [No Heavy Lifting] : no heavy lifting (>15-20 lb. for 1 month or 25 lb. for 3 months from date of surgery) [Blood Pressure Control] : blood pressure control [S/S of infection] : signs and symptoms of infection (and to whom it should be reported) [Progressive Ambulation/Activity] : progressive ambulation/activity [Medication/Vitamin/Herb/Food Interaction] : medication/vitamin/herb/food interaction [FreeTextEntry1] :  Mr. AVIVA HOFFMAN 63 year status post CABG. This is a follow up visit with the patient. During the post op visit all cardiac and sternal precautions were reviewed with the patient. Incision care was reviewed. Education regarding nutrition was also reviewed with the patient, to maintain a low salt diet. On arrival patient denies fever, chills nausea, and vomiting. Denies SOB or palpitation. All questions and concerns were addressed. Medications were reviewed with the patient, and education for cardiac surgery patient must continue aspirin, and statin post-operatively until re-evaluated by patients cardiologist. Patient was instructed to follow up with their cardiologist as appropriate for long term management.

## 2024-06-18 NOTE — REASON FOR VISIT
[de-identified] : 5/29/2024 [de-identified] : CABG X 3, Post op Visit # 3 The patient is a 63-year-old gentleman who returns to the office, by himself, having used public transport and the public bus to get here.  He tells me that he is making excellent progress and has no complaints and has been independent and been living alone since postoperative day 5 when he was discharged. He denies any chest pain or shortness of breath and is ambulating more than a couple of miles a day and can go up and down stairs. He is not meticulous with his body hygiene. He has followed up with his cardiologist and is making excellent progress in recovery.

## 2024-06-18 NOTE — ASSESSMENT
[FreeTextEntry1] : Patient states he feels overall well. Chest Xray images reviewed with patient. Patient offers no complaints. Sternal Incision healing well. Patient is active. Reinforced education regarding sternal precautions and restrictions, patient verbalized understanding.   Patient has a large substernal goiter and he will follow-up with the endocrinologist and the ENT surgeons for the same.  From a cardiac surgery point of view the patient seems to have made an excellent recovery and further follow-up will be with his cardiologist and primary care physician.  I had to spend a significant amount of time with the patient as he had a list of questions and these were all answered and more than 50% of my time was spent answering questions, counseling and coordinating his care.  -Plan #CABG Cont. ASA  Cont Plavix  F/U CTS PRN F/U Cardio Dr. Carrillo (patient had an appointment with NPDORETHA) F/U ENT: Dr. Apple for continued management of Thyroid Goiter F/U PMD: Dr. Khalil for continued medical care

## 2024-06-18 NOTE — PHYSICAL EXAM
[] : no respiratory distress [Auscultation Breath Sounds / Voice Sounds] : lungs were clear to auscultation bilaterally [Heart Rate And Rhythm] : heart rate was normal and rhythm regular [Heart Sounds] : normal S1 and S2 [Heart Sounds Gallop] : no gallops [Murmurs] : no murmurs [Heart Sounds Pericardial Friction Rub] : no pericardial rub [Clean] : clean [Dry] : dry [Healing Well] : healing well [No Edema] : no edema [FreeTextEntry1] : Sternum is stable and is healed completely [FreeTextEntry5] : B/L LE, the incisions are clean and dry and have healed completely

## 2024-06-21 PROBLEM — Z86.79 HISTORY OF CORONARY ARTERY DISEASE: Status: RESOLVED | Noted: 2024-06-21 | Resolved: 2024-06-21

## 2024-06-21 PROBLEM — I10 HTN (HYPERTENSION): Status: ACTIVE | Noted: 2024-05-06

## 2024-06-21 PROBLEM — E78.5 HLD (HYPERLIPIDEMIA): Status: ACTIVE | Noted: 2024-05-06

## 2024-06-21 PROBLEM — I25.10 CORONARY ARTERY DISEASE: Status: ACTIVE | Noted: 2024-06-21

## 2024-06-21 PROBLEM — R73.03 PREDIABETES: Status: ACTIVE | Noted: 2024-06-21

## 2024-06-21 NOTE — ASSESSMENT
[FreeTextEntry1] : 63 YOM CAD s/p CABG last month  HTN controlled HLD better controlled now Pre-DM  Plan: Metoprolol tartrate 25mg BID Continue ASA and Plavix for now Continue Atorvastatin 20mg QHS Repeat blood work ordered Follow up in 3 months   Hilary Dao, DNP, FNP-BC

## 2024-06-21 NOTE — HISTORY OF PRESENT ILLNESS
[FreeTextEntry1] : 63-year-old male with a PMHx of HTN, HLD, pre-DM  Pt presents for a follow up visit. Stress ECHO revealed apical,inferior, inferoseptal wall ischemia, and EF 40% with exercise, 66% @ rest. He only exercised for 3 mins and 40 secs, and the test was stopped due to SOB. Subsequent cardiac catheterization revealed advanced multi-vessel CAD, He was admitted post catheterization and on 5/21/24, he underwent surgical myocardial revascularization. Post-operatively, patient had an uneventful hospital course. He was discharged home in stable condition on POD#8. Pt reports feeling well. Denies chest pain, SOB, or palpitations. He is adherent to meds.   TTE 5/19/24: LVEF 60-65%  5/16/24: LDL73 TG 67

## 2024-06-21 NOTE — REVIEW OF SYSTEMS
[Dyspnea on exertion] : dyspnea during exertion [Chest Discomfort] : no chest discomfort [Lower Ext Edema] : no extremity edema [Palpitations] : no palpitations [Syncope] : no syncope [Negative] : Cardiovascular

## 2024-06-22 ENCOUNTER — TRANSCRIPTION ENCOUNTER (OUTPATIENT)
Age: 64
End: 2024-06-22

## 2024-06-22 RX ORDER — POLYETHYLENE GLYCOL 3350 17 G/17G
17 POWDER, FOR SOLUTION ORAL
Qty: 510 | Refills: 0
Start: 2024-06-22 | End: 2024-07-21

## 2024-06-22 RX ORDER — DOCUSATE SODIUM 100 MG
1 CAPSULE ORAL
Qty: 90 | Refills: 0
Start: 2024-06-22

## 2024-06-27 ENCOUNTER — OUTPATIENT (OUTPATIENT)
Dept: OUTPATIENT SERVICES | Facility: HOSPITAL | Age: 64
LOS: 1 days | End: 2024-06-27
Payer: COMMERCIAL

## 2024-06-27 DIAGNOSIS — K08.109 COMPLETE LOSS OF TEETH, UNSPECIFIED CAUSE, UNSPECIFIED CLASS: ICD-10-CM

## 2024-06-27 PROCEDURE — D9310: CPT

## 2024-06-28 DIAGNOSIS — K02.9 DENTAL CARIES, UNSPECIFIED: ICD-10-CM

## 2024-07-09 ENCOUNTER — OUTPATIENT (OUTPATIENT)
Dept: OUTPATIENT SERVICES | Facility: HOSPITAL | Age: 64
LOS: 1 days | End: 2024-07-09

## 2024-07-09 DIAGNOSIS — K02.7 DENTAL ROOT CARIES: ICD-10-CM

## 2024-07-09 PROCEDURE — D9310: CPT

## 2024-07-10 DIAGNOSIS — K04.7 PERIAPICAL ABSCESS WITHOUT SINUS: ICD-10-CM

## 2024-08-27 ENCOUNTER — LABORATORY RESULT (OUTPATIENT)
Age: 64
End: 2024-08-27

## 2024-09-09 ENCOUNTER — APPOINTMENT (OUTPATIENT)
Dept: CARDIOLOGY | Facility: CLINIC | Age: 64
End: 2024-09-09
Payer: MEDICAID

## 2024-09-09 VITALS
WEIGHT: 184 LBS | DIASTOLIC BLOOD PRESSURE: 84 MMHG | HEART RATE: 62 BPM | SYSTOLIC BLOOD PRESSURE: 120 MMHG | HEIGHT: 66 IN | BODY MASS INDEX: 29.57 KG/M2

## 2024-09-09 DIAGNOSIS — E78.5 HYPERLIPIDEMIA, UNSPECIFIED: ICD-10-CM

## 2024-09-09 DIAGNOSIS — Z95.1 PRESENCE OF AORTOCORONARY BYPASS GRAFT: ICD-10-CM

## 2024-09-09 DIAGNOSIS — I10 ESSENTIAL (PRIMARY) HYPERTENSION: ICD-10-CM

## 2024-09-09 DIAGNOSIS — I25.10 ATHEROSCLEROTIC HEART DISEASE OF NATIVE CORONARY ARTERY W/OUT ANGINA PECTORIS: ICD-10-CM

## 2024-09-09 PROCEDURE — 93000 ELECTROCARDIOGRAM COMPLETE: CPT

## 2024-09-09 PROCEDURE — 99214 OFFICE O/P EST MOD 30 MIN: CPT

## 2024-09-13 NOTE — CARDIOLOGY SUMMARY
[de-identified] : 9/9/24: SR 62bpm, TWI anterolateral leads unchanged [de-identified] : TTE 5/19/24: LVEF 60-65%

## 2024-09-13 NOTE — REVIEW OF SYSTEMS
[Negative] : Heme/Lymph [Fever] : no fever [Chills] : no chills [Blurry Vision] : no blurred vision [SOB] : no shortness of breath [Dyspnea on exertion] : not dyspnea during exertion [Chest Discomfort] : no chest discomfort [Lower Ext Edema] : no extremity edema [Palpitations] : no palpitations [Syncope] : no syncope [Cough] : no cough [Wheezing] : no wheezing [Abdominal Pain] : no abdominal pain [Dizziness] : no dizziness [Confusion] : no confusion was observed

## 2024-09-13 NOTE — CARDIOLOGY SUMMARY
[de-identified] : 9/9/24: SR 62bpm, TWI anterolateral leads unchanged [de-identified] : TTE 5/19/24: LVEF 60-65%

## 2024-09-13 NOTE — ASSESSMENT
[FreeTextEntry1] : 63 YOM CAD s/p CABG  HTN controlled HLD not at goal on atorva 20mg Pre-DM  Plan: Increase Atorvastatin to 40mg QHS Continue Metoprolol tartrate 25mg BID Continue ASA and Plavix for now Repeat labs in 3 months Follow up in 3 months   Hilary Dao, BREA, FNP-BC

## 2024-09-13 NOTE — HISTORY OF PRESENT ILLNESS
[FreeTextEntry1] : 63-year-old male with a PMHx of HTN, HLD, pre-DM  Stress ECHO revealed apical,inferior, inferoseptal wall ischemia, and EF 40% with exercise, 66% @ rest. He only exercised for 3 mins and 40 secs, and the test was stopped due to SOB. Subsequent cardiac catheterization revealed advanced multi-vessel CAD, He was admitted post catheterization and on 5/21/24, he underwent surgical myocardial revascularization. Post-operatively, patient had an uneventful hospital course. He was discharged home in stable condition on POD#8.  Pt presents for a follow up visit. Pt reports feeling well. Denies chest pain, SOB, or palpitations. He is adherent to meds. BP at home normal.  9/6/24:  LDL 96 A1c 6.0

## 2024-10-14 ENCOUNTER — APPOINTMENT (OUTPATIENT)
Dept: OTOLARYNGOLOGY | Facility: CLINIC | Age: 64
End: 2024-10-14
Payer: MEDICAID

## 2024-10-14 VITALS — WEIGHT: 184 LBS | HEIGHT: 66 IN | BODY MASS INDEX: 29.57 KG/M2

## 2024-10-14 DIAGNOSIS — E04.9 NONTOXIC GOITER, UNSPECIFIED: ICD-10-CM

## 2024-10-14 DIAGNOSIS — E04.1 NONTOXIC SINGLE THYROID NODULE: ICD-10-CM

## 2024-10-14 DIAGNOSIS — Z95.1 PRESENCE OF AORTOCORONARY BYPASS GRAFT: ICD-10-CM

## 2024-10-14 DIAGNOSIS — J39.8 OTHER SPECIFIED DISEASES OF UPPER RESPIRATORY TRACT: ICD-10-CM

## 2024-10-14 PROCEDURE — 99215 OFFICE O/P EST HI 40 MIN: CPT

## 2024-10-14 PROCEDURE — G2211 COMPLEX E/M VISIT ADD ON: CPT | Mod: NC

## 2024-10-25 ENCOUNTER — NON-APPOINTMENT (OUTPATIENT)
Age: 64
End: 2024-10-25

## 2024-10-29 ENCOUNTER — APPOINTMENT (OUTPATIENT)
Dept: CARDIOTHORACIC SURGERY | Facility: CLINIC | Age: 64
End: 2024-10-29
Payer: MEDICAID

## 2024-10-29 VITALS
HEART RATE: 62 BPM | SYSTOLIC BLOOD PRESSURE: 144 MMHG | TEMPERATURE: 98.2 F | BODY MASS INDEX: 29.25 KG/M2 | HEIGHT: 66 IN | DIASTOLIC BLOOD PRESSURE: 85 MMHG | WEIGHT: 182 LBS | RESPIRATION RATE: 14 BRPM

## 2024-10-29 PROCEDURE — 99214 OFFICE O/P EST MOD 30 MIN: CPT

## 2024-11-20 ENCOUNTER — OUTPATIENT (OUTPATIENT)
Dept: OUTPATIENT SERVICES | Facility: HOSPITAL | Age: 64
LOS: 1 days | End: 2024-11-20
Payer: MEDICARE

## 2024-11-20 DIAGNOSIS — K01.1 IMPACTED TEETH: ICD-10-CM

## 2024-11-20 PROCEDURE — D0170: CPT

## 2024-11-21 DIAGNOSIS — K05.6 PERIODONTAL DISEASE, UNSPECIFIED: ICD-10-CM

## 2024-11-27 ENCOUNTER — OUTPATIENT (OUTPATIENT)
Dept: OUTPATIENT SERVICES | Facility: HOSPITAL | Age: 64
LOS: 1 days | End: 2024-11-27
Payer: MEDICARE

## 2024-11-27 DIAGNOSIS — K01.1 IMPACTED TEETH: ICD-10-CM

## 2024-11-27 PROCEDURE — D7140: CPT

## 2024-11-29 ENCOUNTER — OUTPATIENT (OUTPATIENT)
Dept: OUTPATIENT SERVICES | Facility: HOSPITAL | Age: 64
LOS: 1 days | End: 2024-11-29
Payer: COMMERCIAL

## 2024-11-29 VITALS
RESPIRATION RATE: 19 BRPM | TEMPERATURE: 98 F | OXYGEN SATURATION: 98 % | HEIGHT: 68 IN | HEART RATE: 64 BPM | WEIGHT: 182.98 LBS | SYSTOLIC BLOOD PRESSURE: 136 MMHG | DIASTOLIC BLOOD PRESSURE: 85 MMHG

## 2024-11-29 DIAGNOSIS — Z95.1 PRESENCE OF AORTOCORONARY BYPASS GRAFT: Chronic | ICD-10-CM

## 2024-11-29 DIAGNOSIS — E04.1 NONTOXIC SINGLE THYROID NODULE: ICD-10-CM

## 2024-11-29 DIAGNOSIS — Z01.818 ENCOUNTER FOR OTHER PREPROCEDURAL EXAMINATION: ICD-10-CM

## 2024-11-29 LAB
APTT BLD: 35.6 SEC — SIGNIFICANT CHANGE UP (ref 27–39.2)
BLD GP AB SCN SERPL QL: SIGNIFICANT CHANGE UP
INR BLD: 0.97 RATIO — SIGNIFICANT CHANGE UP (ref 0.65–1.3)
PROTHROM AB SERPL-ACNC: 11.4 SEC — SIGNIFICANT CHANGE UP (ref 9.95–12.87)

## 2024-11-29 PROCEDURE — 86900 BLOOD TYPING SEROLOGIC ABO: CPT

## 2024-11-29 PROCEDURE — 85730 THROMBOPLASTIN TIME PARTIAL: CPT

## 2024-11-29 PROCEDURE — 86850 RBC ANTIBODY SCREEN: CPT

## 2024-11-29 PROCEDURE — 85610 PROTHROMBIN TIME: CPT

## 2024-11-29 PROCEDURE — 36415 COLL VENOUS BLD VENIPUNCTURE: CPT

## 2024-11-29 PROCEDURE — 86901 BLOOD TYPING SEROLOGIC RH(D): CPT

## 2024-11-29 PROCEDURE — 99214 OFFICE O/P EST MOD 30 MIN: CPT | Mod: 25

## 2024-11-29 NOTE — H&P PST ADULT - REASON FOR ADMISSION
64 year old male presents for TOTAL THYROIDECTOMY, POSSIBLE STERNOTOMY, NERVE MONITORING with Dr. Apple under general anesthesia

## 2024-11-29 NOTE — H&P PST ADULT - NSICDXPASTMEDICALHX_GEN_ALL_CORE_FT
PAST MEDICAL HISTORY:  CAD (coronary artery disease)     Chronic kidney disease (CKD)     H/O thyroid nodule     HTN (hypertension)     Hyperlipidemia

## 2024-11-29 NOTE — H&P PST ADULT - HISTORY OF PRESENT ILLNESS
PATIENT / GUARDIAN CURRENTLY DENIES CHEST PAIN  SHORTNESS OF BREATH  PALPITATIONS,  DYSURIA, OR UPPER RESPIRATORY INFECTION IN PAST 2 WEEKS  Patient / Guardian verbalized understanding of instructions and was given the opportunity to ask questions and have them answered.  As per patient, this is their complete medical and surgical history, including medications both prescribed or over the counter.  written and verbal instructions with teach back on chlorhexidine shampoo provided,  pt verbalized understanding with returned demonstration    Anesthesia Alert  NO--Difficult Airway  NO--History of neck surgery or radiation  NO--Limited ROM of neck  NO--History of Malignant hyperthermia  NO--Personal or family history of Pseudocholinesterase deficiency  NO--Prior Anesthesia Complication  NO--Latex Allergy  YES--Loose teeth- 12/4/25 appointment for extraction   NO--History of Rheumatoid Arthritis  NO--BROOKE   YES--Bleeding risk- Plavix and aspirin   NO--Other_____  Mallampati airway: Class I     [facility] Activity Status Index (DASI) from VB Rags  on 11/29/2024  ** All calculations should be rechecked by clinician prior to use **    RESULT SUMMARY:  50.7 points  The higher the score (maximum 58.2), the higher the functional status.    8.97 METs        INPUTS:  Take care of self —> 2.75 = Yes  Walk indoors —> 1.75 = Yes  Walk 1&ndash;2 blocks on level ground —> 2.75 = Yes  Climb a flight of stairs or walk up a hill —> 5.5 = Yes  Run a short distance —> 8 = Yes  Do light work around the house —> 2.7 = Yes  Do moderate work around the house —> 3.5 = Yes  Do heavy work around the house —> 8 = Yes  Do yardwork —> 4.5 = Yes  Have sexual relations —> 5.25 = Yes  Participate in moderate recreational activities —> 6 = Yes  Participate in strenuous sports —> 0 = No    Revised Cardiac Risk Index for Pre-Operative Risk from VB Rags  on 11/29/2024  ** All calculations should be rechecked by clinician prior to use **    RESULT SUMMARY:  2 points  RCRI Score    10.1 %  Risk of major cardiac event      INPUTS:  High-risk surgery —> 1 = Yes  History of ischemic heart disease —> 1 = Yes  History of congestive heart failure —> 0 = No  History of cerebrovascular disease —> 0 = No  Pre-operative treatment with insulin —> 0 = No  Pre-operative creatinine >2 mg/dL / 176.8 µmol/L —> 0 = No

## 2024-11-30 DIAGNOSIS — E04.1 NONTOXIC SINGLE THYROID NODULE: ICD-10-CM

## 2024-11-30 DIAGNOSIS — Z01.818 ENCOUNTER FOR OTHER PREPROCEDURAL EXAMINATION: ICD-10-CM

## 2024-12-02 DIAGNOSIS — K05.6 PERIODONTAL DISEASE, UNSPECIFIED: ICD-10-CM

## 2024-12-04 ENCOUNTER — OUTPATIENT (OUTPATIENT)
Dept: OUTPATIENT SERVICES | Facility: HOSPITAL | Age: 64
LOS: 1 days | End: 2024-12-04
Payer: COMMERCIAL

## 2024-12-04 DIAGNOSIS — Z95.1 PRESENCE OF AORTOCORONARY BYPASS GRAFT: Chronic | ICD-10-CM

## 2024-12-04 DIAGNOSIS — K01.1 IMPACTED TEETH: ICD-10-CM

## 2024-12-04 PROBLEM — Z86.39 PERSONAL HISTORY OF OTHER ENDOCRINE, NUTRITIONAL AND METABOLIC DISEASE: Chronic | Status: ACTIVE | Noted: 2024-11-29

## 2024-12-04 PROBLEM — I25.10 ATHEROSCLEROTIC HEART DISEASE OF NATIVE CORONARY ARTERY WITHOUT ANGINA PECTORIS: Chronic | Status: ACTIVE | Noted: 2024-11-29

## 2024-12-04 PROCEDURE — D7140: CPT

## 2024-12-06 DIAGNOSIS — K05.6 PERIODONTAL DISEASE, UNSPECIFIED: ICD-10-CM

## 2024-12-09 ENCOUNTER — APPOINTMENT (OUTPATIENT)
Dept: CARDIOLOGY | Facility: CLINIC | Age: 64
End: 2024-12-09
Payer: MEDICAID

## 2024-12-09 VITALS
HEART RATE: 62 BPM | HEIGHT: 68 IN | SYSTOLIC BLOOD PRESSURE: 116 MMHG | OXYGEN SATURATION: 99 % | WEIGHT: 183 LBS | BODY MASS INDEX: 27.74 KG/M2 | DIASTOLIC BLOOD PRESSURE: 82 MMHG

## 2024-12-09 DIAGNOSIS — I25.10 ATHEROSCLEROTIC HEART DISEASE OF NATIVE CORONARY ARTERY W/OUT ANGINA PECTORIS: ICD-10-CM

## 2024-12-09 DIAGNOSIS — I10 ESSENTIAL (PRIMARY) HYPERTENSION: ICD-10-CM

## 2024-12-09 DIAGNOSIS — Z01.818 ENCOUNTER FOR OTHER PREPROCEDURAL EXAMINATION: ICD-10-CM

## 2024-12-09 DIAGNOSIS — E78.5 HYPERLIPIDEMIA, UNSPECIFIED: ICD-10-CM

## 2024-12-09 PROCEDURE — 99214 OFFICE O/P EST MOD 30 MIN: CPT

## 2024-12-10 NOTE — ASU PATIENT PROFILE, ADULT - BILL OF RIGHTS/ADMISSION INFORMATION PROVIDED TO:
Patient Inter-incisor distance: < 3cm  HMD: > 6cm  Submandibular space: normal compliance  Neck:  normal length and thickness  Head/Neck:  FROM

## 2024-12-11 ENCOUNTER — APPOINTMENT (OUTPATIENT)
Dept: OTOLARYNGOLOGY | Facility: HOSPITAL | Age: 64
End: 2024-12-11

## 2024-12-11 ENCOUNTER — INPATIENT (INPATIENT)
Facility: HOSPITAL | Age: 64
LOS: 0 days | Discharge: HOME | DRG: 645 | End: 2024-12-12
Attending: STUDENT IN AN ORGANIZED HEALTH CARE EDUCATION/TRAINING PROGRAM | Admitting: STUDENT IN AN ORGANIZED HEALTH CARE EDUCATION/TRAINING PROGRAM
Payer: COMMERCIAL

## 2024-12-11 VITALS
HEIGHT: 68 IN | WEIGHT: 182.98 LBS | DIASTOLIC BLOOD PRESSURE: 73 MMHG | SYSTOLIC BLOOD PRESSURE: 123 MMHG | OXYGEN SATURATION: 97 % | HEART RATE: 59 BPM | RESPIRATION RATE: 18 BRPM | TEMPERATURE: 98 F

## 2024-12-11 DIAGNOSIS — E04.1 NONTOXIC SINGLE THYROID NODULE: ICD-10-CM

## 2024-12-11 DIAGNOSIS — Z95.1 PRESENCE OF AORTOCORONARY BYPASS GRAFT: Chronic | ICD-10-CM

## 2024-12-11 LAB
ANION GAP SERPL CALC-SCNC: 14 MMOL/L — SIGNIFICANT CHANGE UP (ref 7–14)
BUN SERPL-MCNC: 18 MG/DL — SIGNIFICANT CHANGE UP (ref 10–20)
CALCIUM SERPL-MCNC: 8.7 MG/DL — SIGNIFICANT CHANGE UP (ref 8.4–10.4)
CALCIUM SERPL-MCNC: 9.1 MG/DL — SIGNIFICANT CHANGE UP (ref 8.4–10.5)
CHLORIDE SERPL-SCNC: 101 MMOL/L — SIGNIFICANT CHANGE UP (ref 98–110)
CO2 SERPL-SCNC: 24 MMOL/L — SIGNIFICANT CHANGE UP (ref 17–32)
CREAT SERPL-MCNC: 1.2 MG/DL — SIGNIFICANT CHANGE UP (ref 0.7–1.5)
EGFR: 68 ML/MIN/1.73M2 — SIGNIFICANT CHANGE UP
GLUCOSE SERPL-MCNC: 149 MG/DL — HIGH (ref 70–99)
POTASSIUM SERPL-MCNC: 4.6 MMOL/L — SIGNIFICANT CHANGE UP (ref 3.5–5)
POTASSIUM SERPL-SCNC: 4.6 MMOL/L — SIGNIFICANT CHANGE UP (ref 3.5–5)
PTH-INTACT IO % DIF SERPL: 36 PG/ML — SIGNIFICANT CHANGE UP (ref 19–73)
SODIUM SERPL-SCNC: 139 MMOL/L — SIGNIFICANT CHANGE UP (ref 135–146)

## 2024-12-11 PROCEDURE — 82310 ASSAY OF CALCIUM: CPT

## 2024-12-11 PROCEDURE — 85027 COMPLETE CBC AUTOMATED: CPT

## 2024-12-11 PROCEDURE — 88307 TISSUE EXAM BY PATHOLOGIST: CPT | Mod: 26

## 2024-12-11 PROCEDURE — 80048 BASIC METABOLIC PNL TOTAL CA: CPT

## 2024-12-11 PROCEDURE — 36415 COLL VENOUS BLD VENIPUNCTURE: CPT

## 2024-12-11 PROCEDURE — 83970 ASSAY OF PARATHORMONE: CPT

## 2024-12-11 RX ORDER — METOPROLOL TARTRATE 100 MG/1
25 TABLET, FILM COATED ORAL
Refills: 0 | Status: DISCONTINUED | OUTPATIENT
Start: 2024-12-11 | End: 2024-12-12

## 2024-12-11 RX ORDER — LEVOTHYROXINE SODIUM 150 MCG
100 TABLET ORAL DAILY
Refills: 0 | Status: DISCONTINUED | OUTPATIENT
Start: 2024-12-11 | End: 2024-12-12

## 2024-12-11 RX ORDER — CALCITRIOL 0.5 UG/1
0.25 CAPSULE, LIQUID FILLED ORAL EVERY 12 HOURS
Refills: 0 | Status: DISCONTINUED | OUTPATIENT
Start: 2024-12-11 | End: 2024-12-12

## 2024-12-11 RX ORDER — CALCIUM CARBONATE 500(1250)
2 TABLET ORAL EVERY 8 HOURS
Refills: 0 | Status: DISCONTINUED | OUTPATIENT
Start: 2024-12-11 | End: 2024-12-12

## 2024-12-11 RX ORDER — ONDANSETRON HYDROCHLORIDE 4 MG/1
4 TABLET, FILM COATED ORAL ONCE
Refills: 0 | Status: COMPLETED | OUTPATIENT
Start: 2024-12-11 | End: 2024-12-11

## 2024-12-11 RX ORDER — CLOPIDOGREL 75 MG/1
75 TABLET, FILM COATED ORAL DAILY
Refills: 0 | Status: DISCONTINUED | OUTPATIENT
Start: 2024-12-12 | End: 2024-12-12

## 2024-12-11 RX ORDER — LEVOTHYROXINE SODIUM 150 MCG
137 TABLET ORAL DAILY
Refills: 0 | Status: DISCONTINUED | OUTPATIENT
Start: 2024-12-11 | End: 2024-12-11

## 2024-12-11 RX ORDER — 0.9 % SODIUM CHLORIDE 0.9 %
1000 INTRAVENOUS SOLUTION INTRAVENOUS
Refills: 0 | Status: DISCONTINUED | OUTPATIENT
Start: 2024-12-11 | End: 2024-12-11

## 2024-12-11 RX ORDER — HYDROMORPHONE HYDROCHLORIDE 2 MG/1
1 TABLET ORAL
Refills: 0 | Status: DISCONTINUED | OUTPATIENT
Start: 2024-12-11 | End: 2024-12-11

## 2024-12-11 RX ORDER — 0.9 % SODIUM CHLORIDE 0.9 %
1000 INTRAVENOUS SOLUTION INTRAVENOUS
Refills: 0 | Status: COMPLETED | OUTPATIENT
Start: 2024-12-11 | End: 2024-12-11

## 2024-12-11 RX ORDER — HYDROMORPHONE HYDROCHLORIDE 2 MG/1
0.5 TABLET ORAL
Refills: 0 | Status: DISCONTINUED | OUTPATIENT
Start: 2024-12-11 | End: 2024-12-11

## 2024-12-11 RX ORDER — ACETAMINOPHEN 500MG 500 MG/1
650 TABLET, COATED ORAL EVERY 6 HOURS
Refills: 0 | Status: DISCONTINUED | OUTPATIENT
Start: 2024-12-11 | End: 2024-12-12

## 2024-12-11 RX ADMIN — CALCITRIOL 0.25 MICROGRAM(S): 0.5 CAPSULE, LIQUID FILLED ORAL at 17:07

## 2024-12-11 RX ADMIN — Medication 100 MILLILITER(S): at 11:38

## 2024-12-11 RX ADMIN — METOPROLOL TARTRATE 25 MILLIGRAM(S): 100 TABLET, FILM COATED ORAL at 17:08

## 2024-12-11 RX ADMIN — ONDANSETRON HYDROCHLORIDE 4 MILLIGRAM(S): 4 TABLET, FILM COATED ORAL at 14:19

## 2024-12-11 RX ADMIN — Medication 100 MILLILITER(S): at 21:45

## 2024-12-11 RX ADMIN — Medication 2 TABLET(S): at 21:44

## 2024-12-11 RX ADMIN — Medication 40 MILLIGRAM(S): at 21:45

## 2024-12-11 NOTE — BRIEF OPERATIVE NOTE - OPERATION/FINDINGS
Large left thyroid mass extending substernal, total thyroidectomy with intraoperative nerve monitoring, both RLNs identified and preserved. 15 Fr tenisha cazares.

## 2024-12-11 NOTE — PATIENT PROFILE ADULT - FUNCTIONAL SCREEN CURRENT LEVEL: COMMUNICATION, MLM
ED to inpatient nurses report      Chief Complaint:  Chief Complaint   Patient presents with    Post-op Problem     Present to ED from: Home    MOA:     LOC: alert and orientated to name, place, date  Mobility: Independent  Oxygen Baseline: RA    Current needs required: 0     Code Status:   Full Code    What abnormal results were found and what did you give/do to treat them? Post op infection; antibiotics, labs  Any procedures or intervention occur? NA    Mental Status:  Level of Consciousness: Alert (0)    Psych Assessment:        Vitals:  Patient Vitals for the past 24 hrs:   BP Temp Temp src Pulse Resp SpO2 Height Weight   10/28/24 2003 (!) 141/84 -- -- 78 20 99 % -- --   10/28/24 2001 -- -- -- 78 20 99 % -- --   10/28/24 1923 137/78 -- -- 82 24 100 % -- --   10/28/24 1850 126/75 -- -- 75 18 100 % -- --   10/28/24 1750 134/83 -- -- 77 20 100 % -- --   10/28/24 1659 136/81 -- -- 74 19 99 % -- --   10/28/24 1600 (!) 134/56 -- -- -- -- -- -- --   10/28/24 1557 (!) 139/111 98.6 °F (37 °C) Oral 78 16 100 % 1.626 m (5' 4\") 95.7 kg (211 lb)        LDAs:   Peripheral IV 10/28/24 Left Antecubital (Active)   Site Assessment Clean, dry & intact 10/28/24 2004   Line Status Infusing 10/28/24 2004   Phlebitis Assessment No symptoms 10/28/24 2004   Infiltration Assessment 0 10/28/24 2004   Dressing Status Clean, dry & intact 10/28/24 2004   Dressing Type Transparent 10/28/24 2004       Ambulatory Status:  Presents to emergency department  because of falls (Syncope, seizure, or loss of consciousness): No, Age > 70: No, Altered Mental Status, Intoxication with alcohol or substance confusion (Disorientation, impaired judgment, poor safety awaremess, or inability to follow instructions): No, Impaired Mobility: Ambulates or transfers with assistive devices or assistance; Unable to ambulate or transer.: No    Diagnosis:  DISPOSITION Admitted 10/28/2024 07:10:36 PM   Final diagnoses:   Postoperative infection, unspecified type,  GERD (gastroesophageal reflux disease)     Hashimoto's thyroiditis 12/22/2016    HIGH CHOLESTEROL     Hyperlipidemia     Hypokalemia     Kidney stones     Prediabetes 06/01/2020    Subclinical hyperthyroidism 11/08/2016           Electronically signed by Mikki Yee RN on 10/28/2024 at 8:10 PM     0 = understands/communicates without difficulty

## 2024-12-12 ENCOUNTER — TRANSCRIPTION ENCOUNTER (OUTPATIENT)
Age: 64
End: 2024-12-12

## 2024-12-12 VITALS
RESPIRATION RATE: 18 BRPM | OXYGEN SATURATION: 96 % | SYSTOLIC BLOOD PRESSURE: 112 MMHG | DIASTOLIC BLOOD PRESSURE: 74 MMHG | HEART RATE: 70 BPM | TEMPERATURE: 98 F

## 2024-12-12 LAB
HCT VFR BLD CALC: 42.1 % — SIGNIFICANT CHANGE UP (ref 42–52)
HGB BLD-MCNC: 13.8 G/DL — LOW (ref 14–18)
MCHC RBC-ENTMCNC: 28.5 PG — SIGNIFICANT CHANGE UP (ref 27–31)
MCHC RBC-ENTMCNC: 32.8 G/DL — SIGNIFICANT CHANGE UP (ref 32–37)
MCV RBC AUTO: 87 FL — SIGNIFICANT CHANGE UP (ref 80–94)
NRBC # BLD: 0 /100 WBCS — SIGNIFICANT CHANGE UP (ref 0–0)
PLATELET # BLD AUTO: 211 K/UL — SIGNIFICANT CHANGE UP (ref 130–400)
PMV BLD: 10.6 FL — HIGH (ref 7.4–10.4)
PTH-INTACT IO % DIF SERPL: 23.8 PG/ML — SIGNIFICANT CHANGE UP (ref 19–73)
RBC # BLD: 4.84 M/UL — SIGNIFICANT CHANGE UP (ref 4.7–6.1)
RBC # FLD: 13.2 % — SIGNIFICANT CHANGE UP (ref 11.5–14.5)
WBC # BLD: 11.37 K/UL — HIGH (ref 4.8–10.8)
WBC # FLD AUTO: 11.37 K/UL — HIGH (ref 4.8–10.8)

## 2024-12-12 RX ORDER — CALCIUM CARBONATE 500(1250)
2 TABLET ORAL
Qty: 84 | Refills: 0
Start: 2024-12-12 | End: 2024-12-25

## 2024-12-12 RX ORDER — OXYCODONE AND ACETAMINOPHEN 5; 325 MG/1; MG/1
1 TABLET ORAL
Qty: 12 | Refills: 0
Start: 2024-12-12 | End: 2024-12-14

## 2024-12-12 RX ORDER — LEVOTHYROXINE SODIUM 150 MCG
1 TABLET ORAL
Qty: 14 | Refills: 0
Start: 2024-12-12 | End: 2024-12-25

## 2024-12-12 RX ORDER — CALCITRIOL 0.5 UG/1
1 CAPSULE, LIQUID FILLED ORAL
Qty: 28 | Refills: 0
Start: 2024-12-12 | End: 2024-12-25

## 2024-12-12 RX ADMIN — METOPROLOL TARTRATE 25 MILLIGRAM(S): 100 TABLET, FILM COATED ORAL at 05:44

## 2024-12-12 RX ADMIN — Medication 100 MICROGRAM(S): at 05:43

## 2024-12-12 RX ADMIN — Medication 81 MILLIGRAM(S): at 12:15

## 2024-12-12 RX ADMIN — Medication 2 TABLET(S): at 13:09

## 2024-12-12 RX ADMIN — CALCITRIOL 0.25 MICROGRAM(S): 0.5 CAPSULE, LIQUID FILLED ORAL at 05:44

## 2024-12-12 RX ADMIN — Medication 2 TABLET(S): at 05:43

## 2024-12-12 NOTE — DISCHARGE NOTE NURSING/CASE MANAGEMENT/SOCIAL WORK - PATIENT PORTAL LINK FT
You can access the FollowMyHealth Patient Portal offered by [facility] by registering at the following website: [email or web address]. By joining [facility]’s FollowMyHealth portal, you will also be able to view your health information using other applications (apps) compatible with our system.

## 2024-12-12 NOTE — DISCHARGE NOTE PROVIDER - NSDCCPCAREPLAN_GEN_ALL_CORE_FT
PRINCIPAL DISCHARGE DIAGNOSIS  Diagnosis: Nontoxic goiter, unspecified-E04.9  Assessment and Plan of Treatment: Community Status: Active

## 2024-12-12 NOTE — PROGRESS NOTE ADULT - SUBJECTIVE AND OBJECTIVE BOX
ENT: Pt is a 63y/o M s/p total thyroidectomy POD # 0. Pt seen and examined at bedside. No acute complaints at this time. Denies hoarseness/difficulty breathing/swallowing at this time.     REVIEW OF SYSTEMS   [x] A ten-point review of systems was otherwise negative except as noted.    Allergies  No Known Allergies    Vital Signs Last 24 Hrs  T(C): 36.3 (11 Dec 2024 14:55), Max: 36.9 (11 Dec 2024 06:44)  T(F): 97.4 (11 Dec 2024 14:55), Max: 98.5 (11 Dec 2024 06:44)  HR: 84 (11 Dec 2024 14:55) (59 - 95)  BP: 148/96 (11 Dec 2024 14:55) (106/59 - 163/93)  RR: 18 (11 Dec 2024 14:55) (16 - 18)  SpO2: 95% (11 Dec 2024 14:55) (94% - 100%)    PHYSICAL EXAM:  GEN: NAD, awake and alert. No drooling or pooling of secretions. No stridor or stertor. Good vocal quality, no hoarseness.   SKIN: Good color, non diaphoretic  HEENT: Oral mucosa pink and moist. No erythema or edema noted to buccal mucosa, tongue, FOM, uvula or posterior oropharynx. Uvula midline  NECK: Trachea midline. Incision c/d/i. NAZARIO with serosanguinous output.   RESP: Non-labored breathing. No use of accessory muscles.  CARDIO: +S1/S2  ABDO: Soft, NT.  EXT: GUTIERREZ x 4    LABS:  Ca    9.1        11 Dec 2024 11:30  Calcium: 9.1 mg/dL (12-11 @ 11:30)  PTH Intact, Intraoperative.: 36.0 pg/mL (12-11 @ 11:30)            
ENT Progress Note:   Pt is a 63y/o M s/p total thyroidectomy POD # 0. Pt seen and examined at bedside. No acute complaints at this time. Denies hoarseness/difficulty breathing/swallowing at this time. Pt tolerating po without issues.     [ x ] A 10 Point Review of Systems was negative except where noted    PAST MEDICAL & SURGICAL HISTORY:  HTN (hypertension)  Hyperlipidemia  Chronic kidney disease (CKD)  CAD (coronary artery disease)  H/O thyroid nodule  S/P CABG (coronary artery bypass graft)    Allergies: No Known Allergies    MEDICATIONS  (STANDING):  acetaminophen     Tablet .. 650 milliGRAM(s) Oral every 6 hours  aspirin enteric coated 81 milliGRAM(s) Oral daily  atorvastatin 40 milliGRAM(s) Oral at bedtime  calcitriol   Capsule 0.25 MICROGram(s) Oral every 12 hours  calcium carbonate   1250 mG (OsCal) 2 Tablet(s) Oral every 8 hours  clopidogrel Tablet 75 milliGRAM(s) Oral daily  levothyroxine 100 MICROGram(s) Oral daily  metoprolol tartrate 25 milliGRAM(s) Oral two times a day     Vital Signs Last 24 Hrs  T(C): 37 (12 Dec 2024 08:05), Max: 37 (12 Dec 2024 08:05)  T(F): 98.6 (12 Dec 2024 08:05), Max: 98.6 (12 Dec 2024 08:05)  HR: 79 (12 Dec 2024 08:05) (79 - 95)  BP: 109/66 (12 Dec 2024 08:05) (109/66 - 163/93)  RR: 18 (12 Dec 2024 08:05) (16 - 18)  SpO2: 99% (12 Dec 2024 08:05) (94% - 100%)    Parameters below as of 11 Dec 2024 14:15  Patient On (Oxygen Delivery Method): room air    PHYSICAL EXAM:  Gen: Well-developed, well-nourished, NAD.  No drooling or pooling of secretions.  Good vocal quality, no hoarseness  Skin: Good color, non diaphoretic  Head: NC/AT.   EENT: B/l EACs clear, TMs intact. Nares bilaterally patent, no blood or discharge noted. Oral cavity no erythema/edema. Tongue wnl, uvula midline, no tenderness throughout FOM. Posterior oropharynx clear, no blood/discharge noted.   Neck: Trachea midline, supple  Resp: Breathing easily, no accessory muscle use, no stridor or stertor.    Cardio: +S1/S2  Abd: Soft, nontender, nondistended  Neuro: Awake and alert  Psych: Normal mood, normal affect  Ext: No peripheral edema/cyanosis, GUTIERREZ x 4    LABS:                     13.8   11.37 )-----------( 211      ( 12 Dec 2024 05:51 )             42.1     12-11  139  |  101  |  18  ----------------------------<  149[H]  4.6   |  24  |  1.2    Ca    8.7      11 Dec 2024 21:24    Urinalysis Basic - ( 11 Dec 2024 21:24 )  Color: x / Appearance: x / SG: x / pH: x  Gluc: 149 mg/dL / Ketone: x  / Bili: x / Urobili: x   Blood: x / Protein: x / Nitrite: x   Leuk Esterase: x / RBC: x / WBC x   Sq Epi: x / Non Sq Epi: x / Bacteria: x

## 2024-12-12 NOTE — DISCHARGE NOTE PROVIDER - HOSPITAL COURSE
Pt is a 64 year old male with a pmh of HTN, HLD, CKD, CAD, CABG and thyroid nodules admitted s/p total thyroidectomy on 12/11/24. Patient had an uncomplicated intraoperative and PACU course and was transferred to the floor in stable condition for overnight observation. Patient did well overnight and had no acute events. On POD#1, patient was seen and examined at bedside. No acute complaints. Patient breathing and voicing well, had tolerated po intake without issues and his pain remained controlled. Patient had his drain removed without issues and was discharged in stable  condition.

## 2024-12-12 NOTE — DISCHARGE NOTE PROVIDER - CARE PROVIDER_API CALL
Ck Apple  Head/Neck Surgery  [address] [place] [id number]  Phone: [phone]  Fax: [phone]  Follow Up Time: 1 week

## 2024-12-12 NOTE — DISCHARGE NOTE NURSING/CASE MANAGEMENT/SOCIAL WORK - FINANCIAL ASSISTANCE
[facility] provides services at a reduced cost to those who are determined to be eligible through [facility]’s financial assistance program. Information regarding [facility]’s financial assistance program can be found by going to [email or web address] or by calling [phone].

## 2024-12-12 NOTE — DISCHARGE NOTE PROVIDER - NSDCFUSCHEDAPPT_GEN_ALL_CORE_FT
[facility]  CARDIOLOGY [address]  Scheduled Appointment: 03/10/2025

## 2024-12-12 NOTE — DISCHARGE NOTE PROVIDER - NSDCACTIVITY_GEN_ALL_CORE
Return to Work/School allowed/Bathing allowed/Sex allowed/Showering allowed/Stairs allowed/Walking - Indoors allowed/No heavy lifting/straining/Walking - Outdoors allowed/Follow Instructions Provided by your Surgical Team/Activity as tolerated

## 2024-12-12 NOTE — PROGRESS NOTE ADULT - ASSESSMENT
63y/o M s/p total thyroidectomy POD # 0.    - Admit to ENT/4C for overnight observation  - Advance diet as tolerated  - Ambulate as tolerated  - Monitor drain outputs  - AM labs tomorrow  - If Hb stable, restart ASA/Plavix tomorrow as per Dr. Apple  - Anticipate d/c home tomorrow . 
Pt is a 65y/o M s/p total thyroidectomy POD # 0. Pt seen and examined at bedside. No acute complaints at this time. Denies hoarseness/difficulty breathing/swallowing at this time. Pt tolerating po without issues.     Plan:  - No further acute ENT/surgical intervention indicated at this time  - Possible removal of neck drain today  - CBC stable  - Tolerating po without issues  - Cont calc carb, rocaltrol, levothyroxine  - Endocrine and ENT f/u as OP  - Possible d/c today  - Will d/w attending

## 2024-12-12 NOTE — DISCHARGE NOTE PROVIDER - NSDCMRMEDTOKEN_GEN_ALL_CORE_FT
aspirin 81 mg oral delayed release tablet: 1 tab(s) orally once a day  clopidogrel 75 mg oral tablet: 1 tab(s) orally once a day  levothyroxine 100 mcg (0.1 mg) oral tablet: 1 tab(s) orally once a day take on empty stomach 1 hr prior to breakfast MDD: 1  Lipitor 40 mg oral tablet: 1 tab(s) orally once a day  metoprolol tartrate 25 mg oral tablet: 1 tab(s) orally 2 times a day  Oyster Theron 1250 mg (500 mg elemental calcium) oral tablet: 2 tab(s) orally every 8 hours MDD: 6  Percocet 5 mg-325 mg oral tablet: 1 tab(s) orally every 6 hours x 3 days as needed for  severe pain MDD: 4  Rocaltrol 0.25 mcg oral capsule: 1 cap(s) orally every 12 hours MDD: 2  Vit D:    aspirin 81 mg oral delayed release tablet: 1 tab(s) orally once a day  levothyroxine 100 mcg (0.1 mg) oral tablet: 1 tab(s) orally once a day take on empty stomach 1 hr prior to breakfast MDD: 1  Lipitor 40 mg oral tablet: 1 tab(s) orally once a day  metoprolol tartrate 25 mg oral tablet: 1 tab(s) orally 2 times a day  Oyster Theron 1250 mg (500 mg elemental calcium) oral tablet: 2 tab(s) orally every 8 hours MDD: 6  Percocet 5 mg-325 mg oral tablet: 1 tab(s) orally every 6 hours x 3 days as needed for  severe pain MDD: 4  Rocaltrol 0.25 mcg oral capsule: 1 cap(s) orally every 12 hours MDD: 2  Vit D:

## 2024-12-19 ENCOUNTER — APPOINTMENT (OUTPATIENT)
Dept: OTOLARYNGOLOGY | Facility: CLINIC | Age: 64
End: 2024-12-19
Payer: MEDICAID

## 2024-12-19 VITALS — BODY MASS INDEX: 27.28 KG/M2 | HEIGHT: 68 IN | WEIGHT: 180 LBS

## 2024-12-19 DIAGNOSIS — E89.0 POSTPROCEDURAL HYPOTHYROIDISM: ICD-10-CM

## 2024-12-19 DIAGNOSIS — E04.9 NONTOXIC GOITER, UNSPECIFIED: ICD-10-CM

## 2024-12-19 LAB
CALCIUM SERPL-MCNC: 9.7 MG/DL
TSH SERPL-ACNC: 2.15 UIU/ML

## 2024-12-19 PROCEDURE — 99024 POSTOP FOLLOW-UP VISIT: CPT

## 2024-12-20 LAB
CALCIUM SERPL-MCNC: 9.7 MG/DL
PARATHYROID HORMONE INTACT: 41 PG/ML

## 2024-12-24 DIAGNOSIS — E78.5 HYPERLIPIDEMIA, UNSPECIFIED: ICD-10-CM

## 2024-12-24 DIAGNOSIS — N18.9 CHRONIC KIDNEY DISEASE, UNSPECIFIED: ICD-10-CM

## 2024-12-24 DIAGNOSIS — Z95.1 PRESENCE OF AORTOCORONARY BYPASS GRAFT: ICD-10-CM

## 2024-12-24 DIAGNOSIS — Z86.39 PERSONAL HISTORY OF OTHER ENDOCRINE, NUTRITIONAL AND METABOLIC DISEASE: ICD-10-CM

## 2024-12-24 DIAGNOSIS — I25.10 ATHEROSCLEROTIC HEART DISEASE OF NATIVE CORONARY ARTERY WITHOUT ANGINA PECTORIS: ICD-10-CM

## 2024-12-24 DIAGNOSIS — I12.9 HYPERTENSIVE CHRONIC KIDNEY DISEASE WITH STAGE 1 THROUGH STAGE 4 CHRONIC KIDNEY DISEASE, OR UNSPECIFIED CHRONIC KIDNEY DISEASE: ICD-10-CM

## 2024-12-24 DIAGNOSIS — E04.1 NONTOXIC SINGLE THYROID NODULE: ICD-10-CM

## 2024-12-27 LAB — SURGICAL PATHOLOGY STUDY: SIGNIFICANT CHANGE UP

## 2025-01-02 ENCOUNTER — OUTPATIENT (OUTPATIENT)
Dept: OUTPATIENT SERVICES | Facility: HOSPITAL | Age: 65
LOS: 1 days | End: 2025-01-02
Payer: COMMERCIAL

## 2025-01-02 ENCOUNTER — EMERGENCY (EMERGENCY)
Facility: HOSPITAL | Age: 65
LOS: 0 days | Discharge: HOME | End: 2025-01-02
Attending: EMERGENCY MEDICINE

## 2025-01-02 VITALS
HEIGHT: 68 IN | DIASTOLIC BLOOD PRESSURE: 97 MMHG | OXYGEN SATURATION: 99 % | TEMPERATURE: 99 F | RESPIRATION RATE: 18 BRPM | SYSTOLIC BLOOD PRESSURE: 150 MMHG | HEART RATE: 73 BPM

## 2025-01-02 DIAGNOSIS — Z95.1 PRESENCE OF AORTOCORONARY BYPASS GRAFT: Chronic | ICD-10-CM

## 2025-01-02 DIAGNOSIS — K01.1 IMPACTED TEETH: ICD-10-CM

## 2025-01-02 PROCEDURE — 99284 EMERGENCY DEPT VISIT MOD MDM: CPT | Mod: 25

## 2025-01-02 PROCEDURE — 99284 EMERGENCY DEPT VISIT MOD MDM: CPT

## 2025-01-02 PROCEDURE — D7140: CPT

## 2025-01-02 PROCEDURE — 12011 RPR F/E/E/N/L/M 2.5 CM/<: CPT

## 2025-01-02 RX ORDER — AMOXICILLIN 250 MG
1 CAPSULE ORAL
Qty: 21 | Refills: 0
Start: 2025-01-02 | End: 2025-01-08

## 2025-01-02 RX ORDER — TRANEXAMIC ACID 100 MG/ML
5 INJECTION INTRAVENOUS ONCE
Refills: 0 | Status: DISCONTINUED | OUTPATIENT
Start: 2025-01-02 | End: 2025-01-02

## 2025-01-02 NOTE — ED PROVIDER NOTE - NS ED ROS FT
Constitutional: (-) fever  Cardiovascular: (-) syncope  Integumentary: (-) rash  Heent; see Hpi  Neurological: (-) altered mental status

## 2025-01-02 NOTE — ED PROVIDER NOTE - CONDUCTED BY FOR CONSULTANT
Pt assisted to bathroom, unable to have BM. Pt reports returned feeling of clamminess and pain. MD aware. Awaiting additional medications. Dr. Ashraf

## 2025-01-02 NOTE — ED PROVIDER NOTE - OBJECTIVE STATEMENT
64 yold male to Ed Pmhx cagg, Htn, hLd, ckd, s/p thyroidectomy;  pt presents to ED c/o bleeding s/p 2 teeth extraction earlier today at our dental clinic; pt currently on asa 81 mg; pt was holdign pressure with gauze but not effective; pt denies feeling lightheaded, dizzy, weak or presyncopal;

## 2025-01-02 NOTE — CONSULT NOTE ADULT - ASSESSMENT
Patient is a 64y old  Male who presents with a chief complaint of Bleeding after extraction    HPI:  pt present with bleeding after ext #2,3 today in dental clinic.     PAST MEDICAL & SURGICAL HISTORY:  HTN (hypertension)      Hyperlipidemia      Chronic kidney disease (CKD)      CAD (coronary artery disease)      H/O thyroid nodule      S/P CABG (coronary artery bypass graft)          MEDICATIONS  (STANDING):  tranexamic acid Injectable for Topical Use 5 milliLiter(s) Topical once    MEDICATIONS  (PRN):      Allergies    No Known Allergies    Intolerances        FAMILY HISTORY:      *SOCIAL HISTORY: (  - ) Tobacco; (  - ) ETOH    *Last Dental Visit: today    Vital Signs Last 24 Hrs  T(C): 37 (02 Jan 2025 20:37), Max: 37 (02 Jan 2025 20:37)  T(F): 98.6 (02 Jan 2025 20:37), Max: 98.6 (02 Jan 2025 20:37)  HR: 73 (02 Jan 2025 20:37) (73 - 73)  BP: 150/97 (02 Jan 2025 20:37) (150/97 - 150/97)  BP(mean): --  RR: 18 (02 Jan 2025 20:37) (18 - 18)  SpO2: 99% (02 Jan 2025 20:37) (99% - 99%)    Parameters below as of 02 Jan 2025 20:37  Patient On (Oxygen Delivery Method): room air               *ASSESSMENT:  Intra orally liver clot formation at the site of the extraction . no sign of infection or swelling.     *PLAN:  curettage and achieving hemostasis.  PROCEDURE:   Verbal  consent given.  Anesthesia: 1 cc of lidocaine 1:1ook epi  given  Treatment: curettage for removal of abnormal clot and any residual  tissuse. irrigation with saline . chromic gut suture*1 . hemostasis achieved with applying pressure. instruction given for post opv extraction    RECOMMENDATIONS:  1)Amoxicillin 500mh q8 for 7 days.   2) Dental F/[facility] dental clinic next week.  3) If any difficulty swallowing/breathing, fever occur, return to ER.     Carmella Trevino, ERICKA

## 2025-01-02 NOTE — ED PROVIDER NOTE - CLINICAL SUMMARY MEDICAL DECISION MAKING FREE TEXT BOX
VSS.  No distress.  Mild oozing from site of dental extraction.  Intra orally liver clot formation at the site of the extraction . No sign of infection or swelling.  Seen and evaluated by Dental.  *PLAN:  curettage and achieving hemostasis.  PROCEDURE:   Verbal  consent given.  Anesthesia: 1 cc of lidocaine 1:1ook epi  given  Treatment: curettage for removal of abnormal clot and any residual  tissue. irrigation with saline . chromic gut suture*1 . hemostasis achieved with applying pressure. instruction given for post opv extraction    RECOMMENDATIONS:  1)Amoxicillin 500mh q8 for 7 days.   2) Dental F/[facility] dental clinic next week.  3) If any difficulty swallowing/breathing, fever occur, return to ER.

## 2025-01-02 NOTE — ED PROVIDER NOTE - PATIENT PORTAL LINK FT
You can access the FollowMyHealth Patient Portal offered by [facility] by registering at the following website: [email or web address]. By joining [facility]’s FollowMyHealth portal, you will also be able to view your health information using other applications (apps) compatible with our system.

## 2025-01-02 NOTE — ED PROVIDER NOTE - NSFOLLOWUPINSTRUCTIONS_ED_ALL_ED_FT
Dental Extraction, Care After    Refer to this sheet in the next few weeks. These instructions provide you with information about caring for yourself after your procedure. Your health care provider may also give you more specific instructions. Your treatment has been planned according to current medical practices, but problems sometimes occur. Call your health care provider if you have any problems or questions after your procedure.    WHAT TO EXPECT AFTER THE PROCEDURE  After your procedure, it is common to have:     Pain and swelling for a few days.  Numbness for a few hours after the procedure.    HOME CARE INSTRUCTIONS  Lifestyle    Protect the area where your tooth was extracted, even if there is no pain.  Do not smoke, do not spit, and do not drink through a straw until your health care provider says it is okay.  Eat soft-textured foods as directed by your health care provider. Avoid hot drinks and spicy foods until your gum has healed.    Incision Care    Follow instructions from your health care provider about:  Incision care.  Gauze changes and removal.  Incision closure removal.  Do not chew on the gauze.  If you have heavy bleeding from your gum, fold a clean piece of gauze, place it on the bleeding gum, and bite on it as directed by your health care provider.    General Instructions    Take medicines only as directed by your health care provider.  Do not rinse your mouth until your health care provider says it is okay. Once you are told that you can rinse your mouth, do not rinse with a lot of force (vigorously). Doing that can break up the needed clot that forms where your tooth was extracted.  You may rinse your mouth with warm salt water after your health care provider says it is okay. You can make a salt rinse by mixing one teaspoon of salt in two cups of warm water. Do this as directed by your health care provider.  Do not brush or floss near the tooth socket where your tooth was extracted until your health care provider says it is okay. You may brush your other teeth.  If directed, apply ice to your cheek on the affected side of your mouth:  Put ice in a plastic bag.  Place a towel between your skin and the bag.  Leave the ice on for 20 minutes, 2–3 times a day.  Keep all follow-up visits as directed by your health care provider. This is important.    SEEK MEDICAL CARE IF:  Your pain is not controlled with medicines.  You have a fever with nausea, vomiting, or chills.  You have a severe cough or shortness of breath.    SEEK IMMEDIATE MEDICAL CARE IF:  You have uncontrolled bleeding, increased swelling, or severe pain.  You have fluid, blood, or pus coming from the gum where your tooth was extracted.  You have difficulty swallowing.  You cannot open your mouth.    ADDITIONAL NOTES AND INSTRUCTIONS    Please follow up with your Primary MD in 24-48 hr.  Seek immediate medical care for any new/worsening signs or symptoms.

## 2025-01-02 NOTE — ED PROVIDER NOTE - NSFOLLOWUPCLINICS_GEN_ALL_ED_FT
[facility]  Dental  [address] [place] [id number]  Phone: [phone]  Fax:   Follow Up Time: 4-6 Days

## 2025-01-02 NOTE — ED PROVIDER NOTE - PROGRESS NOTE DETAILS
case d/w dental resident - will see pt in ED hold txa pt seen and evaluated by dental; bleeding controlled with suture; pt to f/u with dental clinic on monday;

## 2025-01-03 DIAGNOSIS — K02.63 DENTAL CARIES ON SMOOTH SURFACE PENETRATING INTO PULP: ICD-10-CM

## 2025-01-07 ENCOUNTER — OUTPATIENT (OUTPATIENT)
Dept: OUTPATIENT SERVICES | Facility: HOSPITAL | Age: 65
LOS: 1 days | End: 2025-01-07
Payer: MEDICARE

## 2025-01-07 DIAGNOSIS — K02.9 DENTAL CARIES, UNSPECIFIED: ICD-10-CM

## 2025-01-07 DIAGNOSIS — Z95.1 PRESENCE OF AORTOCORONARY BYPASS GRAFT: Chronic | ICD-10-CM

## 2025-01-07 PROCEDURE — D0140: CPT

## 2025-01-07 PROCEDURE — D0220: CPT

## 2025-01-09 ENCOUNTER — OUTPATIENT (OUTPATIENT)
Dept: OUTPATIENT SERVICES | Facility: HOSPITAL | Age: 65
LOS: 1 days | End: 2025-01-09
Payer: MEDICARE

## 2025-01-09 DIAGNOSIS — Z95.1 PRESENCE OF AORTOCORONARY BYPASS GRAFT: Chronic | ICD-10-CM

## 2025-01-09 DIAGNOSIS — K01.1 IMPACTED TEETH: ICD-10-CM

## 2025-01-09 DIAGNOSIS — K02.9 DENTAL CARIES, UNSPECIFIED: ICD-10-CM

## 2025-01-09 PROCEDURE — D0170: CPT

## 2025-01-09 PROCEDURE — D0230: CPT

## 2025-01-09 PROCEDURE — D0330: CPT

## 2025-01-10 DIAGNOSIS — K02.9 DENTAL CARIES, UNSPECIFIED: ICD-10-CM

## 2025-01-21 ENCOUNTER — RX RENEWAL (OUTPATIENT)
Age: 65
End: 2025-01-21

## 2025-01-23 ENCOUNTER — APPOINTMENT (OUTPATIENT)
Dept: OTOLARYNGOLOGY | Facility: CLINIC | Age: 65
End: 2025-01-23
Payer: MEDICAID

## 2025-01-23 ENCOUNTER — NON-APPOINTMENT (OUTPATIENT)
Age: 65
End: 2025-01-23

## 2025-01-23 VITALS — WEIGHT: 168 LBS | HEIGHT: 68 IN | BODY MASS INDEX: 25.46 KG/M2

## 2025-01-23 DIAGNOSIS — E04.9 NONTOXIC GOITER, UNSPECIFIED: ICD-10-CM

## 2025-01-23 DIAGNOSIS — Z90.89 OTHER SPECIFIED POSTPROCEDURAL STATES: ICD-10-CM

## 2025-01-23 DIAGNOSIS — Z98.890 OTHER SPECIFIED POSTPROCEDURAL STATES: ICD-10-CM

## 2025-01-23 PROCEDURE — 99024 POSTOP FOLLOW-UP VISIT: CPT

## 2025-02-05 ENCOUNTER — OUTPATIENT (OUTPATIENT)
Dept: OUTPATIENT SERVICES | Facility: HOSPITAL | Age: 65
LOS: 1 days | End: 2025-02-05
Payer: MEDICARE

## 2025-02-05 DIAGNOSIS — K02.9 DENTAL CARIES, UNSPECIFIED: ICD-10-CM

## 2025-02-05 DIAGNOSIS — Z95.1 PRESENCE OF AORTOCORONARY BYPASS GRAFT: Chronic | ICD-10-CM

## 2025-02-05 PROCEDURE — D0170: CPT

## 2025-02-07 DIAGNOSIS — K02.9 DENTAL CARIES, UNSPECIFIED: ICD-10-CM

## 2025-02-24 ENCOUNTER — OUTPATIENT (OUTPATIENT)
Dept: OUTPATIENT SERVICES | Facility: HOSPITAL | Age: 65
LOS: 1 days | End: 2025-02-24
Payer: MEDICARE

## 2025-02-24 DIAGNOSIS — K08.409 PARTIAL LOSS OF TEETH, UNSPECIFIED CAUSE, UNSPECIFIED CLASS: ICD-10-CM

## 2025-02-24 DIAGNOSIS — Z95.1 PRESENCE OF AORTOCORONARY BYPASS GRAFT: Chronic | ICD-10-CM

## 2025-02-24 PROCEDURE — D0470: CPT

## 2025-02-26 ENCOUNTER — RX RENEWAL (OUTPATIENT)
Age: 65
End: 2025-02-26

## 2025-02-26 DIAGNOSIS — K08.409 PARTIAL LOSS OF TEETH, UNSPECIFIED CAUSE, UNSPECIFIED CLASS: ICD-10-CM

## 2025-02-28 ENCOUNTER — OUTPATIENT (OUTPATIENT)
Dept: OUTPATIENT SERVICES | Facility: HOSPITAL | Age: 65
LOS: 1 days | End: 2025-02-28
Payer: MEDICARE

## 2025-02-28 DIAGNOSIS — K01.1 IMPACTED TEETH: ICD-10-CM

## 2025-02-28 DIAGNOSIS — Z95.1 PRESENCE OF AORTOCORONARY BYPASS GRAFT: Chronic | ICD-10-CM

## 2025-02-28 PROCEDURE — D9310: CPT

## 2025-03-03 ENCOUNTER — OUTPATIENT (OUTPATIENT)
Dept: OUTPATIENT SERVICES | Facility: HOSPITAL | Age: 65
LOS: 1 days | End: 2025-03-03
Payer: MEDICARE

## 2025-03-03 ENCOUNTER — RX RENEWAL (OUTPATIENT)
Age: 65
End: 2025-03-03

## 2025-03-03 DIAGNOSIS — K08.409 PARTIAL LOSS OF TEETH, UNSPECIFIED CAUSE, UNSPECIFIED CLASS: ICD-10-CM

## 2025-03-03 DIAGNOSIS — Z95.1 PRESENCE OF AORTOCORONARY BYPASS GRAFT: Chronic | ICD-10-CM

## 2025-03-03 PROCEDURE — D9310: CPT

## 2025-03-03 RX ORDER — ASPIRIN 81 MG/1
81 TABLET, COATED ORAL
Qty: 90 | Refills: 2 | Status: ACTIVE | COMMUNITY
Start: 2025-03-03 | End: 1900-01-01

## 2025-03-04 DIAGNOSIS — K08.409 PARTIAL LOSS OF TEETH, UNSPECIFIED CAUSE, UNSPECIFIED CLASS: ICD-10-CM

## 2025-03-10 ENCOUNTER — APPOINTMENT (OUTPATIENT)
Dept: CARDIOLOGY | Facility: CLINIC | Age: 65
End: 2025-03-10
Payer: MEDICAID

## 2025-03-10 VITALS
DIASTOLIC BLOOD PRESSURE: 86 MMHG | HEIGHT: 68 IN | HEART RATE: 58 BPM | OXYGEN SATURATION: 99 % | SYSTOLIC BLOOD PRESSURE: 124 MMHG

## 2025-03-10 DIAGNOSIS — R73.03 PREDIABETES.: ICD-10-CM

## 2025-03-10 DIAGNOSIS — I10 ESSENTIAL (PRIMARY) HYPERTENSION: ICD-10-CM

## 2025-03-10 DIAGNOSIS — I25.10 ATHEROSCLEROTIC HEART DISEASE OF NATIVE CORONARY ARTERY W/OUT ANGINA PECTORIS: ICD-10-CM

## 2025-03-10 DIAGNOSIS — E78.5 HYPERLIPIDEMIA, UNSPECIFIED: ICD-10-CM

## 2025-03-10 LAB
ALBUMIN SERPL ELPH-MCNC: 4.4 G/DL
ALP BLD-CCNC: 69 U/L
ALT SERPL-CCNC: 47 U/L
ANION GAP SERPL CALC-SCNC: 12 MMOL/L
AST SERPL-CCNC: 31 U/L
BILIRUB SERPL-MCNC: 0.5 MG/DL
BUN SERPL-MCNC: 23 MG/DL
CALCIUM SERPL-MCNC: 9.5 MG/DL
CHLORIDE SERPL-SCNC: 102 MMOL/L
CHOLEST SERPL-MCNC: 155 MG/DL
CO2 SERPL-SCNC: 25 MMOL/L
CREAT SERPL-MCNC: 1.29 MG/DL
EGFRCR SERPLBLD CKD-EPI 2021: 62 ML/MIN/1.73M2
ESTIMATED AVERAGE GLUCOSE: 126 MG/DL
GLUCOSE SERPL-MCNC: 98 MG/DL
HBA1C MFR BLD HPLC: 6 %
HCT VFR BLD CALC: 47.3 %
HDLC SERPL-MCNC: 56 MG/DL
HGB BLD-MCNC: 15.1 G/DL
LDLC SERPL CALC-MCNC: 75 MG/DL
MCHC RBC-ENTMCNC: 28.2 PG
MCHC RBC-ENTMCNC: 31.9 G/DL
MCV RBC AUTO: 88.2 FL
NONHDLC SERPL-MCNC: 99 MG/DL
PLATELET # BLD AUTO: 216 K/UL
POTASSIUM SERPL-SCNC: 4.5 MMOL/L
PROT SERPL-MCNC: 6.7 G/DL
RBC # BLD: 5.36 M/UL
RBC # FLD: 13.5 %
SODIUM SERPL-SCNC: 139 MMOL/L
TRIGL SERPL-MCNC: 141 MG/DL
TSH SERPL-ACNC: 3.65 UIU/ML
WBC # FLD AUTO: 6.89 K/UL

## 2025-03-10 PROCEDURE — 99214 OFFICE O/P EST MOD 30 MIN: CPT

## 2025-03-10 RX ORDER — LEVOTHYROXINE SODIUM 0.11 MG/1
112 TABLET ORAL
Qty: 90 | Refills: 0 | Status: ACTIVE | COMMUNITY
Start: 2025-01-27

## 2025-03-10 RX ORDER — ROSUVASTATIN CALCIUM 40 MG/1
40 TABLET, FILM COATED ORAL
Qty: 90 | Refills: 1 | Status: ACTIVE | COMMUNITY
Start: 2025-03-10 | End: 1900-01-01

## 2025-03-17 ENCOUNTER — OUTPATIENT (OUTPATIENT)
Dept: OUTPATIENT SERVICES | Facility: HOSPITAL | Age: 65
LOS: 1 days | End: 2025-03-17
Payer: MEDICARE

## 2025-03-17 DIAGNOSIS — Z95.1 PRESENCE OF AORTOCORONARY BYPASS GRAFT: Chronic | ICD-10-CM

## 2025-03-17 DIAGNOSIS — K08.409 PARTIAL LOSS OF TEETH, UNSPECIFIED CAUSE, UNSPECIFIED CLASS: ICD-10-CM

## 2025-03-17 PROCEDURE — D0170: CPT

## 2025-03-31 ENCOUNTER — OUTPATIENT (OUTPATIENT)
Dept: OUTPATIENT SERVICES | Facility: HOSPITAL | Age: 65
LOS: 1 days | End: 2025-03-31
Payer: MEDICAID

## 2025-03-31 DIAGNOSIS — Z95.1 PRESENCE OF AORTOCORONARY BYPASS GRAFT: Chronic | ICD-10-CM

## 2025-03-31 DIAGNOSIS — K05.6 PERIODONTAL DISEASE, UNSPECIFIED: ICD-10-CM

## 2025-03-31 PROCEDURE — D9310: CPT

## 2025-04-01 DIAGNOSIS — K05.6 PERIODONTAL DISEASE, UNSPECIFIED: ICD-10-CM

## 2025-04-17 ENCOUNTER — OUTPATIENT (OUTPATIENT)
Dept: OUTPATIENT SERVICES | Facility: HOSPITAL | Age: 65
LOS: 1 days | End: 2025-04-17

## 2025-04-17 DIAGNOSIS — Z95.1 PRESENCE OF AORTOCORONARY BYPASS GRAFT: Chronic | ICD-10-CM

## 2025-04-17 DIAGNOSIS — K01.1 IMPACTED TEETH: ICD-10-CM

## 2025-04-17 PROCEDURE — D0170: CPT

## 2025-04-18 DIAGNOSIS — K02.9 DENTAL CARIES, UNSPECIFIED: ICD-10-CM

## 2025-05-12 ENCOUNTER — OUTPATIENT (OUTPATIENT)
Dept: OUTPATIENT SERVICES | Facility: HOSPITAL | Age: 65
LOS: 1 days | End: 2025-05-12
Payer: MEDICARE

## 2025-05-12 DIAGNOSIS — K08.409 PARTIAL LOSS OF TEETH, UNSPECIFIED CAUSE, UNSPECIFIED CLASS: ICD-10-CM

## 2025-05-12 DIAGNOSIS — Z95.1 PRESENCE OF AORTOCORONARY BYPASS GRAFT: Chronic | ICD-10-CM

## 2025-05-12 PROCEDURE — D4341: CPT

## 2025-05-13 DIAGNOSIS — K05.6 PERIODONTAL DISEASE, UNSPECIFIED: ICD-10-CM

## 2025-06-02 ENCOUNTER — OUTPATIENT (OUTPATIENT)
Dept: OUTPATIENT SERVICES | Facility: HOSPITAL | Age: 65
LOS: 1 days | End: 2025-06-02
Payer: MEDICAID

## 2025-06-02 DIAGNOSIS — K05.6 PERIODONTAL DISEASE, UNSPECIFIED: ICD-10-CM

## 2025-06-02 DIAGNOSIS — Z95.1 PRESENCE OF AORTOCORONARY BYPASS GRAFT: Chronic | ICD-10-CM

## 2025-06-02 PROCEDURE — D4341: CPT

## 2025-06-04 DIAGNOSIS — K05.6 PERIODONTAL DISEASE, UNSPECIFIED: ICD-10-CM

## 2025-06-25 ENCOUNTER — OUTPATIENT (OUTPATIENT)
Dept: OUTPATIENT SERVICES | Facility: HOSPITAL | Age: 65
LOS: 1 days | End: 2025-06-25
Payer: MEDICAID

## 2025-06-25 DIAGNOSIS — K01.1 IMPACTED TEETH: ICD-10-CM

## 2025-06-25 DIAGNOSIS — Z95.1 PRESENCE OF AORTOCORONARY BYPASS GRAFT: Chronic | ICD-10-CM

## 2025-06-25 PROCEDURE — D0150: CPT

## 2025-06-27 DIAGNOSIS — K05.6 PERIODONTAL DISEASE, UNSPECIFIED: ICD-10-CM

## 2025-07-08 LAB
ANION GAP SERPL CALC-SCNC: 10 MMOL/L
BUN SERPL-MCNC: 22 MG/DL
CALCIUM SERPL-MCNC: 8.9 MG/DL
CHLORIDE SERPL-SCNC: 107 MMOL/L
CO2 SERPL-SCNC: 24 MMOL/L
CREAT SERPL-MCNC: 1.2 MG/DL
EGFRCR SERPLBLD CKD-EPI 2021: 68 ML/MIN/1.73M2
GLUCOSE SERPL-MCNC: 97 MG/DL
POTASSIUM SERPL-SCNC: 5.1 MMOL/L
SODIUM SERPL-SCNC: 141 MMOL/L

## 2025-07-09 ENCOUNTER — APPOINTMENT (OUTPATIENT)
Dept: CARDIOLOGY | Facility: CLINIC | Age: 65
End: 2025-07-09
Payer: MEDICAID

## 2025-07-09 VITALS
HEART RATE: 57 BPM | WEIGHT: 180 LBS | DIASTOLIC BLOOD PRESSURE: 85 MMHG | BODY MASS INDEX: 27.28 KG/M2 | HEIGHT: 68 IN | SYSTOLIC BLOOD PRESSURE: 142 MMHG

## 2025-07-09 VITALS — DIASTOLIC BLOOD PRESSURE: 80 MMHG | SYSTOLIC BLOOD PRESSURE: 138 MMHG

## 2025-07-09 PROCEDURE — 99214 OFFICE O/P EST MOD 30 MIN: CPT

## 2025-08-12 ENCOUNTER — RX RENEWAL (OUTPATIENT)
Age: 65
End: 2025-08-12